# Patient Record
Sex: MALE | Race: WHITE | NOT HISPANIC OR LATINO | Employment: FULL TIME | URBAN - NONMETROPOLITAN AREA
[De-identification: names, ages, dates, MRNs, and addresses within clinical notes are randomized per-mention and may not be internally consistent; named-entity substitution may affect disease eponyms.]

---

## 2023-09-18 ENCOUNTER — APPOINTMENT (EMERGENCY)
Dept: CT IMAGING | Facility: HOSPITAL | Age: 23
DRG: 053 | End: 2023-09-18
Payer: MEDICARE

## 2023-09-18 ENCOUNTER — HOSPITAL ENCOUNTER (INPATIENT)
Facility: HOSPITAL | Age: 23
LOS: 1 days | Discharge: HOME | DRG: 053 | End: 2023-09-19
Attending: EMERGENCY MEDICINE | Admitting: HOSPITALIST
Payer: MEDICARE

## 2023-09-18 DIAGNOSIS — R56.9 SEIZURES (HCC): Primary | ICD-10-CM

## 2023-09-18 PROBLEM — E87.20 LACTIC ACIDOSIS: Status: ACTIVE | Noted: 2023-09-18

## 2023-09-18 PROBLEM — F19.90 DRUG USE: Status: ACTIVE | Noted: 2023-09-18

## 2023-09-18 PROBLEM — D72.829 LEUKOCYTOSIS: Status: ACTIVE | Noted: 2023-09-18

## 2023-09-18 PROBLEM — G40.309: Status: ACTIVE | Noted: 2023-09-18

## 2023-09-18 LAB
ALBUMIN SERPL BCP-MCNC: 5 G/DL (ref 3.5–5)
ALP SERPL-CCNC: 69 U/L (ref 34–104)
ALT SERPL W P-5'-P-CCNC: 14 U/L (ref 7–52)
ANION GAP SERPL CALCULATED.3IONS-SCNC: 26 MMOL/L
AST SERPL W P-5'-P-CCNC: 18 U/L (ref 13–39)
ATRIAL RATE: 99 BPM
BASOPHILS # BLD AUTO: 0.1 THOUSANDS/ÂΜL (ref 0–0.1)
BASOPHILS NFR BLD AUTO: 1 % (ref 0–1)
BILIRUB SERPL-MCNC: 0.41 MG/DL (ref 0.2–1)
BUN SERPL-MCNC: 12 MG/DL (ref 5–25)
CALCIUM SERPL-MCNC: 9.4 MG/DL (ref 8.4–10.2)
CHLORIDE SERPL-SCNC: 103 MMOL/L (ref 96–108)
CK SERPL-CCNC: 186 U/L (ref 39–308)
CO2 SERPL-SCNC: 12 MMOL/L (ref 21–32)
CREAT SERPL-MCNC: 0.93 MG/DL (ref 0.6–1.3)
EOSINOPHIL # BLD AUTO: 0.16 THOUSAND/ÂΜL (ref 0–0.61)
EOSINOPHIL NFR BLD AUTO: 1 % (ref 0–6)
ERYTHROCYTE [DISTWIDTH] IN BLOOD BY AUTOMATED COUNT: 12.9 % (ref 11.6–15.1)
ETHANOL SERPL-MCNC: <10 MG/DL
GFR SERPL CREATININE-BSD FRML MDRD: 116 ML/MIN/1.73SQ M
GLUCOSE SERPL-MCNC: 114 MG/DL (ref 65–140)
GLUCOSE SERPL-MCNC: 117 MG/DL (ref 65–140)
GLUCOSE SERPL-MCNC: 118 MG/DL (ref 65–140)
HCT VFR BLD AUTO: 51.8 % (ref 36.5–49.3)
HGB BLD-MCNC: 15.7 G/DL (ref 12–17)
IMM GRANULOCYTES # BLD AUTO: 0.17 THOUSAND/UL (ref 0–0.2)
IMM GRANULOCYTES NFR BLD AUTO: 1 % (ref 0–2)
LACTATE SERPL-SCNC: 20.9 MMOL/L (ref 0.5–2)
LACTATE SERPL-SCNC: 9.6 MMOL/L (ref 0.5–2)
LYMPHOCYTES # BLD AUTO: 3.85 THOUSANDS/ÂΜL (ref 0.6–4.47)
LYMPHOCYTES NFR BLD AUTO: 18 % (ref 14–44)
MCH RBC QN AUTO: 30.4 PG (ref 26.8–34.3)
MCHC RBC AUTO-ENTMCNC: 30.3 G/DL (ref 31.4–37.4)
MCV RBC AUTO: 100 FL (ref 82–98)
MONOCYTES # BLD AUTO: 2.87 THOUSAND/ÂΜL (ref 0.17–1.22)
MONOCYTES NFR BLD AUTO: 13 % (ref 4–12)
NEUTROPHILS # BLD AUTO: 14.29 THOUSANDS/ÂΜL (ref 1.85–7.62)
NEUTS SEG NFR BLD AUTO: 66 % (ref 43–75)
NRBC BLD AUTO-RTO: 0 /100 WBCS
P AXIS: 72 DEGREES
PLATELET # BLD AUTO: 294 THOUSANDS/UL (ref 149–390)
PMV BLD AUTO: 11 FL (ref 8.9–12.7)
POTASSIUM SERPL-SCNC: 3.4 MMOL/L (ref 3.5–5.3)
PR INTERVAL: 170 MS
PROT SERPL-MCNC: 7.4 G/DL (ref 6.4–8.4)
QRS AXIS: 82 DEGREES
QRSD INTERVAL: 92 MS
QT INTERVAL: 364 MS
QTC INTERVAL: 467 MS
RBC # BLD AUTO: 5.17 MILLION/UL (ref 3.88–5.62)
SODIUM SERPL-SCNC: 141 MMOL/L (ref 135–147)
T WAVE AXIS: 72 DEGREES
VENTRICULAR RATE: 99 BPM
WBC # BLD AUTO: 21.44 THOUSAND/UL (ref 4.31–10.16)

## 2023-09-18 PROCEDURE — 80053 COMPREHEN METABOLIC PANEL: CPT | Performed by: EMERGENCY MEDICINE

## 2023-09-18 PROCEDURE — 99284 EMERGENCY DEPT VISIT MOD MDM: CPT

## 2023-09-18 PROCEDURE — 99223 1ST HOSP IP/OBS HIGH 75: CPT | Performed by: HOSPITALIST

## 2023-09-18 PROCEDURE — 96374 THER/PROPH/DIAG INJ IV PUSH: CPT

## 2023-09-18 PROCEDURE — NC001 PR NO CHARGE: Performed by: NURSE PRACTITIONER

## 2023-09-18 PROCEDURE — 83605 ASSAY OF LACTIC ACID: CPT | Performed by: EMERGENCY MEDICINE

## 2023-09-18 PROCEDURE — 96361 HYDRATE IV INFUSION ADD-ON: CPT

## 2023-09-18 PROCEDURE — 93005 ELECTROCARDIOGRAM TRACING: CPT

## 2023-09-18 PROCEDURE — 82948 REAGENT STRIP/BLOOD GLUCOSE: CPT

## 2023-09-18 PROCEDURE — 70450 CT HEAD/BRAIN W/O DYE: CPT

## 2023-09-18 PROCEDURE — 36415 COLL VENOUS BLD VENIPUNCTURE: CPT | Performed by: EMERGENCY MEDICINE

## 2023-09-18 PROCEDURE — 82077 ASSAY SPEC XCP UR&BREATH IA: CPT | Performed by: EMERGENCY MEDICINE

## 2023-09-18 PROCEDURE — 85025 COMPLETE CBC W/AUTO DIFF WBC: CPT | Performed by: EMERGENCY MEDICINE

## 2023-09-18 PROCEDURE — 80177 DRUG SCRN QUAN LEVETIRACETAM: CPT | Performed by: HOSPITALIST

## 2023-09-18 PROCEDURE — 99291 CRITICAL CARE FIRST HOUR: CPT | Performed by: EMERGENCY MEDICINE

## 2023-09-18 PROCEDURE — G1004 CDSM NDSC: HCPCS

## 2023-09-18 PROCEDURE — 82550 ASSAY OF CK (CPK): CPT | Performed by: EMERGENCY MEDICINE

## 2023-09-18 RX ORDER — SODIUM CHLORIDE 9 MG/ML
75 INJECTION, SOLUTION INTRAVENOUS CONTINUOUS
Status: DISCONTINUED | OUTPATIENT
Start: 2023-09-18 | End: 2023-09-18

## 2023-09-18 RX ORDER — LORAZEPAM 2 MG/ML
1 INJECTION INTRAMUSCULAR ONCE
Status: COMPLETED | OUTPATIENT
Start: 2023-09-18 | End: 2023-09-18

## 2023-09-18 RX ORDER — ZONISAMIDE 100 MG/1
100 CAPSULE ORAL DAILY
Status: DISCONTINUED | OUTPATIENT
Start: 2023-09-18 | End: 2023-09-19

## 2023-09-18 RX ORDER — POTASSIUM CHLORIDE 14.9 MG/ML
20 INJECTION INTRAVENOUS ONCE
Status: COMPLETED | OUTPATIENT
Start: 2023-09-18 | End: 2023-09-18

## 2023-09-18 RX ORDER — SODIUM CHLORIDE, SODIUM GLUCONATE, SODIUM ACETATE, POTASSIUM CHLORIDE, MAGNESIUM CHLORIDE, SODIUM PHOSPHATE, DIBASIC, AND POTASSIUM PHOSPHATE .53; .5; .37; .037; .03; .012; .00082 G/100ML; G/100ML; G/100ML; G/100ML; G/100ML; G/100ML; G/100ML
1000 INJECTION, SOLUTION INTRAVENOUS ONCE
Status: COMPLETED | OUTPATIENT
Start: 2023-09-18 | End: 2023-09-18

## 2023-09-18 RX ORDER — NICOTINE 21 MG/24HR
1 PATCH, TRANSDERMAL 24 HOURS TRANSDERMAL DAILY
Status: DISCONTINUED | OUTPATIENT
Start: 2023-09-19 | End: 2023-09-19 | Stop reason: HOSPADM

## 2023-09-18 RX ORDER — ZONISAMIDE 100 MG/1
100 CAPSULE ORAL DAILY
Status: DISCONTINUED | OUTPATIENT
Start: 2023-09-19 | End: 2023-09-18

## 2023-09-18 RX ORDER — ZONISAMIDE 100 MG/1
100 CAPSULE ORAL DAILY
COMMUNITY
End: 2023-09-19

## 2023-09-18 RX ORDER — SODIUM CHLORIDE, SODIUM GLUCONATE, SODIUM ACETATE, POTASSIUM CHLORIDE, MAGNESIUM CHLORIDE, SODIUM PHOSPHATE, DIBASIC, AND POTASSIUM PHOSPHATE .53; .5; .37; .037; .03; .012; .00082 G/100ML; G/100ML; G/100ML; G/100ML; G/100ML; G/100ML; G/100ML
100 INJECTION, SOLUTION INTRAVENOUS CONTINUOUS
Status: DISCONTINUED | OUTPATIENT
Start: 2023-09-18 | End: 2023-09-19

## 2023-09-18 RX ORDER — ACETAMINOPHEN 325 MG/1
975 TABLET ORAL ONCE
Status: COMPLETED | OUTPATIENT
Start: 2023-09-18 | End: 2023-09-18

## 2023-09-18 RX ORDER — LEVETIRACETAM 500 MG/1
500 TABLET ORAL 2 TIMES DAILY
COMMUNITY
Start: 2022-09-23 | End: 2023-09-19

## 2023-09-18 RX ORDER — ACETAMINOPHEN 325 MG/1
650 TABLET ORAL EVERY 4 HOURS PRN
Status: DISCONTINUED | OUTPATIENT
Start: 2023-09-18 | End: 2023-09-19 | Stop reason: HOSPADM

## 2023-09-18 RX ORDER — LORAZEPAM 2 MG/ML
2 INJECTION INTRAMUSCULAR EVERY 6 HOURS PRN
Status: DISCONTINUED | OUTPATIENT
Start: 2023-09-18 | End: 2023-09-19 | Stop reason: HOSPADM

## 2023-09-18 RX ORDER — LORAZEPAM 2 MG/ML
INJECTION INTRAMUSCULAR
Status: COMPLETED
Start: 2023-09-18 | End: 2023-09-18

## 2023-09-18 RX ORDER — LORAZEPAM 2 MG/ML
2 INJECTION INTRAMUSCULAR ONCE
Status: DISCONTINUED | OUTPATIENT
Start: 2023-09-18 | End: 2023-09-18

## 2023-09-18 RX ADMIN — SODIUM CHLORIDE, SODIUM GLUCONATE, SODIUM ACETATE, POTASSIUM CHLORIDE AND MAGNESIUM CHLORIDE 100 ML/HR: 526; 502; 368; 37; 30 INJECTION, SOLUTION INTRAVENOUS at 18:18

## 2023-09-18 RX ADMIN — LORAZEPAM 1 MG: 2 INJECTION INTRAMUSCULAR at 12:37

## 2023-09-18 RX ADMIN — LEVETIRACETAM 1000 MG: 100 INJECTION, SOLUTION INTRAVENOUS at 22:21

## 2023-09-18 RX ADMIN — SODIUM CHLORIDE 1000 ML: 0.9 INJECTION, SOLUTION INTRAVENOUS at 14:53

## 2023-09-18 RX ADMIN — LORAZEPAM 1 MG: 2 INJECTION INTRAMUSCULAR; INTRAVENOUS at 12:37

## 2023-09-18 RX ADMIN — ZONISAMIDE 100 MG: 100 CAPSULE ORAL at 19:04

## 2023-09-18 RX ADMIN — SODIUM CHLORIDE, SODIUM GLUCONATE, SODIUM ACETATE, POTASSIUM CHLORIDE AND MAGNESIUM CHLORIDE 1000 ML: 526; 502; 368; 37; 30 INJECTION, SOLUTION INTRAVENOUS at 18:10

## 2023-09-18 RX ADMIN — POTASSIUM CHLORIDE 20 MEQ: 14.9 INJECTION, SOLUTION INTRAVENOUS at 18:09

## 2023-09-18 RX ADMIN — SODIUM CHLORIDE 1000 ML: 0.9 INJECTION, SOLUTION INTRAVENOUS at 12:55

## 2023-09-18 RX ADMIN — ACETAMINOPHEN 975 MG: 325 TABLET, FILM COATED ORAL at 14:49

## 2023-09-18 RX ADMIN — SODIUM CHLORIDE 75 ML/HR: 0.9 INJECTION, SOLUTION INTRAVENOUS at 16:57

## 2023-09-18 NOTE — CODE DOCUMENTATION
Patient brought to room from ED, patient having seizure like activity, rapid response called, suctioned, vitals, was placed on non-rebreather, ativan was given. Patient transferred to ICU.

## 2023-09-18 NOTE — ASSESSMENT & PLAN NOTE
Mother reports he has used meth in the past. Multiple urine drug screens positive for cannabis and amphetamines at [facility]. Check urine drug screen.  Mother reports he is on parol so she believes he has not used drugs recently  - check UDS

## 2023-09-18 NOTE — PROGRESS NOTES
Pastoral Care Progress Note    2023  Patient: Sarika Alexandra. : 2000  Admission Date & Time: 2023 1236  MRN: [id number] [facility]: [id number]      Ch responded at 1520 to . Pt occupied with providers. Mother, Courtney Carvalho, bedside. [address] [place] [address] provided support to Niyah in the family waiting room outside of ICU. Niyah shared regarding pt's emotional/social history:    Pt's younger sister killed herself a year ago, he has 2 other sisters. Pt has been living in [facility], and was recently incarcerated. Mother encouraged pt to come to live with her in the area so that he might have a fresh start. Mother of pt attests that pt is non compliant with his medication. Mother of pt characterized her household as low income. Niyah shared that Leesa Cox recently started to work where she works, this is his first full time job. Niyah shared her hope that pt might be able to acclimate to working, enjoy his job, but has concerns that his seizures may obstruct his ability to work. Niyah characterizes the pt as "loving but a teaser" to his sisters. She shared that the pt and spent some time apart in more recent years because they "butted heads". Niyah did not speak about additional support available to the pt, although she mentioned that the pt's biological father is part of his life. Niyah is new to the area and has a daughter that recently moved here. Niyah shared that her currently anxious and sad because of today's medical event, she is hoping it doesn't "set Leesa Cox back" as far as gainful employment and building a life. CH will follow. Chaplaincy Interventions Utilized:   Empowerment: Encouraged self-care and Provided grief counseling    Exploration: Explored relational needs & resources    Relationship Building: Listened empathically      Chaplaincy Outcomes Achieved:   Identified priorities      Spiritual Coping Strategies Utilized:   Connectedness     23 1600   Clinical Encounter Type   Visited With Family  (mother)   Crisis Visit Code  (rrt)

## 2023-09-18 NOTE — RAPID RESPONSE
Rapid Response Note  Nickie Argueta. 26 y.o. male MRN: [id number]  Unit/Bed#: -01 Encounter: [id number]    Rapid Response Notification(s):   Response called date/time:  9/18/2023 3:20 PM  Response team arrival date/time:  9/18/2023 3:21 PM  Response end date/time:  9/18/2023 3:30 PM  Level of care:  Medsur  [facility]  Primary reason for rapid response call:  New onset of seizures    Rapid Response Intervention(s):   Airway:  Suction  Breathing:  Oxygen  Fluids administered:  None  Medications administered:  None       Assessment:   · Seizure with Hx of such and hasn't been on a home medication for 2 weeks    Plan:   · Seizure broke without medications during rapid. Hold on additional ativan at this time  · Per mom: pt on Keppra 500 BID and zonegran at home, he hasn't had his zonegran in 2 weeks due to pharmacy doesn't have it. · Pt now appears post ictal  · Will given Keppra 1 gram now  · Up grade to SD2  · Continue seizure precautions and routine neuro checks  · Consider neurology consult  · Resume home medications when able to take PO     Rapid Response Outcome:   Transfer:  Transfer to stepdown 2  Primary service notified of transfer: Yes    Code Status: Level 1 (Full Code)      Family notified of transfer: yes  Family member contacted: mother. At bedside     Background/Situation:   Nickie Angeles is a 26 y.o. male who was a rapid response when arriving to [address] [facility] from ED due to active seizure in progress. Pt with Hx of Seizures and hasn't been on one of his home medications. Just admitted in the ED for seizures.      Review of Systems   Unable to perform ROS: Mental status change       Objective:   Vitals:    09/18/23 1238 09/18/23 1245 09/18/23 1450 09/18/23 1500   BP: 160/86 142/65 123/74 112/67   BP Location: Right arm Right arm Right arm Right arm   Pulse: 103 92 84 73   Resp: 22 22 16 18   Temp: 98.4 °F (36.9 °C)      TempSrc: Temporal      SpO2: 98% 97% 98% 96% Weight: 91.8 kg (202 lb 6.1 oz)        Physical Exam  Vitals and nursing note reviewed. Constitutional:       Appearance: He is ill-appearing. HENT:      Head: Normocephalic and atraumatic. Mouth/Throat:      Mouth: Mucous membranes are moist.   Eyes:      Conjunctiva/sclera: Conjunctivae normal.   Cardiovascular:      Rate and Rhythm: Normal rate and regular rhythm. Heart sounds: Normal heart sounds. Pulmonary:      Effort: Pulmonary effort is normal.   Abdominal:      General: Bowel sounds are normal.      Palpations: Abdomen is soft. Musculoskeletal:         General: Normal range of motion. Cervical back: Neck supple. Skin:     General: Skin is warm and dry. Neurological:      Comments: Initially with tonic clonic seizure. Pt broke without medication. Post seizure: withdrawing in all extremities to physical stimuli, not responding to simple questions or following commands. Groaning. Portions of the record may have been created with voice recognition software. Occasional wrong word or "sound a like" substitutions may have occurred due to the inherent limitations of voice recognition software. Read the chart carefully and recognize, using context, where substitutions have occurred.     SOLITARIO Toth

## 2023-09-18 NOTE — CODE DOCUMENTATION
Rapid response  asked for Ativan but then patient became post-ictal and request was cancelled. Patient transferred to ICU.

## 2023-09-18 NOTE — ED PROVIDER NOTES
History  Chief Complaint   Patient presents with   • Seizure - Prior Hx Of     3 seizures today. Seizing on arrival        29-year-old male accompanied by mother describes 2 seizures prior to arrival and again in emergency department. Mother notes seizures last couple minutes and then postictal approximately 15-20 minutes. Recently unable to fill prescription for his Zonegran. Mother notes recently relocated to [facility], was released from long-term. No history of injury or viral prodrome. Last prior seizure may have been few weeks ago. Mother provides partially filled bottle of keppra 500 mg tabs, notes he is compliant      History provided by:  Parent  History limited by:  Acuity of condition  Seizure - Prior Hx Of  Seizure activity on arrival: yes    Seizure type:  Grand mal  Episode characteristics: abnormal movements, combativeness, generalized shaking and stiffening    Postictal symptoms: confusion and somnolence    Return to baseline: yes    Timing:  Clustered  Progression:  Worsening  Context: medical non-compliance and stress    Context: not previous head injury    PTA treatment:  None  History of seizures: yes        Prior to Admission Medications   Prescriptions Last Dose Informant Patient Reported? Taking?   levETIRAcetam (KEPPRA) 500 mg tablet 9/18/2023  Yes Yes   Sig: Take 500 mg by mouth 2 (two) times a day   zonisamide (ZONEGRAN) 100 mg capsule Unknown Mother Yes No   Sig: Take 100 mg by mouth daily Per family pt hasnt had since [facility] of [facility] approx 2-3 weeks      Facility-Administered Medications: None       Past Medical History:   Diagnosis Date   • Seizure ([address])        History reviewed. No pertinent surgical history. History reviewed. No pertinent family history. I have reviewed and agree with the history as documented.     E-Cigarette/Vaping     E-Cigarette/Vaping Substances     Social History     Tobacco Use   • Smoking status: Every Day     Packs/day: 1.00     Years: 5.00     Total pack years: 5.00     Types: Cigarettes   • Smokeless tobacco: Never   Substance Use Topics   • Alcohol use: Yes     Comment: sociall   • Drug use: Not Currently     Comment: per mother pt has history of meth amphetamine abuse       Review of Systems   All other systems reviewed and are negative. Physical Exam  Physical Exam  Vitals and nursing note reviewed. Constitutional:       General: He is in acute distress. Comments: Appears postictal, combative, agitated, requiring manual restraint   HENT:      Head: Normocephalic and atraumatic. Mouth/Throat:      Mouth: Mucous membranes are moist.      Pharynx: Oropharynx is clear. Comments: Drooling  Eyes:      Conjunctiva/sclera: Conjunctivae normal.      Pupils: Pupils are equal, round, and reactive to light. Cardiovascular:      Rate and Rhythm: Normal rate and regular rhythm. Pulses: Normal pulses. Heart sounds: Normal heart sounds. Pulmonary:      Effort: Pulmonary effort is normal.      Breath sounds: Normal breath sounds. Abdominal:      General: Bowel sounds are normal. There is no distension. Palpations: Abdomen is soft. Tenderness: There is no abdominal tenderness. Musculoskeletal:         General: No deformity. Cervical back: Neck supple. No rigidity. Skin:     General: Skin is warm and dry. Neurological:      General: No focal deficit present. Mental Status: He is alert. Sensory: No sensory deficit. Motor: No weakness. Comments: Moving all extremities, mumbled speech, agitated, calms without noxious stimulation, sonorous breathing   Psychiatric:         Behavior: Behavior normal.         Thought Content:  Thought content normal.         Judgment: Judgment normal.         Vital Signs  ED Triage Vitals   Temperature Pulse Respirations Blood Pressure SpO2   09/18/23 1238 09/18/23 1238 09/18/23 1238 09/18/23 1238 09/18/23 1238   98.4 °F (36.9 °C) 103 22 160/86 98 %      Temp Source Heart Rate Source Patient Position - Orthostatic VS BP Location FiO2 (%)   09/18/23 1238 09/18/23 1238 09/18/23 1245 09/18/23 1238 --   Temporal Monitor Lying Right arm       Pain Score       09/18/23 1449       4           Vitals:    09/19/23 0230 09/19/23 0400 09/19/23 0729 09/19/23 1220   BP: 120/62  116/65 122/73   Pulse: 73 70 68 (!) 53   Patient Position - Orthostatic VS: Lying  Lying Lying         Visual Acuity  Visual Acuity    Flowsheet Row Most Recent Value   L Pupil Size (mm) 3   R Pupil Size (mm) 3   L Pupil Shape Round   R Pupil Shape Round          ED Medications  Medications   nicotine (NICODERM CQ) 14 mg/24hr TD 24 hr patch 1 patch (1 patch Transdermal Not Given 9/19/23 0843)   LORazepam (ATIVAN) injection 2 mg (has no administration in time range)   acetaminophen (TYLENOL) tablet 650 mg (has no administration in time range)   nicotine polacrilex (NICORETTE) gum 2 mg (2 mg Oral Given 9/19/23 0843)   levETIRAcetam (KEPPRA) tablet 500 mg (has no administration in time range)   zonisamide (ZONEGRAN) capsule 300 mg (has no administration in time range)   sodium chloride 0.9 % bolus 1,000 mL (0 mL Intravenous Stopped 9/18/23 1447)   LORazepam (ATIVAN) injection 1 mg (1 mg Intravenous Given 9/18/23 1237)   sodium chloride 0.9 % bolus 1,000 mL (0 mL Intravenous Stopped 9/18/23 1553)   acetaminophen (TYLENOL) tablet 975 mg (975 mg Oral Given 9/18/23 1449)   multi-electrolyte (ISOLYTE-S PH 7.4) bolus 1,000 mL (0 mL Intravenous Stopped 9/18/23 1910)   potassium chloride 20 mEq IVPB (premix) (0 mEq Intravenous Stopped 9/18/23 2009)   zonisamide (ZONEGRAN) capsule 200 mg (200 mg Oral Given 9/19/23 1217)       Diagnostic Studies  Results Reviewed     Procedure Component Value Units Date/Time    Rapid drug screen, urine [[id number]]  (Abnormal) Collected: 09/19/23 0513    Lab Status: Final result Specimen: Urine, Clean Catch Updated: 09/19/23 0552     Amph/Meth UR Negative     Barbiturate Ur Negative     Benzodiazepine Urine Negative     Cocaine Urine Negative     Methadone Urine Negative     Opiate Urine Negative     PCP Ur Negative     THC Urine Positive     Oxycodone Urine Negative    Narrative:      Presumptive report. If requested, specimen will be sent to reference lab for confirmation. FOR MEDICAL PURPOSES ONLY. IF CONFIRMATION NEEDED PLEASE CONTACT THE LAB WITHIN 5 DAYS. Drug Screen Cutoff Levels:  AMPHETAMINE/METHAMPHETAMINES  1000 ng/mL  BARBITURATES     200 ng/mL  BENZODIAZEPINES     200 ng/mL  COCAINE      300 ng/mL  METHADONE      300 ng/mL  OPIATES      300 ng/mL  PHENCYCLIDINE     25 ng/mL  THC       50 ng/mL  OXYCODONE      100 ng/mL    UA (URINE) with reflex to Scope [[id number]] Collected: 09/19/23 0513    Lab Status: Final result Specimen: Urine, Clean Catch Updated: 09/19/23 0530     Color, UA Yellow     Clarity, UA Clear     Specific Gravity, UA 1.010     pH, UA 7.0     Leukocytes, UA Negative     Nitrite, UA Negative     Protein, UA Negative mg/dl      Glucose, UA Negative mg/dl      Ketones, UA Negative mg/dl      Urobilinogen, UA 0.2 E.U./dl      Bilirubin, UA Negative     Occult Blood, UA Negative    Lactic acid 2 Hours [[id number]]  (Abnormal) Collected: 09/18/23 1552    Lab Status: Final result Specimen: Blood from Arm, Right Updated: 09/18/23 1630     LACTIC ACID 9.6 mmol/L     Narrative:      Result may be elevated if tourniquet was used during collection. Lactic acid, plasma (w/reflex if result > 2.0) [[id number]]  (Abnormal) Collected: 09/18/23 1254    Lab Status: Final result Specimen: Blood from Arm, Left Updated: 09/18/23 1343     LACTIC ACID 20.9 mmol/L     Narrative:      Result may be elevated if tourniquet was used during collection.     Comprehensive metabolic panel [[id number]]  (Abnormal) Collected: 09/18/23 1254    Lab Status: Final result Specimen: Blood from Arm, Left Updated: 09/18/23 1323     Sodium 141 mmol/L      Potassium 3.4 mmol/L      Chloride 103 mmol/L      CO2 12 mmol/L ANION GAP 26 mmol/L      BUN 12 mg/dL      Creatinine 0.93 mg/dL      Glucose 118 mg/dL      Calcium 9.4 mg/dL      AST 18 U/L      ALT 14 U/L      Alkaline Phosphatase 69 U/L      Total Protein 7.4 g/dL      Albumin 5.0 g/dL      Total Bilirubin 0.41 mg/dL      eGFR 116 ml/min/1.73sq m     Narrative:      [facility] guidelines for Chronic Kidney Disease (CKD):   •  Stage 1 with normal or high GFR (GFR > 90 mL/min/1.73 square meters)  •  Stage 2 Mild CKD (GFR = 60-89 mL/min/1.73 square meters)  •  Stage 3A Moderate CKD (GFR = 45-59 mL/min/1.73 square meters)  •  Stage 3B Moderate CKD (GFR = 30-44 mL/min/1.73 square meters)  •  Stage 4 Severe CKD (GFR = 15-29 mL/min/1.73 square meters)  •  Stage 5 End Stage CKD (GFR <15 mL/min/1.73 square meters)  Note: GFR calculation is accurate only with a steady state creatinine    CK [[id number]]  (Normal) Collected: 09/18/23 1254    Lab Status: Final result Specimen: Blood from Arm, Left Updated: 09/18/23 1323     Total  U/L     Ethanol [[id number]]  (Normal) Collected: 09/18/23 1254    Lab Status: Final result Specimen: Blood from Arm, Left Updated: 09/18/23 1323     Ethanol Lvl <10 mg/dL     CBC and differential [[id number]]  (Abnormal) Collected: 09/18/23 1254    Lab Status: Final result Specimen: Blood from Arm, Left Updated: 09/18/23 1309     WBC 21.44 Thousand/uL      RBC 5.17 Million/uL      Hemoglobin 15.7 g/dL      Hematocrit 51.8 %       fL      MCH 30.4 pg      MCHC 30.3 g/dL      RDW 12.9 %      MPV 11.0 fL      Platelets 908 Thousands/uL      nRBC 0 /100 WBCs      Neutrophils Relative 66 %      Immat GRANS % 1 %      Lymphocytes Relative 18 %      Monocytes Relative 13 %      Eosinophils Relative 1 %      Basophils Relative 1 %      Neutrophils Absolute 14.29 Thousands/µL      Immature Grans Absolute 0.17 Thousand/uL      Lymphocytes Absolute 3.85 Thousands/µL      Monocytes Absolute 2.87 Thousand/µL      Eosinophils Absolute 0.16 Thousand/µL      Basophils Absolute 0.10 Thousands/µL     Fingerstick Glucose (POCT) [[id number]]  (Normal) Collected: 09/18/23 1238    Lab Status: Final result Updated: 09/18/23 1244     POC Glucose 117 mg/dl                  CT head without contrast    (Results Pending)              Procedures  CriticalCare Time    Date/Time: 9/19/2023 1:58 PM    Performed by: Julio Pablo DO  Authorized by: Julio Pablo DO    Critical care provider statement:     Critical care time (minutes):  30    Critical care was necessary to treat or prevent imminent or life-threatening deterioration of the following conditions:  CNS failure or compromise    Critical care was time spent personally by me on the following activities:  Blood draw for specimens, obtaining history from patient or surrogate, development of treatment plan with patient or surrogate, discussions with consultants, evaluation of patient's response to treatment, examination of patient, ordering and performing treatments and interventions, ordering and review of laboratory studies, ordering and review of radiographic studies and re-evaluation of patient's condition             ED Course  ED Course as of 09/19/23 1359   Mon Sep 18, 2023   1321 EKG 12:39 PM normal sinus rhythm rate 99 normal axis normal intervals T wave inversion in aVL no ST elevation or depression interpreted by me   1345 MEDICAL ALCOHOL: <10   1346 WBC(!): 21.44   1346 LACTIC ACID(!!): 20.9   1346 Potassium(!): 3.4   1346 Notes moderate general headache. Lucid and appropriate. Not compliant with Keppra 500 mg twice daily, but not taking Zonegran 100 mg 3 times daily for the past week and a half   1357 Neurology Elan TT   1404 Neuro Elan agreeable with GSL observation   1409 CT head without contrast  Reviewed                                             Medical Decision Making  Seizures [facility]): acute illness or injury  Amount and/or Complexity of Data Reviewed  Labs: ordered. Decision-making details documented in ED Course. Radiology: ordered. Decision-making details documented in ED Course. ECG/medicine tests: ordered and independent interpretation performed. Decision-making details documented in ED Course. Risk  OTC drugs. Prescription drug management. Decision regarding hospitalization. Disposition  Final diagnoses:   Seizures ([address])     Time reflects when diagnosis was documented in both MDM as applicable and the Disposition within this note     Time User Action Codes Description Comment    9/18/2023  2:06 PM Rosemary Flores Add [R56.9] Seizures [facility])       ED Disposition     ED Disposition   Admit    Condition   Stable    Date/Time   Mon Sep 18, 2023  2:45 PM    Comment   Case was discussed with Dr. Kay and the patient's admission status was agreed to be Admission Status: inpatient status to the service of Dr. Kay           Follow-up Information    None         Current Discharge Medication List      CONTINUE these medications which have NOT CHANGED    Details   levETIRAcetam (KEPPRA) 500 mg tablet Take 500 mg by mouth 2 (two) times a day      zonisamide (ZONEGRAN) 100 mg capsule Take 100 mg by mouth daily Per family pt hasnt had since out of long term approx 2-3 weeks             No discharge procedures on file.     PDMP Review     None          ED Provider  Electronically Signed by           Rosemary Flores DO  09/19/23 9447

## 2023-09-18 NOTE — H&P
[address] [facility]  H&P  Name: Adair Lima. 26 y.o. male I MRN: [id number]  Unit/Bed#: -01 I Date of Admission: 9/18/2023   Date of Service: 9/18/2023 I Hospital Day: 0      Assessment/Plan   Epileptic seizure, generalized [facility])  Assessment & Plan  Patient has history epileptic seizures, prior reports non-compliance with medications. He has been unable to obtain his zonegran due to not being available at his new pharmacy past 2 weeks per his mother. Review of prior labs at [facility], [place] show multiple keppra levels <2.0 and one zonegran level below normal limits supporting non-compliance. He presents with 2x seizures prior to ED, 1x in ED, and another on arrival to the floor. - CT Head pending resuls  - Keppra 1,000 mg IV BID  - restart Zonegran 100mg daily in morning - unclear what his dose was but was initially started with a 100mg dose to be titrated up to 300mg daily  - suspect non-compliance versus drug use as etiology of seizures  - see below for drug use  - check Keppra level  - Neuro consult  - seizure precautions  - NPO as currently post-ictal  - Ativan 2mg IV PRN q6h seizure    Drug use  Assessment & Plan  Mother reports he has used meth in the past. Multiple urine drug screens positive for cannabis and amphetamines at [facility], [place]. Check urine drug screen. Mother reports he is on parol so she believes he has not used drugs recently  - check UDS    Leukocytosis  Assessment & Plan  Suspect due to seizure  - CBC in morning  - UA pending    Lactic acidosis  Assessment & Plan  Likely due to seizure  - trend in morning  - IVF           VTE Pharmacologic Prophylaxis: VTE Score: 1 Low Risk (Score 0-2) - Encourage Ambulation. Code Status: Level 1 - Full Code   Discussion with family: Updated  (mother) at bedside.     Anticipated Length of Stay: Patient will be admitted on an inpatient basis with an anticipated length of stay of greater than 2 midnights secondary to seizures. Total Time Spent on Date of Encounter in care of patient: 52 mins. This time was spent on one or more of the following: performing physical exam; counseling and coordination of care; obtaining or reviewing history; documenting in the medical record; reviewing/ordering tests, medications or procedures; communicating with other healthcare professionals and discussing with patient's family/caregivers. Chief Complaint: seizures    History of Present Illness:  James Langford is a 26 y.o. male with a PMH of epileptic seizures, noncompliance, cannabis and amphetamine use who presents with seizures. Patient is brought to the hospital after having 2 seizures at home, he had a seizure witnessed in the emergency department which resolved with 1 mg Ativan. He was found to have significantly elevated lactic acidosis as well as leukocytosis. Recommended for admission for further monitoring of seizures. He has a history of noncompliance and usually follows at [facility] where he has had low Keppra levels, and positive drug screens in the past.  Patient had an additional seizure upon being brought up to the floor, rapid response was called and patient had resolution of seizure without need for Ativan, placed in stepdown for closer monitoring. Patient at this time is postictal and cannot provide much information. His mother details that the patient just recently moved in with her, after getting out of [facility] a few weeks ago. They were unable to obtain his Zonegran as the pharmacy did not have it in stock and he has not taken it for the past 2 weeks. She otherwise reports that she believes he takes his Keppra as prescribed. To the best of her knowledge he has not recently used recreational drugs, but she knows the past he has used marijuana and methamphetamines.      Review of Systems:  Review of Systems   Unable to perform ROS: Mental status change   POST-ICTAL    Past Medical and Surgical History: Past Medical History:   Diagnosis Date   • Seizure [facility])        History reviewed. No pertinent surgical history. Meds/Allergies:  Prior to Admission medications    Medication Sig Start Date End Date Taking? Authorizing Provider   levETIRAcetam (KEPPRA) 500 mg tablet Take 500 mg by mouth 2 (two) times a day 9/23/22 9/23/23 Yes Historical Provider, MD GIPSON have reviewed home medications with patient family member. Allergies: No Known Allergies    Social History:  Marital Status: Single   Occupation: N/A  Patient Pre-hospital Living Situation: Home  Patient Pre-hospital Level of Mobility: walks  Patient Pre-hospital Diet Restrictions: none  Substance Use History:   Social History     Substance and Sexual Activity   Alcohol Use Yes    Comment: sociall     Social History     Tobacco Use   Smoking Status Every Day   • Types: Cigarettes   Smokeless Tobacco Never     Social History     Substance and Sexual Activity   Drug Use Not Currently       Family History:  History reviewed. No pertinent family history. Physical Exam:     Vitals:   Blood Pressure: 124/71 (09/18/23 1600)  Pulse: 82 (09/18/23 1600)  Temperature: 98 °F (36.7 °C) (09/18/23 1548)  Temp Source: Temporal (09/18/23 1548)  Respirations: 12 (09/18/23 1600)  Height: 6' (182.9 cm) (09/18/23 1600)  Weight - Scale: 88.3 kg (194 lb 10.7 oz) (09/18/23 1548)  SpO2: 96 % (09/18/23 1600)    Physical Exam  Vitals and nursing note reviewed. Constitutional:       Appearance: He is well-developed. He is toxic-appearing. HENT:      Head: Normocephalic and atraumatic. Eyes:      Conjunctiva/sclera: Conjunctivae normal.   Cardiovascular:      Rate and Rhythm: Normal rate and regular rhythm. Heart sounds: No murmur heard. Pulmonary:      Effort: Pulmonary effort is normal. No respiratory distress. Breath sounds: Normal breath sounds. Abdominal:      Palpations: Abdomen is soft. Tenderness: There is no abdominal tenderness.    Musculoskeletal: General: No swelling. Cervical back: Neck supple. Skin:     General: Skin is warm and dry. Capillary Refill: Capillary refill takes less than 2 seconds. Neurological:      Comments: Patient is postictal, he wakes to verbal cue but then goes back to sleep, he is moving all his extremities spontaneously at this time. Additional Data:     Lab Results:  Results from last 7 days   Lab Units 09/18/23  1254   WBC Thousand/uL 21.44*   HEMOGLOBIN g/dL 15.7   HEMATOCRIT % 51.8*   PLATELETS Thousands/uL 294   NEUTROS PCT % 66   LYMPHS PCT % 18   MONOS PCT % 13*   EOS PCT % 1     Results from last 7 days   Lab Units 09/18/23  1254   SODIUM mmol/L 141   POTASSIUM mmol/L 3.4*   CHLORIDE mmol/L 103   CO2 mmol/L 12*   BUN mg/dL 12   CREATININE mg/dL 0.93   ANION GAP mmol/L 26   CALCIUM mg/dL 9.4   ALBUMIN g/dL 5.0   TOTAL BILIRUBIN mg/dL 0.41   ALK PHOS U/L 69   ALT U/L 14   AST U/L 18   GLUCOSE RANDOM mg/dL 118         Results from last 7 days   Lab Units 09/18/23  1525 09/18/23  1238   POC GLUCOSE mg/dl 114 117         Results from last 7 days   Lab Units 09/18/23  1552 09/18/23  1254   LACTIC ACID mmol/L 9.6* 20.9*       Lines/Drains:  Invasive Devices     Peripheral Intravenous Line  Duration           Peripheral IV 09/18/23 Distal;Left;Upper;Ventral (anterior) Arm <1 day    Peripheral IV 09/18/23 Right;Ventral (anterior) Forearm <1 day                    Imaging: Personally reviewed the following imaging: CT head - no obvious or significant finding awaiting radiology read  CT head without contrast    (Results Pending)       ** Please Note: This note has been constructed using a voice recognition system.  **

## 2023-09-18 NOTE — ASSESSMENT & PLAN NOTE
Patient has history epileptic seizures, prior reports non-compliance with medications. He has been unable to obtain his zonegran due to not being available at his new pharmacy past 2 weeks per his mother. Review of prior labs at [facility] show multiple keppra levels <2.0 and one zonegran level below normal limits supporting non-compliance. He presents with 2x seizures prior to ED, 1x in ED, and another on arrival to the floor.   - CT Head pending resuls  - Keppra 1,000 mg IV BID  - restart Zonegran 100mg daily in morning - unclear what his dose was but was initially started with a 100mg dose to be titrated up to 300mg daily  - suspect non-compliance versus drug use as etiology of seizures  - see below for drug use  - check Keppra level  - Neuro consult  - seizure precautions  - NPO as currently post-ictal  - Ativan 2mg IV PRN q6h seizure

## 2023-09-18 NOTE — PLAN OF CARE
Problem: Potential for Falls  Goal: Patient will remain free of falls  Description: INTERVENTIONS:  - Educate patient/family on patient safety including physical limitations  - Instruct patient to call for assistance with activity   - Consult OT/PT to assist with strengthening/mobility   - Keep Call bell within reach  - Keep bed low and locked with side rails adjusted as appropriate  - Keep care items and personal belongings within reach  - Initiate and maintain comfort rounds  - Make Fall Risk Sign visible to staff  - Offer Toileting every 2 Hours, in advance of need  - Initiate/Maintain bed alarm  - Obtain necessary fall risk management equipment: non skid socks  - Apply yellow socks and bracelet for high fall risk patients  - Consider moving patient to room near nurses station  Outcome: Progressing     Problem: Prexisting or High Potential for Compromised Skin Integrity  Goal: Skin integrity is maintained or improved  Description: INTERVENTIONS:  - Identify patients at risk for skin breakdown  - Assess and monitor skin integrity  - Assess and monitor nutrition and hydration status  - Monitor labs   - Assess for incontinence   - Turn and reposition patient  - Assist with mobility/ambulation  - Relieve pressure over bony prominences  - Avoid friction and shearing  - Provide appropriate hygiene as needed including keeping skin clean and dry  - Evaluate need for skin moisturizer/barrier cream  - Collaborate with interdisciplinary team   - Patient/family teaching  - Consider wound care consult   Outcome: Progressing     Problem: MOBILITY - ADULT  Goal: Maintain or return to baseline ADL function  Description: INTERVENTIONS:  -  Assess patient's ability to carry out ADLs; assess patient's baseline for ADL function and identify physical deficits which impact ability to perform ADLs (bathing, care of mouth/teeth, toileting, grooming, dressing, etc.)  - Assess/evaluate cause of self-care deficits   - Assess range of motion  - Assess patient's mobility; develop plan if impaired  - Assess patient's need for assistive devices and provide as appropriate  - Encourage maximum independence but intervene and supervise when necessary  - Involve family in performance of ADLs  - Assess for home care needs following discharge   - Consider OT consult to assist with ADL evaluation and planning for discharge  - Provide patient education as appropriate  Outcome: Progressing  Goal: Maintains/Returns to pre admission functional level  Description: INTERVENTIONS:  - Perform BMAT or MOVE assessment daily.   - Set and communicate daily mobility goal to care team and patient/family/caregiver. - Collaborate with rehabilitation services on mobility goals if consulted  - Perform Range of Motion 2 times a day. - Reposition patient every 2 hours.   - Dangle patient 2 times a day  - Stand patient 2 times a day  - Ambulate patient 2 times a day  - Out of bed to chair 2 times a day   - Out of bed for meals 2 times a day  - Out of bed for toileting  - Record patient progress and toleration of activity level   Outcome: Progressing     Problem: PAIN - ADULT  Goal: Verbalizes/displays adequate comfort level or baseline comfort level  Description: Interventions:  - Encourage patient to monitor pain and request assistance  - Assess pain using appropriate pain scale  - Administer analgesics based on type and severity of pain and evaluate response  - Implement non-pharmacological measures as appropriate and evaluate response  - Consider cultural and social influences on pain and pain management  - Notify physician/advanced practitioner if interventions unsuccessful or patient reports new pain  Outcome: Progressing     Problem: INFECTION - ADULT  Goal: Absence or prevention of progression during hospitalization  Description: INTERVENTIONS:  - Assess and monitor for signs and symptoms of infection  - Monitor lab/diagnostic results  - Monitor all insertion sites, i.e. indwelling lines, tubes, and drains  - Monitor endotracheal if appropriate and nasal secretions for changes in amount and color  - [place] appropriate cooling/warming therapies per order  - Administer medications as ordered  - Instruct and encourage patient and family to use good hand hygiene technique  - Identify and instruct in appropriate isolation precautions for identified infection/condition  Outcome: Progressing  Goal: Absence of fever/infection during neutropenic period  Description: INTERVENTIONS:  - Monitor WBC    Outcome: Progressing     Problem: SAFETY ADULT  Goal: Patient will remain free of falls  Description: INTERVENTIONS:  - Educate patient/family on patient safety including physical limitations  - Instruct patient to call for assistance with activity   - Consult OT/PT to assist with strengthening/mobility   - Keep Call bell within reach  - Keep bed low and locked with side rails adjusted as appropriate  - Keep care items and personal belongings within reach  - Initiate and maintain comfort rounds  - Make Fall Risk Sign visible to staff  - Offer Toileting every 2 Hours, in advance of need  - Initiate/Maintain bed alarm  - Obtain necessary fall risk management equipment: non skid socks  - Apply yellow socks and bracelet for high fall risk patients  - Consider moving patient to room near nurses station  Outcome: Progressing  Goal: Maintain or return to baseline ADL function  Description: INTERVENTIONS:  -  Assess patient's ability to carry out ADLs; assess patient's baseline for ADL function and identify physical deficits which impact ability to perform ADLs (bathing, care of mouth/teeth, toileting, grooming, dressing, etc.)  - Assess/evaluate cause of self-care deficits   - Assess range of motion  - Assess patient's mobility; develop plan if impaired  - Assess patient's need for assistive devices and provide as appropriate  - Encourage maximum independence but intervene and supervise when necessary  - Involve family in performance of ADLs  - Assess for home care needs following discharge   - Consider OT consult to assist with ADL evaluation and planning for discharge  - Provide patient education as appropriate  Outcome: Progressing  Goal: Maintains/Returns to pre admission functional level  Description: INTERVENTIONS:  - Perform BMAT or MOVE assessment daily.   - Set and communicate daily mobility goal to care team and patient/family/caregiver. - Collaborate with rehabilitation services on mobility goals if consulted  - Perform Range of Motion 2 times a day. - Reposition patient every 2 hours. - Dangle patient 2 times a day  - Stand patient 2 times a day  - Ambulate patient 2 times a day  - Out of bed to chair 2 times a day   - Out of bed for meals 2 times a day  - Out of bed for toileting  - Record patient progress and toleration of activity level   Outcome: Progressing     Problem: DISCHARGE PLANNING  Goal: Discharge to home or other facility with appropriate resources  Description: INTERVENTIONS:  - Identify barriers to discharge w/patient and caregiver  - Arrange for needed discharge resources and transportation as appropriate  - Identify discharge learning needs (meds, wound care, etc.)  - Arrange for interpretive services to assist at discharge as needed  - Refer to Case Management Department for coordinating discharge planning if the patient needs post-hospital services based on physician/advanced practitioner order or complex needs related to functional status, cognitive ability, or social support system  Outcome: Progressing     Problem: Knowledge Deficit  Goal: Patient/family/caregiver demonstrates understanding of disease process, treatment plan, medications, and discharge instructions  Description: Complete learning assessment and assess knowledge base.   Interventions:  - Provide teaching at level of understanding  - Provide teaching via preferred learning methods  Outcome: Progressing     Problem: RESPIRATORY - ADULT  Goal: Achieves optimal ventilation and oxygenation  Description: INTERVENTIONS:  - Assess for changes in respiratory status  - Assess for changes in mentation and behavior  - Position to facilitate oxygenation and minimize respiratory effort  - Oxygen administered by appropriate delivery if ordered  - Initiate smoking cessation education as indicated  - Encourage broncho-pulmonary hygiene including cough, deep breathe, Incentive Spirometry  - Assess the need for suctioning and aspirate as needed  - Assess and instruct to report SOB or any respiratory difficulty  - Respiratory Therapy support as indicated  Outcome: Progressing     Problem: Knowledge Deficit  Goal: Patient/family/caregiver demonstrates understanding of disease process, treatment plan, medications, and discharge instructions  Description: Complete learning assessment and assess knowledge base.   Interventions:  - Provide teaching at level of understanding  - Provide teaching via preferred learning methods  Outcome: Progressing

## 2023-09-19 VITALS
HEIGHT: 72 IN | DIASTOLIC BLOOD PRESSURE: 64 MMHG | SYSTOLIC BLOOD PRESSURE: 127 MMHG | HEART RATE: 70 BPM | TEMPERATURE: 98.8 F | RESPIRATION RATE: 18 BRPM | WEIGHT: 194.67 LBS | BODY MASS INDEX: 26.37 KG/M2 | OXYGEN SATURATION: 98 %

## 2023-09-19 LAB
AMPHETAMINES SERPL QL SCN: NEGATIVE
ANION GAP SERPL CALCULATED.3IONS-SCNC: 5 MMOL/L
BARBITURATES UR QL: NEGATIVE
BENZODIAZ UR QL: NEGATIVE
BILIRUB UR QL STRIP: NEGATIVE
BUN SERPL-MCNC: 9 MG/DL (ref 5–25)
CALCIUM SERPL-MCNC: 8.6 MG/DL (ref 8.4–10.2)
CHLORIDE SERPL-SCNC: 111 MMOL/L (ref 96–108)
CLARITY UR: CLEAR
CO2 SERPL-SCNC: 23 MMOL/L (ref 21–32)
COCAINE UR QL: NEGATIVE
COLOR UR: YELLOW
CREAT SERPL-MCNC: 0.93 MG/DL (ref 0.6–1.3)
ERYTHROCYTE [DISTWIDTH] IN BLOOD BY AUTOMATED COUNT: 13.2 % (ref 11.6–15.1)
GFR SERPL CREATININE-BSD FRML MDRD: 116 ML/MIN/1.73SQ M
GLUCOSE SERPL-MCNC: 96 MG/DL (ref 65–140)
GLUCOSE UR STRIP-MCNC: NEGATIVE MG/DL
HCT VFR BLD AUTO: 42.2 % (ref 36.5–49.3)
HGB BLD-MCNC: 13.5 G/DL (ref 12–17)
HGB UR QL STRIP.AUTO: NEGATIVE
KETONES UR STRIP-MCNC: NEGATIVE MG/DL
LACTATE SERPL-SCNC: 0.7 MMOL/L (ref 0.5–2)
LEUKOCYTE ESTERASE UR QL STRIP: NEGATIVE
MAGNESIUM SERPL-MCNC: 2.3 MG/DL (ref 1.9–2.7)
MCH RBC QN AUTO: 29.8 PG (ref 26.8–34.3)
MCHC RBC AUTO-ENTMCNC: 32 G/DL (ref 31.4–37.4)
MCV RBC AUTO: 93 FL (ref 82–98)
METHADONE UR QL: NEGATIVE
NITRITE UR QL STRIP: NEGATIVE
OPIATES UR QL SCN: NEGATIVE
OXYCODONE+OXYMORPHONE UR QL SCN: NEGATIVE
PCP UR QL: NEGATIVE
PH UR STRIP.AUTO: 7 [PH]
PHOSPHATE SERPL-MCNC: 2.5 MG/DL (ref 2.7–4.5)
PLATELET # BLD AUTO: 220 THOUSANDS/UL (ref 149–390)
PMV BLD AUTO: 10.8 FL (ref 8.9–12.7)
POTASSIUM SERPL-SCNC: 3.8 MMOL/L (ref 3.5–5.3)
PROT UR STRIP-MCNC: NEGATIVE MG/DL
RBC # BLD AUTO: 4.53 MILLION/UL (ref 3.88–5.62)
SODIUM SERPL-SCNC: 139 MMOL/L (ref 135–147)
SP GR UR STRIP.AUTO: 1.01 (ref 1–1.03)
THC UR QL: POSITIVE
UROBILINOGEN UR QL STRIP.AUTO: 0.2 E.U./DL
WBC # BLD AUTO: 12.3 THOUSAND/UL (ref 4.31–10.16)

## 2023-09-19 PROCEDURE — 83735 ASSAY OF MAGNESIUM: CPT | Performed by: NURSE PRACTITIONER

## 2023-09-19 PROCEDURE — 80048 BASIC METABOLIC PNL TOTAL CA: CPT | Performed by: NURSE PRACTITIONER

## 2023-09-19 PROCEDURE — 81003 URINALYSIS AUTO W/O SCOPE: CPT | Performed by: HOSPITALIST

## 2023-09-19 PROCEDURE — NC001 PR NO CHARGE: Performed by: FAMILY MEDICINE

## 2023-09-19 PROCEDURE — 83605 ASSAY OF LACTIC ACID: CPT | Performed by: HOSPITALIST

## 2023-09-19 PROCEDURE — 84100 ASSAY OF PHOSPHORUS: CPT | Performed by: NURSE PRACTITIONER

## 2023-09-19 PROCEDURE — 85027 COMPLETE CBC AUTOMATED: CPT | Performed by: HOSPITALIST

## 2023-09-19 PROCEDURE — 80307 DRUG TEST PRSMV CHEM ANLYZR: CPT | Performed by: HOSPITALIST

## 2023-09-19 PROCEDURE — 99239 HOSP IP/OBS DSCHRG MGMT >30: CPT | Performed by: FAMILY MEDICINE

## 2023-09-19 RX ORDER — ZONISAMIDE 100 MG/1
200 CAPSULE ORAL ONCE
Status: COMPLETED | OUTPATIENT
Start: 2023-09-19 | End: 2023-09-19

## 2023-09-19 RX ORDER — ZONISAMIDE 100 MG/1
300 CAPSULE ORAL DAILY
Status: DISCONTINUED | OUTPATIENT
Start: 2023-09-20 | End: 2023-09-19 | Stop reason: HOSPADM

## 2023-09-19 RX ORDER — ZONISAMIDE 100 MG/1
300 CAPSULE ORAL DAILY
Qty: 90 CAPSULE | Refills: 0 | Status: SHIPPED | OUTPATIENT
Start: 2023-09-20 | End: 2023-10-20

## 2023-09-19 RX ORDER — LEVETIRACETAM 500 MG/1
500 TABLET ORAL EVERY 12 HOURS SCHEDULED
Qty: 60 TABLET | Refills: 0 | Status: SHIPPED | OUTPATIENT
Start: 2023-09-19

## 2023-09-19 RX ORDER — LEVETIRACETAM 500 MG/1
500 TABLET ORAL EVERY 12 HOURS SCHEDULED
Status: DISCONTINUED | OUTPATIENT
Start: 2023-09-19 | End: 2023-09-19 | Stop reason: HOSPADM

## 2023-09-19 RX ADMIN — ZONISAMIDE 100 MG: 100 CAPSULE ORAL at 08:42

## 2023-09-19 RX ADMIN — ZONISAMIDE 200 MG: 100 CAPSULE ORAL at 12:17

## 2023-09-19 RX ADMIN — LEVETIRACETAM 1000 MG: 100 INJECTION, SOLUTION INTRAVENOUS at 08:38

## 2023-09-19 RX ADMIN — NICOTINE POLACRILEX 2 MG: 2 GUM, CHEWING BUCCAL at 08:43

## 2023-09-19 NOTE — PROGRESS NOTES
[address] [facility]  Progress Note  Name: Adair London I  MRN: [id number]  Unit/Bed#: -01 I Date of Admission: 9/18/2023   Date of Service: 9/19/2023 I Hospital Day: 1    Assessment/Plan   * Epileptic seizure, generalized [facility])  Assessment & Plan  · Patient has history epileptic seizures, prior reports non-compliance with medications. He has been unable to obtain his zonegran due to not being available at his new pharmacy past 2 weeks per his mother. Review of prior labs at [facility] show multiple keppra levels <2.0 and one zonegran level below normal limits supporting non-compliance. He presents with 2x seizures prior to ED, 1x in ED, and another on arrival to the floor. · - CT Head pending resuls- reviewed not abnormal   · Home doses of Keppra 500 mg p.o. twice daily he states he has been compliant with previous levels were subtherapeutic. He is on Zonegran 300 mg in the morning but has not taking it 2 to 3 weeks because his pharmacy did not have it-   · Suspect breakthrough secondary to marijuana use which lowers seizure threshold and noncompliance with medication. Loaded with Keppra and started on Keppra 1 g IV twice daily discontinue place him back on Keppra 500 mg p.o. twice daily as his home dose and restart his home dose of Zonegran  · No evidence of infection  · Wbc reactive and now coming down   · Neuro consult has been ordered  · Downgrade to ms  · aax3 start diet and dc fluids       Drug use  Assessment & Plan  Mother reports he has used meth in the past. Multiple urine drug screens positive for cannabis and amphetamines at [facility]. Check urine drug screen.  Mother reports he is on parol so she believes he has not used drugs recently  - check UDS- positive for marijuana     Leukocytosis  Assessment & Plan  Suspect due to seizure  ua neg   Trended down   Reactive due to seizure    Lactic acidosis  Assessment & Plan  2/2 seizure  Resolved  Dc fluids  No evidence of infection               VTE Pharmacologic Prophylaxis: VTE Score: 1 Low Risk (Score 0-2) - Encourage Ambulation. Patient Centered Rounds: I performed bedside rounds with nursing staff today. Discussions with Specialists or Other Care Team Provider: will discuss with neuro     Education and Discussions with Family / Patient:pt will update mother   Total Time Spent on Date of Encounter in care of patient: >35 mins. This time was spent on one or more of the following: performing physical exam; counseling and coordination of care; obtaining or reviewing history; documenting in the medical record; reviewing/ordering tests, medications or procedures; communicating with other healthcare professionals and discussing with patient's family/caregivers. Current Length of Stay: 1 day(s)  Current Patient Status: Inpatient   Certification Statement: The patient will continue to require additional inpatient hospital stay due to observe if seizure recurrence  Discharge Plan: Anticipate discharge tomorrow to home. Code Status: Level 1 - Full Code    Subjective:   Seen and examined noc omplaints    Objective:     Vitals:   Temp (24hrs), Av.3 °F (36.8 °C), Min:98 °F (36.7 °C), Max:98.9 °F (37.2 °C)    Temp:  [98 °F (36.7 °C)-98.9 °F (37.2 °C)] 98.9 °F (37.2 °C)  HR:  [] 68  Resp:  [12-24] 14  BP: (112-160)/(62-86) 116/65  SpO2:  [96 %-100 %] 98 %  Body mass index is 26.4 kg/m². Input and Output Summary (last 24 hours): Intake/Output Summary (Last 24 hours) at 2023 1147  Last data filed at 2023 0501  Gross per 24 hour   Intake 3472.92 ml   Output 1800 ml   Net 1672.92 ml       Physical Exam:   Physical Exam  Vitals and nursing note reviewed. Constitutional:       General: He is not in acute distress. Appearance: He is well-developed. HENT:      Head: Normocephalic and atraumatic.    Eyes:      Conjunctiva/sclera: Conjunctivae normal.   Cardiovascular:      Rate and Rhythm: Normal rate and regular rhythm. Heart sounds: No murmur heard. Pulmonary:      Effort: Pulmonary effort is normal. No respiratory distress. Breath sounds: Normal breath sounds. No wheezing or rales. Abdominal:      General: Bowel sounds are normal. There is no distension. Palpations: Abdomen is soft. Tenderness: There is no abdominal tenderness. Musculoskeletal:         General: No swelling. Cervical back: Neck supple. Skin:     General: Skin is warm and dry. Capillary Refill: Capillary refill takes less than 2 seconds. Neurological:      Mental Status: He is alert and oriented to person, place, and time.    Psychiatric:         Mood and Affect: Mood normal.          Additional Data:     Labs:  Results from last 7 days   Lab Units 09/19/23  0513 09/18/23  1254   WBC Thousand/uL 12.30* 21.44*   HEMOGLOBIN g/dL 13.5 15.7   HEMATOCRIT % 42.2 51.8*   PLATELETS Thousands/uL 220 294   NEUTROS PCT %  --  66   LYMPHS PCT %  --  18   MONOS PCT %  --  13*   EOS PCT %  --  1     Results from last 7 days   Lab Units 09/19/23  0513 09/18/23  1254   SODIUM mmol/L 139 141   POTASSIUM mmol/L 3.8 3.4*   CHLORIDE mmol/L 111* 103   CO2 mmol/L 23 12*   BUN mg/dL 9 12   CREATININE mg/dL 0.93 0.93   ANION GAP mmol/L 5 26   CALCIUM mg/dL 8.6 9.4   ALBUMIN g/dL  --  5.0   TOTAL BILIRUBIN mg/dL  --  0.41   ALK PHOS U/L  --  69   ALT U/L  --  14   AST U/L  --  18   GLUCOSE RANDOM mg/dL 96 118         Results from last 7 days   Lab Units 09/18/23  1525 09/18/23  1238   POC GLUCOSE mg/dl 114 117         Results from last 7 days   Lab Units 09/19/23  0513 09/18/23  1552 09/18/23  1254   LACTIC ACID mmol/L 0.7 9.6* 20.9*       Lines/Drains:  Invasive Devices     Peripheral Intravenous Line  Duration           Peripheral IV 09/18/23 Distal;Left;Upper;Ventral (anterior) Arm <1 day                      Imaging: Reviewed radiology reports from this admission including: CT head    Recent Cultures (last 7 days): Last 24 Hours Medication List:   Current Facility-Administered Medications   Medication Dose Route Frequency Provider Last Rate   • acetaminophen  650 mg Oral Q4H PRN SOLITARIO Martínez     • levETIRAcetam  500 mg Oral Q12H [address] Kimberly Williamson MD     • LORazepam  2 mg Intravenous Q6H PRN Adam Connell Counts, DO     • nicotine  1 patch Transdermal Daily Adam Connell Counts, DO     • nicotine polacrilex  2 mg Oral Q2H PRN Mary Lou Dash PA-C     • zonisamide  200 mg Oral Once Kimberly Williamson MD     • [START ON 9/20/2023] zonisamide  300 mg Oral Daily Kimberly Williamson MD          Today, Patient Was Seen By: Kimberly Williamson MD    **Please Note: This note may have been constructed using a voice recognition system. **

## 2023-09-19 NOTE — ASSESSMENT & PLAN NOTE
Mother reports he has used meth in the past. Multiple urine drug screens positive for cannabis and amphetamines at [email or web address]. Check urine drug screen.  Mother reports he is on parol so she believes he has not used drugs recently  - check UDS- positive for marijuana

## 2023-09-19 NOTE — ASSESSMENT & PLAN NOTE
· Patient has history epileptic seizures, prior reports non-compliance with medications. He has been unable to obtain his zonegran due to not being available at his new pharmacy past 2 weeks per his mother. Review of prior labs at [facility] show multiple keppra levels <2.0 and one zonegran level below normal limits supporting non-compliance. He presents with 2x seizures prior to ED, 1x in ED, and another on arrival to the floor. · - CT Head pending resuls- reviewed not abnormal   · Home doses of Keppra 500 mg p.o. twice daily he states he has been compliant with previous levels were subtherapeutic. He is on Zonegran 300 mg in the morning but has not taking it 2 to 3 weeks because his pharmacy did not have it-   · Suspect breakthrough secondary to marijuana use which lowers seizure threshold and noncompliance with medication.   Loaded with Keppra and started on Keppra 1 g IV twice daily discontinue place him back on Keppra 500 mg p.o. twice daily as his home dose and restart his home dose of Zonegran  · No evidence of infection  · Wbc reactive and now coming down   · Neuro consult has been ordered  · Downgrade to ms  · aax3 start diet and dc fluids

## 2023-09-19 NOTE — ASSESSMENT & PLAN NOTE
Mother reports he has used meth in the past. Multiple urine drug screens positive for cannabis and amphetamines at [facility]. Check urine drug screen.  Mother reports he is on parol so she believes he has not used drugs recently  - check UDS- positive for marijuana

## 2023-09-19 NOTE — ASSESSMENT & PLAN NOTE
· Patient has history epileptic seizures, prior reports non-compliance with medications. He has been unable to obtain his zonegran due to not being available at his new pharmacy past 2 weeks per his mother. Review of prior labs at [facility] show multiple keppra levels <2.0 and one zonegran level below normal limits supporting non-compliance. He presents with 2x seizures prior to ED, 1x in ED, and another on arrival to the floor. · - CT Head pending resuls- reviewed not abnormal   · Home doses of Keppra 500 mg p.o. twice daily he states he has been compliant with previous levels were subtherapeutic. He is on Zonegran 300 mg in the morning but has not taking it 2 to 3 weeks because his pharmacy did not have it-   · Suspect breakthrough secondary to marijuana use which lowers seizure threshold and noncompliance with medication.   Loaded with Keppra and started on Keppra 1 g IV twice daily discontinue place him back on Keppra 500 mg p.o. twice daily as his home dose and restart his home dose of Zonegran  · No evidence of infection  · Wbc reactive and now coming down   · Neuro consult has been ordered they have not seen will place ambulatory referral for looks like John has tried to call him to set up a neuro appointment  · Downgrade to ms  · aax3 start diet and dc fluids   Has been doing well he is asking to go home I see no contraindication will discharge I called [facility] they have in stock both of his medications

## 2023-09-19 NOTE — DISCHARGE SUMMARY
[address] [facility]  Discharge- Jones Ros. 2000, 26 y.o. male MRN: [id number]  Unit/Bed#: -01 Encounter: [id number]  Primary Care Provider: No primary care provider on file. Date and time admitted to hospital: 9/18/2023 12:36 PM    * Epileptic seizure, generalized [facility])  Assessment & Plan  · Patient has history epileptic seizures, prior reports non-compliance with medications. He has been unable to obtain his zonegran due to not being available at his new pharmacy past 2 weeks per his mother. Review of prior labs at [facility] show multiple keppra levels <2.0 and one zonegran level below normal limits supporting non-compliance. He presents with 2x seizures prior to ED, 1x in ED, and another on arrival to the floor. · - CT Head pending resuls- reviewed not abnormal   · Home doses of Keppra 500 mg p.o. twice daily he states he has been compliant with previous levels were subtherapeutic. He is on Zonegran 300 mg in the morning but has not taking it 2 to 3 weeks because his pharmacy did not have it-   · Suspect breakthrough secondary to marijuana use which lowers seizure threshold and noncompliance with medication. Loaded with Keppra and started on Keppra 1 g IV twice daily discontinue place him back on Keppra 500 mg p.o. twice daily as his home dose and restart his home dose of Zonegran  · No evidence of infection  · Wbc reactive and now coming down   · Neuro consult has been ordered they have not seen will place ambulatory referral for looks like John has tried to call him to set up a neuro appointment  · Downgrade to ms  · aax3 start diet and dc fluids   Has been doing well he is asking to go home I see no contraindication will discharge I called [facility] they have in stock both of his medications      Drug use  Assessment & Plan  Mother reports he has used meth in the past. Multiple urine drug screens positive for cannabis and amphetamines at [facility].  Check urine drug screen. Mother reports he is on parol so she believes he has not used drugs recently  - check UDS- positive for marijuana     Leukocytosis  Assessment & Plan  Suspect due to seizure  ua neg   Trended down   Reactive due to seizure    Lactic acidosis  Assessment & Plan  2/2 seizure  Resolved  Dc fluids  No evidence of infection        Medical Problems     Resolved Problems  Date Reviewed: 9/19/2023   None       Discharging Physician / Practitioner: Zoe Rodríguez MD  PCP: No primary care provider on file. Admission Date:   Admission Orders (From admission, onward)     Ordered        09/18/23 [address]  Once                      Discharge Date: 09/19/23    Consultations During Hospital Stay:  · none    Procedures Performed:   · none    Significant Findings / Test Results:   · CT brain reviewed by me no acute abnormality and final is pending    Incidental Findings:   · none    Test Results Pending at Discharge (will require follow up):   · Ct brain final read     Outpatient Tests Requested:  · none    Complications:  none    Reason for Admission: Breakthrough seizure    Hospital Course:   Liliam Desai is a 26 y.o. male patient who originally presented to the hospital on 9/18/2023 due to breakthrough seizures with history of noncompliance seems he has not been taking his Zonegran for 2 to 3 weeks as the pharmacy did not have them he is out of care home 2 to 3 weeks he states he has been taking his Keppra for previous hospitalization showed low Keppra level. He was loaded with Keppra I did switch him back to his doses of home dose of 300 of Zonegran and 500 twice daily of [facility] neurology was consulted but they never seen patient - ambulatory referral placed . He has been doing well without recurrence of seizure and will be discharged home on his home meds- verified with pharmacy they do have them in stock .   No evidence of infection         Please see above list of diagnoses and related plan for additional information. Condition at Discharge: stable    Discharge Day Visit / Exam:   * Please refer to separate progress note for these details *    Discussion with Family: Updated  (friend) via phone. Discharge instructions/Information to patient and family:   See after visit summary for information provided to patient and family. Provisions for Follow-Up Care:  See after visit summary for information related to follow-up care and any pertinent home health orders. Disposition:   Home    Planned Readmission: no     Discharge Statement:  I spent >35 minutes discharging the patient. This time was spent on the day of discharge. I had direct contact with the patient on the day of discharge. Greater than 50% of the total time was spent examining patient, answering all patient questions, arranging and discussing plan of care with patient as well as directly providing post-discharge instructions. Additional time then spent on discharge activities. Discharge Medications:  See after visit summary for reconciled discharge medications provided to patient and/or family.       **Please Note: This note may have been constructed using a voice recognition system**

## 2023-09-21 LAB — LEVETIRACETAM SERPL-MCNC: 16.4 UG/ML (ref 10–40)

## 2023-09-27 NOTE — UTILIZATION REVIEW
NOTIFICATION OF INPATIENT ADMISSION   AUTHORIZATION REQUEST   SERVICING FACILITY:   [address]  Tax ID: [id number]  NPI: [id number] ATTENDING PROVIDER:  Attending Name and NPI#: Juan Carlos Granados Md [[id number]]  Address: [address] [place] [address]  Phone: [phone]   ADMISSION INFORMATION:  Place of Service: Inpatient [address]  Place of Service Code: 21  Inpatient Admission Date/Time: 9/18/23  2:45 PM  Discharge Date/Time: 9/19/2023  5:11 PM  Admitting Diagnosis Code/Description:  Seizure ([address]) [R56.9]  Seizures ([address]) [R56.9]     UTILIZATION REVIEW CONTACT:  Patrice Boateng Utilization   Network Utilization Review Department  Phone: [phone]  Fax [phone]  Email: [email or web address]  Contact for approvals/pending authorizations, clinical reviews, and discharge. PHYSICIAN ADVISORY SERVICES:  Medical Necessity Denial & [facility]sg[facility] Review  Phone: [phone]  Fax: [phone]  Email: [email or web address]

## 2023-11-28 ENCOUNTER — HOSPITAL ENCOUNTER (EMERGENCY)
Facility: HOSPITAL | Age: 23
Discharge: HOME | End: 2023-11-28
Attending: EMERGENCY MEDICINE
Payer: MEDICARE

## 2023-11-28 VITALS
BODY MASS INDEX: 26.43 KG/M2 | OXYGEN SATURATION: 98 % | RESPIRATION RATE: 21 BRPM | DIASTOLIC BLOOD PRESSURE: 87 MMHG | SYSTOLIC BLOOD PRESSURE: 144 MMHG | HEART RATE: 71 BPM | WEIGHT: 194.89 LBS | TEMPERATURE: 97.5 F

## 2023-11-28 DIAGNOSIS — R56.9 SEIZURE (HCC): Primary | ICD-10-CM

## 2023-11-28 LAB
ALBUMIN SERPL BCP-MCNC: 4.3 G/DL (ref 3.5–5)
ALP SERPL-CCNC: 68 U/L (ref 34–104)
ALT SERPL W P-5'-P-CCNC: 13 U/L (ref 7–52)
ANION GAP SERPL CALCULATED.3IONS-SCNC: 10 MMOL/L
AST SERPL W P-5'-P-CCNC: 18 U/L (ref 13–39)
BASOPHILS # BLD AUTO: 0.04 THOUSANDS/ÂΜL (ref 0–0.1)
BASOPHILS NFR BLD AUTO: 0 % (ref 0–1)
BILIRUB SERPL-MCNC: 0.3 MG/DL (ref 0.2–1)
BUN SERPL-MCNC: 13 MG/DL (ref 5–25)
CALCIUM SERPL-MCNC: 8.9 MG/DL (ref 8.4–10.2)
CHLORIDE SERPL-SCNC: 109 MMOL/L (ref 96–108)
CO2 SERPL-SCNC: 20 MMOL/L (ref 21–32)
CREAT SERPL-MCNC: 0.83 MG/DL (ref 0.6–1.3)
EOSINOPHIL # BLD AUTO: 0.09 THOUSAND/ÂΜL (ref 0–0.61)
EOSINOPHIL NFR BLD AUTO: 1 % (ref 0–6)
ERYTHROCYTE [DISTWIDTH] IN BLOOD BY AUTOMATED COUNT: 11.9 % (ref 11.6–15.1)
GFR SERPL CREATININE-BSD FRML MDRD: 125 ML/MIN/1.73SQ M
GLUCOSE SERPL-MCNC: 113 MG/DL (ref 65–140)
HCT VFR BLD AUTO: 45.1 % (ref 36.5–49.3)
HGB BLD-MCNC: 14.7 G/DL (ref 12–17)
IMM GRANULOCYTES # BLD AUTO: 0.06 THOUSAND/UL (ref 0–0.2)
IMM GRANULOCYTES NFR BLD AUTO: 1 % (ref 0–2)
LYMPHOCYTES # BLD AUTO: 2.1 THOUSANDS/ÂΜL (ref 0.6–4.47)
LYMPHOCYTES NFR BLD AUTO: 18 % (ref 14–44)
MCH RBC QN AUTO: 30 PG (ref 26.8–34.3)
MCHC RBC AUTO-ENTMCNC: 32.6 G/DL (ref 31.4–37.4)
MCV RBC AUTO: 92 FL (ref 82–98)
MONOCYTES # BLD AUTO: 1.02 THOUSAND/ÂΜL (ref 0.17–1.22)
MONOCYTES NFR BLD AUTO: 9 % (ref 4–12)
NEUTROPHILS # BLD AUTO: 8.65 THOUSANDS/ÂΜL (ref 1.85–7.62)
NEUTS SEG NFR BLD AUTO: 71 % (ref 43–75)
NRBC BLD AUTO-RTO: 0 /100 WBCS
PLATELET # BLD AUTO: 265 THOUSANDS/UL (ref 149–390)
PMV BLD AUTO: 11.6 FL (ref 8.9–12.7)
POTASSIUM SERPL-SCNC: 4 MMOL/L (ref 3.5–5.3)
PROT SERPL-MCNC: 6.4 G/DL (ref 6.4–8.4)
RBC # BLD AUTO: 4.9 MILLION/UL (ref 3.88–5.62)
SODIUM SERPL-SCNC: 139 MMOL/L (ref 135–147)
WBC # BLD AUTO: 11.96 THOUSAND/UL (ref 4.31–10.16)

## 2023-11-28 PROCEDURE — 99285 EMERGENCY DEPT VISIT HI MDM: CPT | Performed by: EMERGENCY MEDICINE

## 2023-11-28 PROCEDURE — 96365 THER/PROPH/DIAG IV INF INIT: CPT

## 2023-11-28 PROCEDURE — 93005 ELECTROCARDIOGRAM TRACING: CPT

## 2023-11-28 PROCEDURE — 36415 COLL VENOUS BLD VENIPUNCTURE: CPT | Performed by: EMERGENCY MEDICINE

## 2023-11-28 PROCEDURE — 96361 HYDRATE IV INFUSION ADD-ON: CPT

## 2023-11-28 PROCEDURE — 99284 EMERGENCY DEPT VISIT MOD MDM: CPT

## 2023-11-28 PROCEDURE — 80053 COMPREHEN METABOLIC PANEL: CPT | Performed by: EMERGENCY MEDICINE

## 2023-11-28 PROCEDURE — 85025 COMPLETE CBC W/AUTO DIFF WBC: CPT | Performed by: EMERGENCY MEDICINE

## 2023-11-28 PROCEDURE — 96375 TX/PRO/DX INJ NEW DRUG ADDON: CPT

## 2023-11-28 RX ORDER — LORAZEPAM 2 MG/ML
1 INJECTION INTRAMUSCULAR ONCE
Status: COMPLETED | OUTPATIENT
Start: 2023-11-28 | End: 2023-11-28

## 2023-11-28 RX ORDER — ZONISAMIDE 100 MG/1
100 CAPSULE ORAL DAILY
Status: DISCONTINUED | OUTPATIENT
Start: 2023-11-28 | End: 2023-11-28 | Stop reason: HOSPADM

## 2023-11-28 RX ADMIN — LORAZEPAM 1 MG: 2 INJECTION INTRAMUSCULAR; INTRAVENOUS at 11:40

## 2023-11-28 RX ADMIN — ZONISAMIDE 100 MG: 100 CAPSULE ORAL at 11:40

## 2023-11-28 RX ADMIN — LEVETIRACETAM 1000 MG: 100 INJECTION, SOLUTION INTRAVENOUS at 11:46

## 2023-11-28 RX ADMIN — SODIUM CHLORIDE 1000 ML: 0.9 INJECTION, SOLUTION INTRAVENOUS at 11:39

## 2023-11-28 NOTE — ED NOTES
This RN being approached by patient's family in [facility] at this time, outside of patient's room, this RN asked if a note can be provided for court tomorrow as patient is scheduled to appear in court. This RN information family that patient has not been set to disposition yet & if patient is discharged from facility, he can appear in court scheduled for tomorrow.      Shazia Nelson [place]  11/28/23 [id number]

## 2023-11-28 NOTE — DISCHARGE INSTRUCTIONS
Please contact your neurologist as soon as possible to arrange follow-up within the next few days  Continue seizure medicines as prescribed  Return immediately if worse or any new symptoms

## 2023-11-29 LAB
ATRIAL RATE: 83 BPM
P AXIS: 71 DEGREES
PR INTERVAL: 158 MS
QRS AXIS: 73 DEGREES
QRSD INTERVAL: 84 MS
QT INTERVAL: 382 MS
QTC INTERVAL: 448 MS
T WAVE AXIS: 63 DEGREES
VENTRICULAR RATE: 83 BPM

## 2023-12-01 NOTE — ED PROVIDER NOTES
History  Chief Complaint   Patient presents with    Seizure - Prior Hx Of     C/o 2 seizures PTA; prior hx of same. Aox4 upon arrival; ambulated to room without assistance     77-year-old male via EMS from sisters were reported to have 2 brief seizure episodes, briefly postictal and currently fatigued. Notes generally achy, mild headache. States he is compliant with his neuroleptics. States he currently uses cannabis and denies recent use of methamphetamine. Notes he is under the care of a neurologist      History provided by:  Patient  Seizure - Prior Hx Of  Seizure activity on arrival: no    Seizure type:  Grand mal  Initial focality:  Unable to specify  Postictal symptoms: somnolence    Return to baseline: yes    Severity:  Unable to specify  Number of seizures this episode:  2  Progression:  Resolved  Context: drug use    Recent head injury:  No recent head injuries  PTA treatment:  None  History of seizures: yes        Prior to Admission Medications   Prescriptions Last Dose Informant Patient Reported? Taking?   levETIRAcetam (KEPPRA) 500 mg tablet 11/28/2023  No Yes   Sig: Take 1 tablet (500 mg total) by mouth every 12 (twelve) hours   zonisamide (ZONEGRAN) 100 mg capsule 11/28/2023  No Yes   Sig: Take 3 capsules (300 mg total) by mouth daily Do not start before September 20, 2023. Facility-Administered Medications: None       Past Medical History:   Diagnosis Date    Seizure [facility])        History reviewed. No pertinent surgical history. History reviewed. No pertinent family history. I have reviewed and agree with the history as documented.     E-Cigarette/Vaping     E-Cigarette/Vaping Substances     Social History     Tobacco Use    Smoking status: Every Day     Packs/day: 1.00     Years: 5.00     Total pack years: 5.00     Types: Cigarettes    Smokeless tobacco: Never   Substance Use Topics    Alcohol use: Yes     Comment: sociall    Drug use: Not Currently     Comment: per mother pt has history of meth amphetamine abuse       Review of Systems   All other systems reviewed and are negative. Physical Exam  Physical Exam  Vitals and nursing note reviewed. Constitutional:       Comments: Pleasant, comfortable-appearing   HENT:      Head: Normocephalic and atraumatic. Mouth/Throat:      Mouth: Mucous membranes are moist.      Pharynx: Oropharynx is clear. Eyes:      Conjunctiva/sclera: Conjunctivae normal.      Pupils: Pupils are equal, round, and reactive to light. Cardiovascular:      Rate and Rhythm: Normal rate and regular rhythm. Heart sounds: Normal heart sounds. Pulmonary:      Effort: Pulmonary effort is normal.      Breath sounds: Normal breath sounds. Abdominal:      General: Bowel sounds are normal. There is no distension. Palpations: Abdomen is soft. Tenderness: There is no abdominal tenderness. Musculoskeletal:         General: No deformity. Cervical back: Neck supple. Skin:     General: Skin is warm and dry. Neurological:      General: No focal deficit present. Mental Status: He is alert and oriented to person, place, and time. Cranial Nerves: No cranial nerve deficit. Sensory: No sensory deficit. Motor: No weakness. Coordination: Coordination normal.      Gait: Gait normal.   Psychiatric:         Behavior: Behavior normal.         Thought Content:  Thought content normal.         Judgment: Judgment normal.         Vital Signs  ED Triage Vitals [11/28/23 1116]   Temperature Pulse Respirations Blood Pressure SpO2   97.5 °F (36.4 °C) 87 18 136/93 98 %      Temp Source Heart Rate Source Patient Position - Orthostatic VS BP Location FiO2 (%)   Temporal Monitor Lying Right arm --      Pain Score       --           Vitals:    11/28/23 1116 11/28/23 1200 11/28/23 1215 11/28/23 1245   BP: 136/93 132/93 139/90 144/87   Pulse: 87 80 72 71   Patient Position - Orthostatic VS: Lying Lying Lying          Visual Acuity      ED Medications  Medications   levETIRAcetam (KEPPRA) 1,000 mg in sodium chloride 0.9 % 100 mL IVPB (0 mg Intravenous Stopped 11/28/23 1218)   LORazepam (ATIVAN) injection 1 mg (1 mg Intravenous Given 11/28/23 1140)   sodium chloride 0.9 % bolus 1,000 mL (0 mL Intravenous Stopped 11/28/23 1258)       Diagnostic Studies  Results Reviewed       Procedure Component Value Units Date/Time    Comprehensive metabolic panel [[id number]]  (Abnormal) Collected: 11/28/23 1145    Lab Status: Final result Specimen: Blood from Arm, Left Updated: 11/28/23 1213     Sodium 139 mmol/L      Potassium 4.0 mmol/L      Chloride 109 mmol/L      CO2 20 mmol/L      ANION GAP 10 mmol/L      BUN 13 mg/dL      Creatinine 0.83 mg/dL      Glucose 113 mg/dL      Calcium 8.9 mg/dL      AST 18 U/L      ALT 13 U/L      Alkaline Phosphatase 68 U/L      Total Protein 6.4 g/dL      Albumin 4.3 g/dL      Total Bilirubin 0.30 mg/dL      eGFR 125 ml/min/1.73sq m     Narrative:      [place] guidelines for Chronic Kidney Disease (CKD):     Stage 1 with normal or high GFR (GFR > 90 mL/min/1.73 square meters)    Stage 2 Mild CKD (GFR = 60-89 mL/min/1.73 square meters)    Stage 3A Moderate CKD (GFR = 45-59 mL/min/1.73 square meters)    Stage 3B Moderate CKD (GFR = 30-44 mL/min/1.73 square meters)    Stage 4 Severe CKD (GFR = 15-29 mL/min/1.73 square meters)    Stage 5 End Stage CKD (GFR <15 mL/min/1.73 square meters)  Note: GFR calculation is accurate only with a steady state creatinine    CBC and differential [[id number]]  (Abnormal) Collected: 11/28/23 1145    Lab Status: Final result Specimen: Blood from Arm, Left Updated: 11/28/23 1153     WBC 11.96 Thousand/uL      RBC 4.90 Million/uL      Hemoglobin 14.7 g/dL      Hematocrit 45.1 %      MCV 92 fL      MCH 30.0 pg      MCHC 32.6 g/dL      RDW 11.9 %      MPV 11.6 fL      Platelets 223 Thousands/uL      nRBC 0 /100 WBCs      Neutrophils Relative 71 %      Immat GRANS % 1 % Lymphocytes Relative 18 %      Monocytes Relative 9 %      Eosinophils Relative 1 %      Basophils Relative 0 %      Neutrophils Absolute 8.65 Thousands/µL      Immature Grans Absolute 0.06 Thousand/uL      Lymphocytes Absolute 2.10 Thousands/µL      Monocytes Absolute 1.02 Thousand/µL      Eosinophils Absolute 0.09 Thousand/µL      Basophils Absolute 0.04 Thousands/µL                    No orders to display              Procedures  Procedures         ED Course  ED Course as of 12/01/23 1202   Tue Nov 28, 2023   1128 EKG 11:13 AM normal sinus rhythm rate 83 normal axis normal intervals no ST elevation or depression interpreted by me   1226 WBC(!): 11.96   1226 CO2(!): 20   1252 Sleeping, wakes easily remained stable for close outpatient follow-up, father present and supportive                               SBIRT 22yo+      Flowsheet Row Most Recent Value   Initial Alcohol Screen: US AUDIT-C     1. How often do you have a drink containing alcohol? 1 Filed at: 11/28/2023 1115   2. How many drinks containing alcohol do you have on a typical day you are drinking? 0 Filed at: 11/28/2023 1115   3a. Male UNDER 65: How often do you have five or more drinks on one occasion? 0 Filed at: 11/28/2023 1115   Audit-C Score 1 Filed at: 11/28/2023 1115   DANIELE: How many times in the past year have you. .. Used an illegal drug or used a prescription medication for non-medical reasons? Never Filed at: 11/28/2023 1115                      Medical Decision Making  Amount and/or Complexity of Data Reviewed  Labs: ordered. Decision-making details documented in ED Course. ECG/medicine tests: ordered and independent interpretation performed. Decision-making details documented in ED Course. Risk  Prescription drug management.              Disposition  Final diagnoses:   Seizure [facility])     Time reflects when diagnosis was documented in both MDM as applicable and the Disposition within this note       Time User Action Codes Description Comment    11/28/2023 12:51 PM Stephenie Santa Add [R56.9] Seizure [facility])           ED Disposition       ED Disposition   Discharge    Condition   Stable    Date/Time   Tue Nov 28, 2023 1240 [facility]. discharge to home/self care. Follow-up Information    None         Discharge Medication List as of 11/28/2023 12:52 PM        CONTINUE these medications which have NOT CHANGED    Details   levETIRAcetam (KEPPRA) 500 mg tablet Take 1 tablet (500 mg total) by mouth every 12 (twelve) hours, Starting Tue 9/19/2023, Normal      zonisamide (ZONEGRAN) 100 mg capsule Take 3 capsules (300 mg total) by mouth daily Do not start before September 20, 2023., Starting Wed 9/20/2023, Until Tue 11/28/2023, Normal             No discharge procedures on file.     PDMP Review       None            ED Provider  Electronically Signed by             Stephenie Santa DO  12/01/23 1224

## 2023-12-20 ENCOUNTER — HOSPITAL ENCOUNTER (EMERGENCY)
Facility: HOSPITAL | Age: 23
Discharge: HOME | End: 2023-12-20
Attending: EMERGENCY MEDICINE
Payer: MEDICARE

## 2023-12-20 VITALS
SYSTOLIC BLOOD PRESSURE: 145 MMHG | RESPIRATION RATE: 18 BRPM | HEIGHT: 72 IN | WEIGHT: 195 LBS | OXYGEN SATURATION: 98 % | TEMPERATURE: 98.3 F | HEART RATE: 56 BPM | DIASTOLIC BLOOD PRESSURE: 81 MMHG | BODY MASS INDEX: 26.41 KG/M2

## 2023-12-20 DIAGNOSIS — G40.919 BREAKTHROUGH SEIZURE (HCC): Primary | ICD-10-CM

## 2023-12-20 DIAGNOSIS — R56.9 SEIZURES (HCC): ICD-10-CM

## 2023-12-20 LAB
AMPHETAMINES SERPL QL SCN: POSITIVE
ANION GAP SERPL CALCULATED.3IONS-SCNC: 6 MMOL/L
BACTERIA UR QL AUTO: NORMAL /HPF
BARBITURATES UR QL: NEGATIVE
BASOPHILS # BLD MANUAL: 0 THOUSAND/UL (ref 0–0.1)
BASOPHILS NFR MAR MANUAL: 0 % (ref 0–1)
BENZODIAZ UR QL: NEGATIVE
BILIRUB UR QL STRIP: NEGATIVE
BUN SERPL-MCNC: 15 MG/DL (ref 5–25)
CALCIUM SERPL-MCNC: 9.4 MG/DL (ref 8.4–10.2)
CHLORIDE SERPL-SCNC: 107 MMOL/L (ref 96–108)
CLARITY UR: CLEAR
CO2 SERPL-SCNC: 24 MMOL/L (ref 21–32)
COCAINE UR QL: NEGATIVE
COLOR UR: YELLOW
CREAT SERPL-MCNC: 0.79 MG/DL (ref 0.6–1.3)
EOSINOPHIL # BLD MANUAL: 0 THOUSAND/UL (ref 0–0.4)
EOSINOPHIL NFR BLD MANUAL: 0 % (ref 0–6)
ERYTHROCYTE [DISTWIDTH] IN BLOOD BY AUTOMATED COUNT: 11.9 % (ref 11.6–15.1)
GFR SERPL CREATININE-BSD FRML MDRD: 128 ML/MIN/1.73SQ M
GLUCOSE SERPL-MCNC: 104 MG/DL (ref 65–140)
GLUCOSE UR STRIP-MCNC: NEGATIVE MG/DL
HCT VFR BLD AUTO: 44.1 % (ref 36.5–49.3)
HGB BLD-MCNC: 14.7 G/DL (ref 12–17)
HGB UR QL STRIP.AUTO: ABNORMAL
KETONES UR STRIP-MCNC: ABNORMAL MG/DL
LEUKOCYTE ESTERASE UR QL STRIP: NEGATIVE
LG PLATELETS BLD QL SMEAR: PRESENT
LYMPHOCYTES # BLD AUTO: 1.8 THOUSAND/UL (ref 0.6–4.47)
LYMPHOCYTES # BLD AUTO: 8 % (ref 14–44)
MCH RBC QN AUTO: 30.3 PG (ref 26.8–34.3)
MCHC RBC AUTO-ENTMCNC: 33.3 G/DL (ref 31.4–37.4)
MCV RBC AUTO: 91 FL (ref 82–98)
METHADONE UR QL: NEGATIVE
MONOCYTES # BLD AUTO: 1.47 THOUSAND/UL (ref 0–1.22)
MONOCYTES NFR BLD: 9 % (ref 4–12)
NEUTROPHILS # BLD MANUAL: 13.07 THOUSAND/UL (ref 1.85–7.62)
NEUTS SEG NFR BLD AUTO: 80 % (ref 43–75)
NITRITE UR QL STRIP: NEGATIVE
NON-SQ EPI CELLS URNS QL MICRO: NORMAL /HPF
OPIATES UR QL SCN: NEGATIVE
OXYCODONE+OXYMORPHONE UR QL SCN: NEGATIVE
PCP UR QL: NEGATIVE
PH UR STRIP.AUTO: 6.5 [PH]
PLATELET # BLD AUTO: 216 THOUSANDS/UL (ref 149–390)
PLATELET BLD QL SMEAR: ADEQUATE
PMV BLD AUTO: 10.7 FL (ref 8.9–12.7)
POTASSIUM SERPL-SCNC: 3.8 MMOL/L (ref 3.5–5.3)
PROT UR STRIP-MCNC: NEGATIVE MG/DL
RBC # BLD AUTO: 4.85 MILLION/UL (ref 3.88–5.62)
RBC #/AREA URNS AUTO: NORMAL /HPF
SODIUM SERPL-SCNC: 137 MMOL/L (ref 135–147)
SP GR UR STRIP.AUTO: >=1.03 (ref 1–1.03)
THC UR QL: POSITIVE
UROBILINOGEN UR QL STRIP.AUTO: 0.2 E.U./DL
VARIANT LYMPHS # BLD AUTO: 3 %
WBC # BLD AUTO: 16.34 THOUSAND/UL (ref 4.31–10.16)
WBC #/AREA URNS AUTO: NORMAL /HPF
WBC TOXIC VACUOLES BLD QL SMEAR: PRESENT

## 2023-12-20 PROCEDURE — 80203 DRUG SCREEN QUANT ZONISAMIDE: CPT | Performed by: EMERGENCY MEDICINE

## 2023-12-20 PROCEDURE — 80177 DRUG SCRN QUAN LEVETIRACETAM: CPT | Performed by: EMERGENCY MEDICINE

## 2023-12-20 PROCEDURE — 85007 BL SMEAR W/DIFF WBC COUNT: CPT | Performed by: EMERGENCY MEDICINE

## 2023-12-20 PROCEDURE — 85027 COMPLETE CBC AUTOMATED: CPT | Performed by: EMERGENCY MEDICINE

## 2023-12-20 PROCEDURE — 80307 DRUG TEST PRSMV CHEM ANLYZR: CPT | Performed by: EMERGENCY MEDICINE

## 2023-12-20 PROCEDURE — 80048 BASIC METABOLIC PNL TOTAL CA: CPT | Performed by: EMERGENCY MEDICINE

## 2023-12-20 PROCEDURE — 99284 EMERGENCY DEPT VISIT MOD MDM: CPT

## 2023-12-20 PROCEDURE — 99285 EMERGENCY DEPT VISIT HI MDM: CPT | Performed by: EMERGENCY MEDICINE

## 2023-12-20 PROCEDURE — 81001 URINALYSIS AUTO W/SCOPE: CPT | Performed by: EMERGENCY MEDICINE

## 2023-12-20 PROCEDURE — 36415 COLL VENOUS BLD VENIPUNCTURE: CPT | Performed by: EMERGENCY MEDICINE

## 2023-12-20 PROCEDURE — 96365 THER/PROPH/DIAG IV INF INIT: CPT

## 2023-12-20 RX ORDER — ZONISAMIDE 100 MG/1
300 CAPSULE ORAL DAILY
Qty: 90 CAPSULE | Refills: 0 | Status: SHIPPED | OUTPATIENT
Start: 2023-12-20 | End: 2024-01-19

## 2023-12-20 RX ORDER — LEVETIRACETAM 500 MG/1
500 TABLET ORAL EVERY 12 HOURS SCHEDULED
Qty: 60 TABLET | Refills: 0 | Status: SHIPPED | OUTPATIENT
Start: 2023-12-20

## 2023-12-20 RX ORDER — ZONISAMIDE 100 MG/1
300 CAPSULE ORAL DAILY
Qty: 90 CAPSULE | Refills: 0 | Status: SHIPPED | OUTPATIENT
Start: 2023-12-20 | End: 2023-12-20

## 2023-12-20 RX ORDER — ONDANSETRON 2 MG/ML
1 INJECTION INTRAMUSCULAR; INTRAVENOUS ONCE
Status: COMPLETED | OUTPATIENT
Start: 2023-12-20 | End: 2023-12-20

## 2023-12-20 RX ADMIN — LEVETIRACETAM 1000 MG: 100 INJECTION, SOLUTION INTRAVENOUS at 13:54

## 2023-12-20 NOTE — ED PROVIDER NOTES
History  Chief Complaint   Patient presents with    Seizure - Prior Hx Of     Pt reports hx of seizure; 2 today; last one on 12/15 - reports he's taking his medication as prescribed     Patient had 2 seizures today.  Witnessed by father.  Patient has had seizures since he was 18 years of age.  There is been a history of noncompliance.  There is questionably drug use.  However, this was from previous records and patient is currently denying any drug use.    He reports he has been taking his medications as instructed.  However his parents are questioning whether he has possibly been missing doses because he is running out of his medications.  He has no follow-up with neurology until January.  Currently the patient feels back to baseline.    He reports that he fell and struck his head when he had his most recent seizure today but he has no headache.  He has no change in vision.  He has had no nausea vomiting      History provided by:  Patient and parent  Seizure - Prior Hx Of  Seizure activity on arrival: no    Seizure type:  Grand mal  Episode characteristics: disorientation and generalized shaking    Episode characteristics: no confusion    Postictal symptoms: confusion    Return to baseline: yes    Severity:  Severe  Timing:  Intermittent  Progression:  Resolved      Prior to Admission Medications   Prescriptions Last Dose Informant Patient Reported? Taking?   levETIRAcetam (KEPPRA) 500 mg tablet   No No   Sig: Take 1 tablet (500 mg total) by mouth every 12 (twelve) hours   levETIRAcetam (KEPPRA) 500 mg tablet   No Yes   Sig: Take 1 tablet (500 mg total) by mouth every 12 (twelve) hours   zonisamide (ZONEGRAN) 100 mg capsule   No No   Sig: Take 3 capsules (300 mg total) by mouth daily Do not start before September 20, 2023.   zonisamide (ZONEGRAN) 100 mg capsule   No Yes   Sig: Take 3 capsules (300 mg total) by mouth daily   zonisamide (ZONEGRAN) 100 mg capsule   No Yes   Sig: Take 3 capsules (300 mg total) by mouth  daily      Facility-Administered Medications: None       Past Medical History:   Diagnosis Date    Seizure (HCC)        History reviewed. No pertinent surgical history.    History reviewed. No pertinent family history.  I have reviewed and agree with the history as documented.    E-Cigarette/Vaping     E-Cigarette/Vaping Substances     Social History     Tobacco Use    Smoking status: Every Day     Current packs/day: 1.00     Average packs/day: 1 pack/day for 5.0 years (5.0 ttl pk-yrs)     Types: Cigarettes    Smokeless tobacco: Never   Substance Use Topics    Alcohol use: Yes     Comment: sociall    Drug use: Not Currently     Comment: per mother pt has history of meth amphetamine abuse       Review of Systems   Eyes:  Negative for photophobia, pain, redness and visual disturbance.   Cardiovascular:  Negative for chest pain and palpitations.   Gastrointestinal:  Negative for diarrhea, nausea and vomiting.   Neurological:  Positive for seizures and syncope. Negative for dizziness, speech difficulty, light-headedness and headaches.   All other systems reviewed and are negative.      Physical Exam  Physical Exam  Vitals and nursing note reviewed.   Constitutional:       Appearance: Normal appearance. He is well-developed. He is not ill-appearing or toxic-appearing.   HENT:      Head: Normocephalic and atraumatic. Hair is normal.      Jaw: No pain on movement.      Right Ear: External ear normal.      Left Ear: External ear normal.      Nose: Nose normal. No congestion.      Mouth/Throat:      Mouth: Mucous membranes are moist.   Eyes:      General: Lids are normal. No scleral icterus.     Extraocular Movements: Extraocular movements intact.      Conjunctiva/sclera: Conjunctivae normal.      Pupils: Pupils are equal, round, and reactive to light.   Cardiovascular:      Rate and Rhythm: Normal rate and regular rhythm.      Heart sounds: Normal heart sounds. No murmur heard.  Pulmonary:      Effort: Pulmonary effort is  normal. No respiratory distress.      Breath sounds: Normal breath sounds. No decreased breath sounds, wheezing, rhonchi or rales.   Abdominal:      General: Abdomen is flat. There is no distension.      Palpations: Abdomen is soft. Abdomen is not rigid.      Tenderness: There is no abdominal tenderness. There is no guarding or rebound.   Musculoskeletal:         General: No swelling, tenderness, deformity or signs of injury. Normal range of motion.      Cervical back: Normal range of motion and neck supple.   Skin:     General: Skin is warm and dry.      Coloration: Skin is not pale.      Findings: No rash.   Neurological:      General: No focal deficit present.      Mental Status: He is alert and oriented to person, place, and time. Mental status is at baseline.      Coordination: Coordination normal.      Gait: Gait (Ambulates well.) normal.   Psychiatric:         Attention and Perception: Attention normal.         Mood and Affect: Mood normal.         Speech: Speech normal.         Behavior: Behavior normal.         Thought Content: Thought content normal.         Vital Signs  ED Triage Vitals [12/20/23 1307]   Temperature Pulse Respirations Blood Pressure SpO2   98.3 °F (36.8 °C) 56 18 145/81 98 %      Temp Source Heart Rate Source Patient Position - Orthostatic VS BP Location FiO2 (%)   Tympanic Monitor Lying Right arm --      Pain Score       No Pain           Vitals:    12/20/23 1307   BP: 145/81   Pulse: 56   Patient Position - Orthostatic VS: Lying         Visual Acuity  Visual Acuity      Flowsheet Row Most Recent Value   L Pupil Size (mm) 3   R Pupil Size (mm) 3            ED Medications  Medications   ondansetron (FOR EMS ONLY) (ZOFRAN) 4 mg/2 mL injection 4 mg (0 mg Does not apply Given to EMS 12/20/23 1316)   levETIRAcetam (KEPPRA) 1,000 mg in sodium chloride 0.9 % 100 mL IVPB (0 mg Intravenous Stopped 12/20/23 1410)       Diagnostic Studies  Results Reviewed       Procedure Component Value Units  Date/Time    Rapid drug screen, urine [[id number]]  (Abnormal) Collected: 12/20/23 1450    Lab Status: Final result Specimen: Urine, Clean Catch Updated: 12/20/23 1518     Amph/Meth UR Positive     Barbiturate Ur Negative     Benzodiazepine Urine Negative     Cocaine Urine Negative     Methadone Urine Negative     Opiate Urine Negative     PCP Ur Negative     THC Urine Positive     Oxycodone Urine Negative    Narrative:      Presumptive report. If requested, specimen will be sent to reference lab for confirmation.  FOR MEDICAL PURPOSES ONLY.   IF CONFIRMATION NEEDED PLEASE CONTACT THE LAB WITHIN 5 DAYS.    Drug Screen Cutoff Levels:  AMPHETAMINE/METHAMPHETAMINES  1000 ng/mL  BARBITURATES     200 ng/mL  BENZODIAZEPINES     200 ng/mL  COCAINE      300 ng/mL  METHADONE      300 ng/mL  OPIATES      300 ng/mL  PHENCYCLIDINE     25 ng/mL  THC       50 ng/mL  OXYCODONE      100 ng/mL    Urine Microscopic [[id number]]  (Normal) Collected: 12/20/23 1450    Lab Status: Final result Specimen: Urine, Clean Catch Updated: 12/20/23 1512     RBC, UA 0-1 /hpf      WBC, UA None Seen /hpf      Epithelial Cells Occasional /hpf      Bacteria, UA None Seen /hpf     UA (URINE) with reflex to Scope [[id number]]  (Abnormal) Collected: 12/20/23 1450    Lab Status: Final result Specimen: Urine, Clean Catch Updated: 12/20/23 1505     Color, UA Yellow     Clarity, UA Clear     Specific Gravity, UA >=1.030     pH, UA 6.5     Leukocytes, UA Negative     Nitrite, UA Negative     Protein, UA Negative mg/dl      Glucose, UA Negative mg/dl      Ketones, UA 15 (1+) mg/dl      Urobilinogen, UA 0.2 E.U./dl      Bilirubin, UA Negative     Occult Blood, UA Trace-Intact    Manual Differential(PHLEBS Do Not Order) [[id number]]  (Abnormal) Collected: 12/20/23 1354    Lab Status: Final result Specimen: Blood from Arm, Left Updated: 12/20/23 1439     Segmented % 80 %      Lymphocytes % 8 %      Monocytes % 9 %      Eosinophils, % 0 %      Basophils % 0 %       Atypical Lymphocytes % 3 %      Absolute Neutrophils 13.07 Thousand/uL      Lymphocytes Absolute 1.80 Thousand/uL      Monocytes Absolute 1.47 Thousand/uL      Eosinophils Absolute 0.00 Thousand/uL      Basophils Absolute 0.00 Thousand/uL      Total Counted --     Toxic Vacuolated Neutrophils Present     Platelet Estimate Adequate     Large Platelet Present    Basic metabolic panel [[id number]] Collected: 12/20/23 1354    Lab Status: Final result Specimen: Blood from Arm, Left Updated: 12/20/23 1417     Sodium 137 mmol/L      Potassium 3.8 mmol/L      Chloride 107 mmol/L      CO2 24 mmol/L      ANION GAP 6 mmol/L      BUN 15 mg/dL      Creatinine 0.79 mg/dL      Glucose 104 mg/dL      Calcium 9.4 mg/dL      eGFR 128 ml/min/1.73sq m     Narrative:      National Kidney Disease Foundation guidelines for Chronic Kidney Disease (CKD):     Stage 1 with normal or high GFR (GFR > 90 mL/min/1.73 square meters)    Stage 2 Mild CKD (GFR = 60-89 mL/min/1.73 square meters)    Stage 3A Moderate CKD (GFR = 45-59 mL/min/1.73 square meters)    Stage 3B Moderate CKD (GFR = 30-44 mL/min/1.73 square meters)    Stage 4 Severe CKD (GFR = 15-29 mL/min/1.73 square meters)    Stage 5 End Stage CKD (GFR <15 mL/min/1.73 square meters)  Note: GFR calculation is accurate only with a steady state creatinine    CBC and differential [[id number]]  (Abnormal) Collected: 12/20/23 1354    Lab Status: Final result Specimen: Blood from Arm, Left Updated: 12/20/23 1403     WBC 16.34 Thousand/uL      RBC 4.85 Million/uL      Hemoglobin 14.7 g/dL      Hematocrit 44.1 %      MCV 91 fL      MCH 30.3 pg      MCHC 33.3 g/dL      RDW 11.9 %      MPV 10.7 fL      Platelets 216 Thousands/uL     Zonisamide level [[id number]] Collected: 12/20/23 1354    Lab Status: In process Specimen: Blood from Arm, Left Updated: 12/20/23 1357    Levetiracetam level [[id number]] Collected: 12/20/23 1354    Lab Status: In process Specimen: Blood from Arm, Left Updated: 12/20/23 5397                    No orders to display              Procedures  Procedures         ED Course  ED Course as of 12/20/23 1534   Wed Dec 20, 2023   1415 WBC(!): 16.34  Suspect acute phase reactant in a patient with 2 seizures today.   1436 I personally made the patient an appointment for 8 AM tomorrow morning with neurology at [facility].                               SBIRT 22yo+      Flowsheet Row Most Recent Value   Initial Alcohol Screen: US AUDIT-C     1. How often do you have a drink containing alcohol? 0 Filed at: 12/20/2023 1307   2. How many drinks containing alcohol do you have on a typical day you are drinking?  0 Filed at: 12/20/2023 1307   3a. Male UNDER 65: How often do you have five or more drinks on one occasion? 0 Filed at: 12/20/2023 1307   3b. FEMALE Any Age, or MALE 65+: How often do you have 4 or more drinks on one occassion? 0 Filed at: 12/20/2023 1307   Audit-C Score 0 Filed at: 12/20/2023 1307   DANIELE: How many times in the past year have you...    Used an illegal drug or used a prescription medication for non-medical reasons? Never Filed at: 12/20/2023 1307                      Medical Decision Making  Patient presented to the emergency department and a MSE was performed. The patient was evaluated for complaint related to acute seizure.  Patient is potentially at risk for, but not limited to, idiopathic cause, vasovagal, carotid sinus syndrome, volume depletion, diabetes, dysrhythmia, breakthrough seizure, medical noncompliance, drug use. Several of these diagnoses have been evaluated and ruled out by history and physical.  As needed, patient will be further evaluated with laboratory and imaging studies.  Higher level diagnostics, such as CT imaging or ultrasound, may also be required.  Please see work-up portion of the note for further evaluation of patient's risk.  Socioeconomic factors were also considered as part of the decision-making process.  Unless otherwise stated in the chart or  patient is admitted as elsewhere documented, any previously prescribed medications will be maintained.      Problems Addressed:  Breakthrough seizure (HCC): acute illness or injury with systemic symptoms that poses a threat to life or bodily functions    Amount and/or Complexity of Data Reviewed  Labs: ordered. Decision-making details documented in ED Course.    Risk  Prescription drug management.             Disposition  Final diagnoses:   Breakthrough seizure (HCC)     Time reflects when diagnosis was documented in both MDM as applicable and the Disposition within this note       Time User Action Codes Description Comment    12/20/2023  2:23 PM BonsteveteGily Add [R56.9] Seizure (HCC)     12/20/2023  2:23 PM Bonfante Fadi Remove [R56.9] Seizure (HCC)     12/20/2023  2:23 PM BonsteveteFadi Add [G40.919] Breakthrough seizure (HCC)     12/20/2023  2:25 PM Bonfante, Fadi Add [R56.9] Seizures (HCC)           ED Disposition       ED Disposition   Discharge    Condition   Stable    Date/Time   Wed Dec 20, 2023 1441    Comment   Tim Alanis Jr. discharge to home/self care.                   Follow-up Information       Follow up With Specialties Details Why Contact Info Additional Information    [facility]  Appointment is for tomorrow morning at 8 AM.  Please plan to arrive at least 15 minutes prior [address] [facility] [address]    [place] entrance, go towards D elevators.  Neurology is on the first floor near those elevators.    [phone]     [facility]   [address]  [address] [facility] [address] [phone] [facility], [address] [place], [place] [address], 2nd Floor, [place], [address] [phone]            Discharge Medication List as of 12/20/2023  2:41 PM        CONTINUE these medications which have CHANGED    Details   levETIRAcetam (KEPPRA) 500 mg tablet  Take 1 tablet (500 mg total) by mouth every 12 (twelve) hours, Starting Wed 12/20/2023, Normal      zonisamide (ZONEGRAN) 100 mg capsule Take 3 capsules (300 mg total) by mouth daily, Starting Wed 12/20/2023, Until Fri 1/19/2024, Normal                 PDMP Review       None            ED Provider  Electronically Signed by             Fadi Chandler, DO  12/20/23 6840

## 2023-12-20 NOTE — DISCHARGE INSTRUCTIONS
We have obtained you an appointment for 8 AM tomorrow morning with neurology.  You will need to arrive at the [place] office about 15 minutes prior to that appointment.

## 2023-12-23 LAB — LEVETIRACETAM SERPL-MCNC: <2 UG/ML (ref 10–40)

## 2023-12-27 LAB — ZONISAMIDE SERPL-MCNC: <2 UG/ML (ref 10–40)

## 2024-02-13 ENCOUNTER — HOSPITAL ENCOUNTER (OUTPATIENT)
Facility: HOSPITAL | Age: 24
Setting detail: OBSERVATION
Discharge: HOME | End: 2024-02-14
Attending: EMERGENCY MEDICINE | Admitting: INTERNAL MEDICINE
Payer: COMMERCIAL

## 2024-02-13 ENCOUNTER — APPOINTMENT (EMERGENCY)
Dept: CT IMAGING | Facility: HOSPITAL | Age: 24
End: 2024-02-13
Payer: COMMERCIAL

## 2024-02-13 DIAGNOSIS — R56.9 SEIZURES (HCC): ICD-10-CM

## 2024-02-13 DIAGNOSIS — G40.919 BREAKTHROUGH SEIZURE (HCC): Primary | ICD-10-CM

## 2024-02-13 LAB
ALBUMIN SERPL BCP-MCNC: 5 G/DL (ref 3.5–5)
ALP SERPL-CCNC: 72 U/L (ref 34–104)
ALT SERPL W P-5'-P-CCNC: 14 U/L (ref 7–52)
ANION GAP SERPL CALCULATED.3IONS-SCNC: 7 MMOL/L
AST SERPL W P-5'-P-CCNC: 21 U/L (ref 13–39)
ATRIAL RATE: 60 BPM
BASOPHILS # BLD AUTO: 0.06 THOUSANDS/ÂΜL (ref 0–0.1)
BASOPHILS NFR BLD AUTO: 0 % (ref 0–1)
BILIRUB SERPL-MCNC: 0.39 MG/DL (ref 0.2–1)
BUN SERPL-MCNC: 14 MG/DL (ref 5–25)
CALCIUM SERPL-MCNC: 9.1 MG/DL (ref 8.4–10.2)
CALCIUM SERPL-MCNC: 9.5 MG/DL (ref 8.4–10.2)
CHLORIDE SERPL-SCNC: 108 MMOL/L (ref 96–108)
CK SERPL-CCNC: 201 U/L (ref 39–308)
CO2 SERPL-SCNC: 22 MMOL/L (ref 21–32)
CREAT SERPL-MCNC: 0.86 MG/DL (ref 0.6–1.3)
EOSINOPHIL # BLD AUTO: 0.01 THOUSAND/ÂΜL (ref 0–0.61)
EOSINOPHIL NFR BLD AUTO: 0 % (ref 0–6)
ERYTHROCYTE [DISTWIDTH] IN BLOOD BY AUTOMATED COUNT: 12.2 % (ref 11.6–15.1)
FLUAV RNA RESP QL NAA+PROBE: NEGATIVE
FLUBV RNA RESP QL NAA+PROBE: NEGATIVE
GFR SERPL CREATININE-BSD FRML MDRD: 123 ML/MIN/1.73SQ M
GLUCOSE SERPL-MCNC: 123 MG/DL (ref 65–140)
GLUCOSE SERPL-MCNC: 96 MG/DL (ref 65–140)
HCT VFR BLD AUTO: 46.7 % (ref 36.5–49.3)
HGB BLD-MCNC: 16 G/DL (ref 12–17)
IMM GRANULOCYTES # BLD AUTO: 0.17 THOUSAND/UL (ref 0–0.2)
IMM GRANULOCYTES NFR BLD AUTO: 1 % (ref 0–2)
LYMPHOCYTES # BLD AUTO: 1.11 THOUSANDS/ÂΜL (ref 0.6–4.47)
LYMPHOCYTES NFR BLD AUTO: 4 % (ref 14–44)
MCH RBC QN AUTO: 30.6 PG (ref 26.8–34.3)
MCHC RBC AUTO-ENTMCNC: 34.3 G/DL (ref 31.4–37.4)
MCV RBC AUTO: 89 FL (ref 82–98)
MONOCYTES # BLD AUTO: 1.99 THOUSAND/ÂΜL (ref 0.17–1.22)
MONOCYTES NFR BLD AUTO: 8 % (ref 4–12)
NEUTROPHILS # BLD AUTO: 22.26 THOUSANDS/ÂΜL (ref 1.85–7.62)
NEUTS SEG NFR BLD AUTO: 87 % (ref 43–75)
NRBC BLD AUTO-RTO: 0 /100 WBCS
P AXIS: 42 DEGREES
PLATELET # BLD AUTO: 262 THOUSANDS/UL (ref 149–390)
PMV BLD AUTO: 10.9 FL (ref 8.9–12.7)
POTASSIUM SERPL-SCNC: 4.5 MMOL/L (ref 3.5–5.3)
PR INTERVAL: 166 MS
PROT SERPL-MCNC: 7.4 G/DL (ref 6.4–8.4)
QRS AXIS: 84 DEGREES
QRSD INTERVAL: 88 MS
QT INTERVAL: 414 MS
QTC INTERVAL: 414 MS
RBC # BLD AUTO: 5.23 MILLION/UL (ref 3.88–5.62)
RSV RNA RESP QL NAA+PROBE: NEGATIVE
SARS-COV-2 RNA RESP QL NAA+PROBE: NEGATIVE
SODIUM SERPL-SCNC: 137 MMOL/L (ref 135–147)
T WAVE AXIS: 54 DEGREES
VENTRICULAR RATE: 60 BPM
WBC # BLD AUTO: 25.6 THOUSAND/UL (ref 4.31–10.16)

## 2024-02-13 PROCEDURE — G1004 CDSM NDSC: HCPCS

## 2024-02-13 PROCEDURE — 85025 COMPLETE CBC W/AUTO DIFF WBC: CPT | Performed by: EMERGENCY MEDICINE

## 2024-02-13 PROCEDURE — 36415 COLL VENOUS BLD VENIPUNCTURE: CPT | Performed by: EMERGENCY MEDICINE

## 2024-02-13 PROCEDURE — 82948 REAGENT STRIP/BLOOD GLUCOSE: CPT

## 2024-02-13 PROCEDURE — 82550 ASSAY OF CK (CPK): CPT | Performed by: EMERGENCY MEDICINE

## 2024-02-13 PROCEDURE — 99285 EMERGENCY DEPT VISIT HI MDM: CPT | Performed by: EMERGENCY MEDICINE

## 2024-02-13 PROCEDURE — 70450 CT HEAD/BRAIN W/O DYE: CPT

## 2024-02-13 PROCEDURE — 80053 COMPREHEN METABOLIC PANEL: CPT | Performed by: EMERGENCY MEDICINE

## 2024-02-13 PROCEDURE — 96361 HYDRATE IV INFUSION ADD-ON: CPT

## 2024-02-13 PROCEDURE — 93010 ELECTROCARDIOGRAM REPORT: CPT | Performed by: STUDENT IN AN ORGANIZED HEALTH CARE EDUCATION/TRAINING PROGRAM

## 2024-02-13 PROCEDURE — 93005 ELECTROCARDIOGRAM TRACING: CPT

## 2024-02-13 PROCEDURE — 0241U HB NFCT DS VIR RESP RNA 4 TRGT: CPT | Performed by: EMERGENCY MEDICINE

## 2024-02-13 PROCEDURE — 96374 THER/PROPH/DIAG INJ IV PUSH: CPT

## 2024-02-13 PROCEDURE — 87040 BLOOD CULTURE FOR BACTERIA: CPT | Performed by: EMERGENCY MEDICINE

## 2024-02-13 PROCEDURE — 99221 1ST HOSP IP/OBS SF/LOW 40: CPT | Performed by: INTERNAL MEDICINE

## 2024-02-13 PROCEDURE — 99285 EMERGENCY DEPT VISIT HI MDM: CPT

## 2024-02-13 RX ORDER — ONDANSETRON 2 MG/ML
1 INJECTION INTRAMUSCULAR; INTRAVENOUS ONCE
Status: COMPLETED | OUTPATIENT
Start: 2024-02-13 | End: 2024-02-13

## 2024-02-13 RX ORDER — LEVETIRACETAM 500 MG/1
500 TABLET ORAL EVERY 12 HOURS SCHEDULED
Status: DISCONTINUED | OUTPATIENT
Start: 2024-02-14 | End: 2024-02-14

## 2024-02-13 RX ORDER — ENOXAPARIN SODIUM 100 MG/ML
40 INJECTION SUBCUTANEOUS DAILY
Status: DISCONTINUED | OUTPATIENT
Start: 2024-02-14 | End: 2024-02-14 | Stop reason: HOSPADM

## 2024-02-13 RX ORDER — LEVETIRACETAM 500 MG/5ML
1000 INJECTION, SOLUTION, CONCENTRATE INTRAVENOUS ONCE
Status: COMPLETED | OUTPATIENT
Start: 2024-02-13 | End: 2024-02-13

## 2024-02-13 RX ORDER — ZONISAMIDE 100 MG/1
300 CAPSULE ORAL DAILY
Status: DISCONTINUED | OUTPATIENT
Start: 2024-02-14 | End: 2024-02-14 | Stop reason: HOSPADM

## 2024-02-13 RX ADMIN — LEVETIRACETAM 1000 MG: 100 INJECTION, SOLUTION INTRAVENOUS at 20:46

## 2024-02-13 RX ADMIN — SODIUM CHLORIDE 1000 ML: 0.9 INJECTION, SOLUTION INTRAVENOUS at 20:30

## 2024-02-14 ENCOUNTER — APPOINTMENT (OUTPATIENT)
Dept: RADIOLOGY | Facility: HOSPITAL | Age: 24
End: 2024-02-14
Payer: COMMERCIAL

## 2024-02-14 VITALS
BODY MASS INDEX: 26.19 KG/M2 | HEIGHT: 72 IN | WEIGHT: 193.34 LBS | HEART RATE: 82 BPM | DIASTOLIC BLOOD PRESSURE: 60 MMHG | TEMPERATURE: 98.1 F | RESPIRATION RATE: 18 BRPM | OXYGEN SATURATION: 96 % | SYSTOLIC BLOOD PRESSURE: 125 MMHG

## 2024-02-14 LAB
ALBUMIN SERPL BCP-MCNC: 4.4 G/DL (ref 3.5–5)
ALP SERPL-CCNC: 64 U/L (ref 34–104)
ALT SERPL W P-5'-P-CCNC: 11 U/L (ref 7–52)
AMPHETAMINES SERPL QL SCN: NEGATIVE
ANION GAP SERPL CALCULATED.3IONS-SCNC: 6 MMOL/L
AST SERPL W P-5'-P-CCNC: 14 U/L (ref 13–39)
BARBITURATES UR QL: NEGATIVE
BASOPHILS # BLD AUTO: 0.04 THOUSANDS/ÂΜL (ref 0–0.1)
BASOPHILS NFR BLD AUTO: 0 % (ref 0–1)
BENZODIAZ UR QL: NEGATIVE
BILIRUB SERPL-MCNC: 0.51 MG/DL (ref 0.2–1)
BUN SERPL-MCNC: 12 MG/DL (ref 5–25)
CALCIUM SERPL-MCNC: 9.5 MG/DL (ref 8.4–10.2)
CHLORIDE SERPL-SCNC: 108 MMOL/L (ref 96–108)
CO2 SERPL-SCNC: 24 MMOL/L (ref 21–32)
COCAINE UR QL: NEGATIVE
CREAT SERPL-MCNC: 0.9 MG/DL (ref 0.6–1.3)
EOSINOPHIL # BLD AUTO: 0.07 THOUSAND/ÂΜL (ref 0–0.61)
EOSINOPHIL NFR BLD AUTO: 0 % (ref 0–6)
ERYTHROCYTE [DISTWIDTH] IN BLOOD BY AUTOMATED COUNT: 12.2 % (ref 11.6–15.1)
GFR SERPL CREATININE-BSD FRML MDRD: 121 ML/MIN/1.73SQ M
GLUCOSE P FAST SERPL-MCNC: 93 MG/DL (ref 65–99)
GLUCOSE SERPL-MCNC: 93 MG/DL (ref 65–140)
HCT VFR BLD AUTO: 42.5 % (ref 36.5–49.3)
HGB BLD-MCNC: 14.2 G/DL (ref 12–17)
IMM GRANULOCYTES # BLD AUTO: 0.1 THOUSAND/UL (ref 0–0.2)
IMM GRANULOCYTES NFR BLD AUTO: 1 % (ref 0–2)
LYMPHOCYTES # BLD AUTO: 2.04 THOUSANDS/ÂΜL (ref 0.6–4.47)
LYMPHOCYTES NFR BLD AUTO: 12 % (ref 14–44)
MCH RBC QN AUTO: 30 PG (ref 26.8–34.3)
MCHC RBC AUTO-ENTMCNC: 33.4 G/DL (ref 31.4–37.4)
MCV RBC AUTO: 90 FL (ref 82–98)
METHADONE UR QL: NEGATIVE
MONOCYTES # BLD AUTO: 1.94 THOUSAND/ÂΜL (ref 0.17–1.22)
MONOCYTES NFR BLD AUTO: 11 % (ref 4–12)
NEUTROPHILS # BLD AUTO: 13.33 THOUSANDS/ÂΜL (ref 1.85–7.62)
NEUTS SEG NFR BLD AUTO: 76 % (ref 43–75)
NRBC BLD AUTO-RTO: 0 /100 WBCS
OPIATES UR QL SCN: NEGATIVE
OXYCODONE+OXYMORPHONE UR QL SCN: NEGATIVE
PCP UR QL: NEGATIVE
PLATELET # BLD AUTO: 239 THOUSANDS/UL (ref 149–390)
PLATELET # BLD AUTO: 257 THOUSANDS/UL (ref 149–390)
PMV BLD AUTO: 10.9 FL (ref 8.9–12.7)
PMV BLD AUTO: 11 FL (ref 8.9–12.7)
POTASSIUM SERPL-SCNC: 3.7 MMOL/L (ref 3.5–5.3)
PROCALCITONIN SERPL-MCNC: 0.11 NG/ML
PROT SERPL-MCNC: 6.7 G/DL (ref 6.4–8.4)
RBC # BLD AUTO: 4.74 MILLION/UL (ref 3.88–5.62)
SODIUM SERPL-SCNC: 138 MMOL/L (ref 135–147)
THC UR QL: POSITIVE
WBC # BLD AUTO: 17.52 THOUSAND/UL (ref 4.31–10.16)

## 2024-02-14 PROCEDURE — 80053 COMPREHEN METABOLIC PANEL: CPT | Performed by: INTERNAL MEDICINE

## 2024-02-14 PROCEDURE — 85025 COMPLETE CBC W/AUTO DIFF WBC: CPT | Performed by: INTERNAL MEDICINE

## 2024-02-14 PROCEDURE — 84145 PROCALCITONIN (PCT): CPT | Performed by: INTERNAL MEDICINE

## 2024-02-14 PROCEDURE — 71045 X-RAY EXAM CHEST 1 VIEW: CPT

## 2024-02-14 PROCEDURE — 80203 DRUG SCREEN QUANT ZONISAMIDE: CPT | Performed by: INTERNAL MEDICINE

## 2024-02-14 PROCEDURE — 85049 AUTOMATED PLATELET COUNT: CPT | Performed by: INTERNAL MEDICINE

## 2024-02-14 PROCEDURE — 99239 HOSP IP/OBS DSCHRG MGMT >30: CPT | Performed by: INTERNAL MEDICINE

## 2024-02-14 PROCEDURE — 80307 DRUG TEST PRSMV CHEM ANLYZR: CPT | Performed by: INTERNAL MEDICINE

## 2024-02-14 PROCEDURE — 80177 DRUG SCRN QUAN LEVETIRACETAM: CPT | Performed by: INTERNAL MEDICINE

## 2024-02-14 RX ORDER — LEVETIRACETAM 500 MG/5ML
1000 INJECTION, SOLUTION, CONCENTRATE INTRAVENOUS ONCE
Status: COMPLETED | OUTPATIENT
Start: 2024-02-14 | End: 2024-02-14

## 2024-02-14 RX ORDER — LEVETIRACETAM 500 MG/1
1000 TABLET ORAL EVERY 12 HOURS SCHEDULED
Status: DISCONTINUED | OUTPATIENT
Start: 2024-02-14 | End: 2024-02-14 | Stop reason: HOSPADM

## 2024-02-14 RX ORDER — LEVETIRACETAM 500 MG/1
1000 TABLET ORAL EVERY 12 HOURS SCHEDULED
Qty: 60 TABLET | Refills: 0 | Status: SHIPPED | OUTPATIENT
Start: 2024-02-14

## 2024-02-14 RX ORDER — ZONISAMIDE 100 MG/1
300 CAPSULE ORAL DAILY
Qty: 90 CAPSULE | Refills: 0 | Status: SHIPPED | OUTPATIENT
Start: 2024-02-14 | End: 2024-03-15

## 2024-02-14 RX ADMIN — ZONISAMIDE 300 MG: 100 CAPSULE ORAL at 08:14

## 2024-02-14 RX ADMIN — LEVETIRACETAM 500 MG: 500 TABLET, FILM COATED ORAL at 08:14

## 2024-02-14 RX ADMIN — LEVETIRACETAM 1000 MG: 100 INJECTION, SOLUTION INTRAVENOUS at 14:30

## 2024-02-14 RX ADMIN — ENOXAPARIN SODIUM 40 MG: 40 INJECTION SUBCUTANEOUS at 08:14

## 2024-02-14 NOTE — ASSESSMENT & PLAN NOTE
Etiology of this is likely due to either noncompliance with medications or possible substance use disorder.  The patient in the past has been found to be positive for marijuana and amphetamine.  Urine drug screen is underway.  Patient is currently postictal and cannot be communicated further.  Neurology will be consulted in case they have any further input.

## 2024-02-14 NOTE — CASE MANAGEMENT
Case Management Discharge Planning Note    Patient name Tim Alanis Jr.  Location /-01 MRN [id number]  : 2000 Date 2024       Current Admission Date: 2024  Current Admission Diagnosis:Breakthrough seizure (HCC)   Patient Active Problem List    Diagnosis Date Noted    Breakthrough seizure (HCC) 2024    Epileptic seizure, generalized (HCC) 2023    Lactic acidosis 2023    Leukocytosis 2023    Drug use 2023      LOS (days): 0  Geometric Mean LOS (GMLOS) (days):   Days to GMLOS:     OBJECTIVE:            Current admission status: Observation   Preferred Pharmacy:   [id number] - [place] - [address]  [address]  [place] [address]  Phone: [phone] Fax: [phone]    [id number]  [place] - [address] [facility]  [address] [facility] [address]  Phone: [phone] Fax: [phone]    Primary Care Provider: No primary care provider on file.    Primary Insurance: RONN PARKER PENDING  Secondary Insurance:     DISCHARGE DETAILS:                 As per request from attending, ANNY arranging follow up Neurology appointment for patient with [facility] , this is a video visit, Tues, 2024 at 1040, AVS updated

## 2024-02-14 NOTE — ASSESSMENT & PLAN NOTE
Present  on admission likely reactive in the setting of underlying breakthrough seizures PTA.  Chest x-ray negative for any infiltrate.  Patient denies any urinary symptoms.  Leukocytosis trending down.  Remains afebrile and does not meet any other sirs/sepsis criteria.  Will continue to monitor off antibiotics.  proCalcitonin negative.

## 2024-02-14 NOTE — ASSESSMENT & PLAN NOTE
Presented with multiple episodes of witnessed seizures at home prior to admission.  Reports taking his medications Keppra 500 twice daily however his dose is scheduled to be 1000 mg daily.  Also reported compliant with zonisamide.  Admits to using marijuana daily.  Urine drug screen positive for THC but negative for other illicit drugs.  CT head on admission was unremarkable for acute abnormality.  Patient awake alert oriented with normal neuroexam and no focal deficits.  Did with Keppra in the emergency room.  Evaluated by neurology and recommended to increase dose to 1000 mg twice daily.  Keppra and zonisamide level sent which are pending at the time of discharge.  Patient does not drive.  No DMV form was sent.  Appointment obtained with [facility] neurology for 2/20/24 for  virtual visit as patient has not followed up with his primary neurologist for a while.

## 2024-02-14 NOTE — DISCHARGE SUMMARY
[facility]  Discharge- Tim Alanis  2000, 23 y.o. male MRN: [id number]  Unit/Bed#: -Cassie Encounter: [id number]  Primary Care Provider: No primary care provider on file.   Date and time admitted to hospital: 2/13/2024  7:29 PM    * Breakthrough seizure (HCC)  Assessment & Plan  Presented with multiple episodes of witnessed seizures at home prior to admission.  Reports taking his medications Keppra 500 twice daily however his dose is scheduled to be 1000 mg daily.  Also reported compliant with zonisamide.  Admits to using marijuana daily.  Urine drug screen positive for THC but negative for other illicit drugs.  CT head on admission was unremarkable for acute abnormality.  Patient awake alert oriented with normal neuroexam and no focal deficits.  Did with Keppra in the emergency room.  Evaluated by neurology and recommended to increase dose to 1000 mg twice daily.  Keppra and zonisamide level sent which are pending at the time of discharge.  Patient does not drive.  No DMV form was sent.  Appointment obtained with [facility] neurology for 2/20/24 for  virtual visit as patient has not followed up with his primary neurologist for a while.    Drug use  Assessment & Plan  Drug screen positive for THC.  Patient admits to daily use.  Counseled regarding cessation.    Leukocytosis  Assessment & Plan  Present  on admission likely reactive in the setting of underlying breakthrough seizures PTA.  Chest x-ray negative for any infiltrate.  Patient denies any urinary symptoms.  Leukocytosis trending down.  Remains afebrile and does not meet any other sirs/sepsis criteria.  Will continue to monitor off antibiotics.  proCalcitonin negative.        Medical Problems       Resolved Problems  Date Reviewed: 2/13/2024   None       Discharging Physician / Practitioner: Darlene Crowe MD  PCP: No primary care provider on file.  Admission Date:   Admission Orders (From admission, onward)        Ordered        02/13/24 2239  Place in Observation  Once                          Discharge Date: 02/14/24    Consultations During Hospital Stay:  Neurology    Procedures Performed:   CT head negative for any acute intracranial abnormality.    Significant Findings / Test Results:   Leukocytosis.    Incidental Findings:   NA       Test Results Pending at Discharge (will require follow up):   Keppra level.  Zonisamide level.     Outpatient Tests Requested:  Neurology follow-up on 2/20.    Complications:  None     Reason for Admission: Seizure    Hospital Course:   Tim Alanis Jr. is a 23 y.o. male patient who originally presented to the hospital on 2/13/2024 due to witnessed seizure episode.  Was postictal subsequently.  Admitted to the hospital.  Was taking Keppra 500 twice daily although recent neurology visit said that his dose needed to be increased to 1 g twice daily.  Reports compliant with Keppra and zonisamide.  Denied any recent sleep disturbance or febrile illness.  Does admit to using marijuana daily but denies any other drug use.  Urine drug screen positive for THC.  CT head on admission was unremarkable.  Had leukocytosis on admission but it has been the case in the past as well.  Procalcitonin was negative.  Chest x-ray unremarkable.  Seen by neurology and recommended to increase Keppra to 1 g twice daily.  Continue with current dose of zonisamide.  Patient has an appointment with outpatient neurologist for 2/20.  Stable for discharge home today.        Please see above list of diagnoses and related plan for additional information.     Condition at Discharge: stable    Discharge Day Visit / Exam:   Subjective: Seen and examined at bedside.  Denies any headache, blurring of vision, numbness or weakness of any extremity.  No further seizure-like episode.  Vitals: Blood Pressure: 125/60 (02/14/24 0730)  Pulse: 82 (02/14/24 0730)  Temperature: 98.1 °F (36.7 °C) (02/14/24 0730)  Temp Source: Oral (02/13/24  2023)  Respirations: 18 (02/14/24 0730)  Height: 6' (182.9 cm) (02/14/24 0006)  Weight - Scale: 87.7 kg (193 lb 5.5 oz) (02/14/24 0006)  SpO2: 96 % (02/14/24 0814)  Exam:   Physical Exam  Constitutional:       General: He is not in acute distress.  HENT:      Head: Normocephalic.      Nose: Nose normal.      Mouth/Throat:      Mouth: Mucous membranes are moist.   Eyes:      Extraocular Movements: Extraocular movements intact.      Pupils: Pupils are equal, round, and reactive to light.   Cardiovascular:      Rate and Rhythm: Normal rate and regular rhythm.   Pulmonary:      Effort: Pulmonary effort is normal.   Abdominal:      General: Abdomen is flat.      Palpations: Abdomen is soft.   Musculoskeletal:      Cervical back: Neck supple.      Right lower leg: No edema.      Left lower leg: No edema.   Neurological:      General: No focal deficit present.      Mental Status: He is alert and oriented to person, place, and time. Mental status is at baseline.          Discussion with Family: Attempted to update  (mother) via phone. Unable to contact.    Discharge instructions/Information to patient and family:   See after visit summary for information provided to patient and family.      Provisions for Follow-Up Care:  See after visit summary for information related to follow-up care and any pertinent home health orders.      Mobility at time of Discharge:   Basic Mobility Inpatient Raw Score: 24  JH-HLM Goal: 8: Walk 250 feet or more  JH-HLM Achieved: 8: Walk 250 feet ot more  HLM Goal achieved. Continue to encourage appropriate mobility.     Disposition:   Home    Planned Readmission: No     Discharge Statement:  I spent 35 minutes discharging the patient. This time was spent on the day of discharge. I had direct contact with the patient on the day of discharge. Greater than 50% of the total time was spent examining patient, answering all patient questions, arranging and discussing plan of care with  patient as well as directly providing post-discharge instructions.  Additional time then spent on discharge activities.    Discharge Medications:  See after visit summary for reconciled discharge medications provided to patient and/or family.      **Please Note: This note may have been constructed using a voice recognition system**

## 2024-02-14 NOTE — PLAN OF CARE
Problem: PAIN - ADULT  Goal: Verbalizes/displays adequate comfort level or baseline comfort level  Description: Interventions:  - Encourage patient to monitor pain and request assistance  - Assess pain using appropriate pain scale  - Administer analgesics based on type and severity of pain and evaluate response  - Implement non-pharmacological measures as appropriate and evaluate response  - Consider cultural and social influences on pain and pain management  - Notify physician/advanced practitioner if interventions unsuccessful or patient reports new pain  Outcome: Progressing     Problem: INFECTION - ADULT  Goal: Absence or prevention of progression during hospitalization  Description: INTERVENTIONS:  - Assess and monitor for signs and symptoms of infection  - Monitor lab/diagnostic results  - Monitor all insertion sites, i.e. indwelling lines, tubes, and drains  - Monitor endotracheal if appropriate and nasal secretions for changes in amount and color  - [place] appropriate cooling/warming therapies per order  - Administer medications as ordered  - Instruct and encourage patient and family to use good hand hygiene technique  - Identify and instruct in appropriate isolation precautions for identified infection/condition  Outcome: Progressing  Goal: Absence of fever/infection during neutropenic period  Description: INTERVENTIONS:  - Monitor WBC    Outcome: Progressing     Problem: DISCHARGE PLANNING  Goal: Discharge to home or other facility with appropriate resources  Description: INTERVENTIONS:  - Identify barriers to discharge w/patient and caregiver  - Arrange for needed discharge resources and transportation as appropriate  - Identify discharge learning needs (meds, wound care, etc.)  - Arrange for interpretive services to assist at discharge as needed  - Refer to Case Management Department for coordinating discharge planning if the patient needs post-hospital services based on physician/advanced  practitioner order or complex needs related to functional status, cognitive ability, or social support system  Outcome: Progressing     Problem: Knowledge Deficit  Goal: Patient/family/caregiver demonstrates understanding of disease process, treatment plan, medications, and discharge instructions  Description: Complete learning assessment and assess knowledge base.  Interventions:  - Provide teaching at level of understanding  - Provide teaching via preferred learning methods  Outcome: Progressing

## 2024-02-14 NOTE — PLAN OF CARE
Problem: PAIN - ADULT  Goal: Verbalizes/displays adequate comfort level or baseline comfort level  Description: Interventions:  - Encourage patient to monitor pain and request assistance  - Assess pain using appropriate pain scale  - Administer analgesics based on type and severity of pain and evaluate response  - Implement non-pharmacological measures as appropriate and evaluate response  - Consider cultural and social influences on pain and pain management  - Notify physician/advanced practitioner if interventions unsuccessful or patient reports new pain  Outcome: Progressing     Problem: INFECTION - ADULT  Goal: Absence or prevention of progression during hospitalization  Description: INTERVENTIONS:  - Assess and monitor for signs and symptoms of infection  - Monitor lab/diagnostic results  - Monitor all insertion sites, i.e. indwelling lines, tubes, and drains  - Monitor endotracheal if appropriate and nasal secretions for changes in amount and color  - [place] appropriate cooling/warming therapies per order  - Administer medications as ordered  - Instruct and encourage patient and family to use good hand hygiene technique  - Identify and instruct in appropriate isolation precautions for identified infection/condition  Outcome: Progressing  Goal: Absence of fever/infection during neutropenic period  Description: INTERVENTIONS:  - Monitor WBC    Outcome: Progressing     Problem: SAFETY ADULT  Goal: Patient will remain free of falls  Description: INTERVENTIONS:  - Educate patient/family on patient safety including physical limitations  - Instruct patient to call for assistance with activity   - Consult OT/PT to assist with strengthening/mobility   - Keep Call bell within reach  - Keep bed low and locked with side rails adjusted as appropriate  - Keep care items and personal belongings within reach  - Initiate and maintain comfort rounds  - Make Fall Risk Sign visible to staff  - Offer Toileting every 2 Hours,  in advance of need  - Initiate/Maintain bed and chair alarm  - Obtain necessary fall risk management equipment: yellow socks and bractlet   - Apply yellow socks and bracelet for high fall risk patients  - Consider moving patient to room near nurses station  Outcome: Progressing  Goal: Maintain or return to baseline ADL function  Description: INTERVENTIONS:  -  Assess patient's ability to carry out ADLs; assess patient's baseline for ADL function and identify physical deficits which impact ability to perform ADLs (bathing, care of mouth/teeth, toileting, grooming, dressing, etc.)  - Assess/evaluate cause of self-care deficits   - Assess range of motion  - Assess patient's mobility; develop plan if impaired  - Assess patient's need for assistive devices and provide as appropriate  - Encourage maximum independence but intervene and supervise when necessary  - Involve family in performance of ADLs  - Assess for home care needs following discharge   - Consider OT consult to assist with ADL evaluation and planning for discharge  - Provide patient education as appropriate  Outcome: Progressing  Goal: Maintains/Returns to pre admission functional level  Description: INTERVENTIONS:  - Perform AM-PAC 6 Click Basic Mobility/ Daily Activity assessment daily.  - Set and communicate daily mobility goal to care team and patient/family/caregiver.   - Collaborate with rehabilitation services on mobility goals if consulted  - Perform Range of Motion 3 times a day.  - Reposition patient every 2 hours.  - Dangle patient 2 times a day  - Stand patient 3 times a day  - Ambulate patient 3 times a day  - Out of bed to chair 3 times a day   - Out of bed for meals 3 times a day  - Out of bed for toileting  - Record patient progress and toleration of activity level   Outcome: Progressing     Problem: DISCHARGE PLANNING  Goal: Discharge to home or other facility with appropriate resources  Description: INTERVENTIONS:  - Identify barriers to  discharge w/patient and caregiver  - Arrange for needed discharge resources and transportation as appropriate  - Identify discharge learning needs (meds, wound care, etc.)  - Arrange for interpretive services to assist at discharge as needed  - Refer to Case Management Department for coordinating discharge planning if the patient needs post-hospital services based on physician/advanced practitioner order or complex needs related to functional status, cognitive ability, or social support system  Outcome: Progressing     Problem: Knowledge Deficit  Goal: Patient/family/caregiver demonstrates understanding of disease process, treatment plan, medications, and discharge instructions  Description: Complete learning assessment and assess knowledge base.  Interventions:  - Provide teaching at level of understanding  - Provide teaching via preferred learning methods  Outcome: Progressing

## 2024-02-14 NOTE — CONSULTS
TeleConsultation - Neurology   Tim Alanis Jr. 23 y.o. male MRN: [id number]  Unit/Bed#: -01 Encounter: [id number]        REQUIRED DOCUMENTATION:     1. This service was provided via Telemedicine.  2. Provider located at [place].  3. TeleMed provider: Zane Fonseca MD.  4. Identify all parties in room with patient during tele consult:  5.Patient was then informed that this was a Telemedicine visit and that the exam was being conducted confidentially over secure lines. My office door was closed. No one else was in the room.  Patient acknowledged consent and understanding of privacy and security of the Telemedicine visit, and gave us permission to have the assistant stay in the room in order to assist with the history and to conduct the exam.  I informed the patient that I have reviewed their record in Epic and presented the opportunity for them to ask any questions regarding the visit today.  The patient agreed to participate.             Assessment/Plan     23 y.o. male with past medical history including known seizure disorder, substance use,, who was admitted after 3 breakthrough seizure episodes.  Patient was supposed to be on Keppra and zonisamide.  His Keppra was increased from 500 mg twice daily to 1 g twice daily since his last office visit in 12/2023.  However, patient reports he still on Keppra 500 twice daily.  Patient neurological examination was nonfocal.  Reported his back to baseline.  CT head no acute abnormality.  Of note, per chart review, patient does have history of medication noncompliance.  Patient was never being evaluated by [facility] neurology in the past.  He was evaluated by neurologist in [facility] in the past.  Per documentation, he also have appointment with neurologist in [facility].    Impression  Breakthrough seizure  -Recommend primary team to load the patient with Keppra 1 g now and continue with 1 g twice daily.  He will need to continue  taking zonisamide 300 mg daily for now.  -Will defer to the primary team to rule out infection.  Significant leukocytosis noticed upon admission,  Trending down.  -Discussed with patient regarding the importance of keeping his outpatient appointment.  The primary team has patient scheduled with a follow-up visit in [facility].  -Ideally, will recommend MRI brain with and without on the epilepsy protocol, routine EEG during hospitalization.  Patient prefers to go home.  Therefore, we will recommend to get the studies done at outpatient.    Please call neurology for any other question.      Seizure Precautions:  1) Avoid sleep loss  2) Avoid open bodies of water or open flame  3) Avoid situations where loss of consciousness may predispose to injury  4) Avoid swimming alone  5) Take showers, not baths  6.) Do not drive for 6 months following a spell/seizure per [place] state laws     Tim Alanis Jr. will follow-up with neurology in [facility] on 2/20    History of Present Illness     Reason for Consult / Principal Problem: Seizure  Hx and PE limited by: Telemedicine  HPI: Tim Alanis Jr. is a 23 y.o. male with past medical history including seizure disorder, substance use,, who was admitted after 3 breakthrough seizure episodes.  Patient reported he had no call about what happened to.  Per record, patient had 3 tonic-clonic seizure-like activity and was then brought to ED by EMS.  In ED, patient was loaded with Keppra 1 g.  No further seizure episode since admission.  Per chart review, patient was evaluated by neurologists in [facility] and [facility].  He was last seen by Dr. Tirso Crow in [facility].  He was supposed be on Keppra 1000 mg twice daily and zonisamide 300 mg daily.  However, according to patient, he continues to take Keppra 500 mg twice daily and zonisamide 300 mg daily.  Per chart review, patient was noticed to have history of medication noncompliance.    Today patient was seen and examined.  According to  patient, he is back to himself.  Patient reported his seizure disorder started at the age of 18.  Reported semiology for generalized tonic-clonic shaking.  Reported no recall of his seizure spell.  His neurological examination was nonfocal.  He denies headache, and he reported no recall    CT head, no acute abnormality.    Inpatient consult to Neurology  Consult performed by: Zane Fonseca MD  Consult ordered by: Juancarlos Ramirez MD           Review of Systems  10 system reviewed, unremarkable other than above.  Historical Information   Past Medical History:   Diagnosis Date    Seizure (HCC)      History reviewed. No pertinent surgical history.  Social History   Social History     Substance and Sexual Activity   Alcohol Use Yes    Comment: sociall     Social History     Substance and Sexual Activity   Drug Use Not Currently    Comment: per mother pt has history of meth amphetamine abuse     E-Cigarette/Vaping     E-Cigarette/Vaping Substances     Social History     Tobacco Use   Smoking Status Every Day    Current packs/day: 1.00    Average packs/day: 1 pack/day for 5.0 years (5.0 ttl pk-yrs)    Types: Cigarettes   Smokeless Tobacco Never     Family History: non-contributory    Review of previous medical records was  completed.     Meds/Allergies   all current active meds have been reviewed    No Known Allergies    Objective   Vitals:Blood pressure 125/60, pulse 82, temperature 98.1 °F (36.7 °C), resp. rate 18, height 6' (1.829 m), weight 87.7 kg (193 lb 5.5 oz), SpO2 92%.,Body mass index is 26.22 kg/m².    Intake/Output Summary (Last 24 hours) at 2/14/2024 0942  Last data filed at 2/14/2024 0900  Gross per 24 hour   Intake 1120 ml   Output --   Net 1120 ml       Invasive Devices:   Invasive Devices       Peripheral Intravenous Line  Duration             Peripheral IV 02/13/24 Left;Ventral (anterior) Forearm <1 day                    Physical Exam  Neurologic Exam    Lab Results: CBC:   Results from last 7 days   Lab  Units 02/14/24  0922 02/14/24  0531 02/13/24 2025   WBC Thousand/uL 17.52*  --  25.60*   RBC Million/uL 4.74  --  5.23   HEMOGLOBIN g/dL 14.2  --  16.0   HEMATOCRIT % 42.5  --  46.7   MCV fL 90  --  89   PLATELETS Thousands/uL 239 257 262   , BMP/CMP:   Results from last 7 days   Lab Units 02/14/24  0922 02/13/24 2126 02/13/24 2025   SODIUM mmol/L 138  --  137   POTASSIUM mmol/L 3.7  --  4.5   CHLORIDE mmol/L 108  --  108   CO2 mmol/L 24  --  22   BUN mg/dL 12  --  14   CREATININE mg/dL 0.90  --  0.86   CALCIUM mg/dL 9.5 9.1 9.5   AST U/L 14  --  21   ALT U/L 11  --  14   ALK PHOS U/L 64  --  72   EGFR ml/min/1.73sq m 121  --  123     Imaging Studies: I have personally reviewed pertinent films in PACS  EKG, Pathology, and Other Studies: I have personally reviewed pertinent reports.    VTE Prophylaxis: Heparin      Counseling / Coordination of Care  Total time spent today 51 minutes. Greater than 50% of total time was spent with the patient and / or family counseling and / or coordination of care. A description of the counseling / coordination of care: Impression, further study, chart review follow-up discussion

## 2024-02-14 NOTE — ED PROVIDER NOTES
History  Chief Complaint   Patient presents with    Seizure - Prior Hx Of     Hx seizurea. Today had seizures x3 . Last one was 1820.      This is a 23-year-old male presenting to the ED with a history of seizure disorder.  Patient is to medications Keppra and Zonegran for his seizure and states that he is compliant with his medications.  Patient was witnessed by his family to have 3 grand mal seizures today about an hour duration between each episode.        Prior to Admission Medications   Prescriptions Last Dose Informant Patient Reported? Taking?   levETIRAcetam (KEPPRA) 500 mg tablet   No No   Sig: Take 1 tablet (500 mg total) by mouth every 12 (twelve) hours   zonisamide (ZONEGRAN) 100 mg capsule   No No   Sig: Take 3 capsules (300 mg total) by mouth daily      Facility-Administered Medications: None       Past Medical History:   Diagnosis Date    Seizure (HCC)        History reviewed. No pertinent surgical history.    History reviewed. No pertinent family history.  I have reviewed and agree with the history as documented.    E-Cigarette/Vaping     E-Cigarette/Vaping Substances     Social History     Tobacco Use    Smoking status: Every Day     Current packs/day: 1.00     Average packs/day: 1 pack/day for 5.0 years (5.0 ttl pk-yrs)     Types: Cigarettes    Smokeless tobacco: Never   Substance Use Topics    Alcohol use: Yes     Comment: sociall    Drug use: Not Currently     Comment: per mother pt has history of meth amphetamine abuse       Review of Systems   Constitutional:  Negative for chills and fever.   HENT:  Negative for ear pain and sore throat.    Eyes:  Negative for pain and visual disturbance.   Respiratory:  Negative for cough and shortness of breath.    Cardiovascular:  Negative for chest pain and palpitations.   Gastrointestinal:  Negative for abdominal pain and vomiting.   Genitourinary:  Negative for dysuria and hematuria.   Musculoskeletal:  Negative for arthralgias and back pain.   Skin:   Negative for color change and rash.   Neurological:  Positive for seizures. Negative for syncope.   All other systems reviewed and are negative.      Physical Exam  Physical Exam  Vitals and nursing note reviewed.   Constitutional:       General: He is not in acute distress.     Appearance: He is well-developed. He is not ill-appearing.      Comments: Lethargic, post ictal   HENT:      Head: Normocephalic and atraumatic.      Right Ear: External ear normal.      Left Ear: External ear normal.      Nose: Nose normal.      Mouth/Throat:      Mouth: Mucous membranes are moist.      Pharynx: No oropharyngeal exudate or posterior oropharyngeal erythema.   Eyes:      General:         Right eye: No discharge.         Left eye: No discharge.      Extraocular Movements: Extraocular movements intact.      Conjunctiva/sclera: Conjunctivae normal.   Neck:      Vascular: No carotid bruit.   Cardiovascular:      Rate and Rhythm: Normal rate and regular rhythm.      Pulses: Normal pulses.      Heart sounds: Normal heart sounds. No murmur heard.  Pulmonary:      Effort: Pulmonary effort is normal. No respiratory distress.      Breath sounds: Normal breath sounds. No stridor. No wheezing, rhonchi or rales.   Chest:      Chest wall: No tenderness.   Abdominal:      General: There is no distension.      Palpations: Abdomen is soft. There is no mass.      Tenderness: There is no abdominal tenderness. There is no guarding or rebound.      Hernia: No hernia is present.   Musculoskeletal:         General: No swelling, tenderness, deformity or signs of injury. Normal range of motion.      Cervical back: Normal range of motion and neck supple. No rigidity or tenderness.      Right lower leg: No edema.      Left lower leg: No edema.   Lymphadenopathy:      Cervical: No cervical adenopathy.   Skin:     General: Skin is warm and dry.      Capillary Refill: Capillary refill takes less than 2 seconds.      Coloration: Skin is not jaundiced or  pale.      Findings: No bruising, erythema or lesion.   Neurological:      Cranial Nerves: No cranial nerve deficit.      Sensory: No sensory deficit.      Motor: No weakness.      Coordination: Coordination normal.      Gait: Gait normal.      Deep Tendon Reflexes: Reflexes normal.      Comments: Post ictal   Psychiatric:         Mood and Affect: Mood normal.         Vital Signs  ED Triage Vitals   Temperature Pulse Respirations Blood Pressure SpO2   02/13/24 1928 02/13/24 1928 02/13/24 1928 02/13/24 1928 02/13/24 1928   (!) 96 °F (35.6 °C) (!) 53 20 146/86 100 %      Temp Source Heart Rate Source Patient Position - Orthostatic VS BP Location FiO2 (%)   02/13/24 1928 02/13/24 2050 02/13/24 1928 02/13/24 1928 --   Oral Monitor Lying Left arm       Pain Score       02/13/24 1928       No Pain           Vitals:    02/13/24 1945 02/13/24 2023 02/13/24 2050 02/13/24 2200   BP: 105/62 110/64 113/72 108/77   Pulse: (!) 52 80 62 61   Patient Position - Orthostatic VS:   Lying Lying         Visual Acuity  Visual Acuity      Flowsheet Row Most Recent Value   L Pupil Size (mm) 6   R Pupil Size (mm) 6   L Pupil Shape Round   R Pupil Shape Round            ED Medications  Medications   ondansetron (FOR EMS ONLY) (ZOFRAN) 4 mg/2 mL injection 4 mg (0 mg Does not apply Given to EMS 2/13/24 1925)   sodium chloride 0.9 % bolus 1,000 mL (1,000 mL Intravenous New Bag 2/13/24 2030)   levETIRAcetam (KEPPRA) injection 1,000 mg (1,000 mg Intravenous Given 2/13/24 2046)       Diagnostic Studies  Results Reviewed       Procedure Component Value Units Date/Time    Rapid drug screen, urine [[id number]]     Lab Status: No result Specimen: Urine     COVID/FLU/RSV [[id number]]  (Normal) Collected: 02/13/24 2126    Lab Status: Final result Specimen: Nares from Nose Updated: 02/13/24 2222     SARS-CoV-2 Negative     INFLUENZA A PCR Negative     INFLUENZA B PCR Negative     RSV PCR Negative    Narrative:      FOR PEDIATRIC PATIENTS - copy/paste COVID  Guidelines URL to browser: [email or web address]    SARS-CoV-2 assay is a Nucleic Acid Amplification assay intended for the  qualitative detection of nucleic acid from SARS-CoV-2 in nasopharyngeal  swabs. Results are for the presumptive identification of SARS-CoV-2 RNA.    Positive results are indicative of infection with SARS-CoV-2, the virus  causing COVID-19, but do not rule out bacterial infection or co-infection  with other viruses. Laboratories within the [place] and its  territories are required to report all positive results to the appropriate  public health authorities. Negative results do not preclude SARS-CoV-2  infection and should not be used as the sole basis for treatment or other  patient management decisions. Negative results must be combined with  clinical observations, patient history, and epidemiological information.  This test has not been FDA cleared or approved.    This test has been authorized by FDA under an Emergency Use Authorization  (EUA). This test is only authorized for the duration of time the  declaration that circumstances exist justifying the authorization of the  emergency use of an in vitro diagnostic tests for detection of SARS-CoV-2  virus and/or diagnosis of COVID-19 infection under section 564(b)(1) of  the Act, 21 U.S.C. 360bbb-3(b)(1), unless the authorization is terminated  or revoked sooner. The test has been validated but independent review by FDA  and CLIA is pending.    Test performed using [employer] GeneXpert: This RT-PCR assay targets N2,  a region unique to SARS-CoV-2. A conserved region in the E-gene was chosen  for pan-Sarbecovirus detection which includes SARS-CoV-2.    According to CMS-2020-01-R, this platform meets the definition of high-throughput technology.    Calcium [[id number]]  (Normal) Collected: 02/13/24 2126    Lab Status: Final result Specimen: Blood from Arm, Right Updated: 02/13/24 2154      Calcium 9.1 mg/dL     Blood culture #2 [[id number]] Collected: 02/13/24 2127    Lab Status: In process Specimen: Blood from Hand, Right Updated: 02/13/24 2135    Blood culture #1 [[id number]] Collected: 02/13/24 2127    Lab Status: In process Specimen: Blood from Arm, Right Updated: 02/13/24 2135    Fingerstick Glucose (POCT) [[id number]]  (Normal) Collected: 02/13/24 2059    Lab Status: Final result Updated: 02/13/24 2101     POC Glucose 96 mg/dl     Comprehensive metabolic panel [[id number]] Collected: 02/13/24 2025    Lab Status: Final result Specimen: Blood from Arm, Left Updated: 02/13/24 2053     Sodium 137 mmol/L      Potassium 4.5 mmol/L      Chloride 108 mmol/L      CO2 22 mmol/L      ANION GAP 7 mmol/L      BUN 14 mg/dL      Creatinine 0.86 mg/dL      Glucose 123 mg/dL      Calcium 9.5 mg/dL      AST 21 U/L      ALT 14 U/L      Alkaline Phosphatase 72 U/L      Total Protein 7.4 g/dL      Albumin 5.0 g/dL      Total Bilirubin 0.39 mg/dL      eGFR 123 ml/min/1.73sq m     Narrative:      National Kidney Disease Foundation guidelines for Chronic Kidney Disease (CKD):     Stage 1 with normal or high GFR (GFR > 90 mL/min/1.73 square meters)    Stage 2 Mild CKD (GFR = 60-89 mL/min/1.73 square meters)    Stage 3A Moderate CKD (GFR = 45-59 mL/min/1.73 square meters)    Stage 3B Moderate CKD (GFR = 30-44 mL/min/1.73 square meters)    Stage 4 Severe CKD (GFR = 15-29 mL/min/1.73 square meters)    Stage 5 End Stage CKD (GFR <15 mL/min/1.73 square meters)  Note: GFR calculation is accurate only with a steady state creatinine    CK [[id number]]  (Normal) Collected: 02/13/24 2025    Lab Status: Final result Specimen: Blood from Arm, Left Updated: 02/13/24 2053     Total  U/L     CBC and differential [[id number]]  (Abnormal) Collected: 02/13/24 2025    Lab Status: Final result Specimen: Blood from Arm, Left Updated: 02/13/24 2033     WBC 25.60 Thousand/uL      RBC 5.23 Million/uL      Hemoglobin 16.0 g/dL      Hematocrit  46.7 %      MCV 89 fL      MCH 30.6 pg      MCHC 34.3 g/dL      RDW 12.2 %      MPV 10.9 fL      Platelets 262 Thousands/uL      nRBC 0 /100 WBCs      Neutrophils Relative 87 %      Immat GRANS % 1 %      Lymphocytes Relative 4 %      Monocytes Relative 8 %      Eosinophils Relative 0 %      Basophils Relative 0 %      Neutrophils Absolute 22.26 Thousands/µL      Immature Grans Absolute 0.17 Thousand/uL      Lymphocytes Absolute 1.11 Thousands/µL      Monocytes Absolute 1.99 Thousand/µL      Eosinophils Absolute 0.01 Thousand/µL      Basophils Absolute 0.06 Thousands/µL                    CT head without contrast   Final Result by Arsenio Velázquez MD (02/13 2127)      No acute intracranial abnormality.                  Workstation performed: [id number]                    Procedures  Procedures         ED Course  ED Course as of 02/13/24 2239 Tue Feb 13, 2024 2237 Patient was hypothermic on arrival to the ED and continues to be postictal.  Viral swab is negative however WBC is 25.6 which can be attributed to seizures.  Blood cultures drawn and patient's care was discussed with the hospitalist who accepts the patient for placement on observation.                               SBIRT 20yo+      Flowsheet Row Most Recent Value   Initial Alcohol Screen: US AUDIT-C     1. How often do you have a drink containing alcohol? 1 Filed at: 02/13/2024 1943   2. How many drinks containing alcohol do you have on a typical day you are drinking?  0 Filed at: 02/13/2024 1943   3a. Male UNDER 65: How often do you have five or more drinks on one occasion? 0 Filed at: 02/13/2024 1943   Audit-C Score 1 Filed at: 02/13/2024 1943   DANIELE: How many times in the past year have you...    Used an illegal drug or used a prescription medication for non-medical reasons? Daily or Almost Daily  [Marijuana] Filed at: 02/13/2024 1943   DAST-10: In the past 12 months...    1. Have you used drugs other than those required for medical reasons? 1 Filed at:  "02/13/2024 1943   2. Do you use more than one drug at a time? 0 Filed at: 02/13/2024 1943   3. Have you had medical problems as a result of your drug use (e.g., memory loss, hepatitis, convulsions, bleeding, etc.)? 0 Filed at: 02/13/2024 1943   4. Have you had \"blackouts\" or \"flashbacks\" as a result of drug use?YesNo 0 Filed at: 02/13/2024 1943   5. Do you ever feel bad or guilty about your drug use? 0 Filed at: 02/13/2024 1943   6. Does your spouse (or parent) ever complain about your involvement with drugs? 0 Filed at: 02/13/2024 1943   7. Have you neglected your family because of your use of drugs? 0 Filed at: 02/13/2024 1943   8. Have you engaged in illegal activities in order to obtain drugs? 0 Filed at: 02/13/2024 1943   9. Have you ever experienced withdrawal symptoms (felt sick) when you stopped taking drugs? 0 Filed at: 02/13/2024 1943   10. Are you always able to stop using drugs when you want to? 0 Filed at: 02/13/2024 1943   DAST-10 Score 1 Filed at: 02/13/2024 1943                      Medical Decision Making  This is a 23-year-old male presenting to the ED for evaluation of multiple seizures.  He does have an established seizure disorder on medication.  Differential diagnosis includes but not limited to: Status epilepticus, breakthrough seizure, electrolyte abnormality, dehydration, noncompliance    Amount and/or Complexity of Data Reviewed  Labs: ordered.  Radiology: ordered.    Risk  Prescription drug management.             Disposition  Final diagnoses:   Breakthrough seizure (HCC)     Time reflects when diagnosis was documented in both MDM as applicable and the Disposition within this note       Time User Action Codes Description Comment    2/13/2024 10:38 PM Serina Montiel Add [G40.919] Breakthrough seizure (HCC)           ED Disposition       ED Disposition   Admit    Condition   Stable    Date/Time   Tue Feb 13, 2024 2238    Comment                  Follow-up Information    None   "       Patient's Medications   Discharge Prescriptions    No medications on file       No discharge procedures on file.    PDMP Review       None            ED Provider  Electronically Signed by             Serina Montiel DO  02/13/24 8828

## 2024-02-14 NOTE — H&P
[facility]  H&P  Name: Tim Alanis Jr. 23 y.o. male I MRN: [id number]  Unit/Bed#: Z1 H11 I Date of Admission: 2/13/2024   Date of Service: 2/13/2024 I Hospital Day: 0      Assessment/Plan   * Breakthrough seizure (HCC)  Assessment & Plan  Etiology of this is likely due to either noncompliance with medications or possible substance use disorder.  The patient in the past has been found to be positive for marijuana and amphetamine.  Urine drug screen is underway.  Patient is currently postictal and cannot be communicated further.  Neurology will be consulted in case they have any further input.             VTE Prophylaxis: Enoxaparin (Lovenox)  / reason for no mechanical VTE prophylaxis not required    Code Status: Full code  POLST: There is no POLST form on file for this patient (pre-hospital)  Discussion with family: Family members were at bedside    Anticipated Length of Stay:  Patient will be admitted on an Observation basis with an anticipated length of stay of less than 2 midnights.   Justification for Hospital Stay:     Total Time for Visit, including Counseling / Coordination of Care: 20 minutes.  Greater than 50% of this total time spent on direct patient counseling and coordination of care.    Chief Complaint:   Seizure episodes at home    History of Present Illness:    Tim Alanis Jr. is a 23 y.o. male who presents with prior history of seizure disorder and substance use disorder, patient was at home, one of the family members at the bedside stated that patient woke up with a seizure-like activity which apparently was tonic-clonic as reported, this is first episod last aboute 5 minutes, within 1 hour patient developed another episode which was about 15 minutes and the third episode which resulted in patient becoming relatively unresponsive, patient was then brought to the hospital by paramedics.  CT of the head was unremarkable, no other electrolyte abnormality was found,  urine drug screen is underway.  Patient will be placed in observation to safely go through the postictal phase and to be seen by neurology for further recommendation tomorrow..    Review of Systems:    Review of Systems   Reason unable to perform ROS: Postictal after seizure.       Past Medical and Surgical History:     Past Medical History:   Diagnosis Date    Seizure (HCC)        History reviewed. No pertinent surgical history.    Meds/Allergies:    Prior to Admission medications    Medication Sig Start Date End Date Taking? Authorizing Provider   levETIRAcetam (KEPPRA) 500 mg tablet Take 1 tablet (500 mg total) by mouth every 12 (twelve) hours 12/20/23   Fadi Chandler DO   zonisamide (ZONEGRAN) 100 mg capsule Take 3 capsules (300 mg total) by mouth daily 12/20/23 1/19/24  Fadi Chandler DO     I have reviewed home medications with patient personally.    Allergies: No Known Allergies    Social History:     Marital Status: Single   Occupation: Not applicable  Patient Pre-hospital Living Situation: Home  Patient Pre-hospital Level of Mobility: Ambulatory  Patient Pre-hospital Diet Restrictions: None  Substance Use History:   Social History     Substance and Sexual Activity   Alcohol Use Yes    Comment: sociall     Social History     Tobacco Use   Smoking Status Every Day    Current packs/day: 1.00    Average packs/day: 1 pack/day for 5.0 years (5.0 ttl pk-yrs)    Types: Cigarettes   Smokeless Tobacco Never     Social History     Substance and Sexual Activity   Drug Use Not Currently    Comment: per mother pt has history of meth amphetamine abuse       Family History:    non-contributory      Vitals:   Blood Pressure: 108/77 (02/13/24 2200)  Pulse: 61 (02/13/24 2200)  Temperature: 97.5 °F (36.4 °C) (02/13/24 2023)  Temp Source: Oral (02/13/24 2023)  Respirations: 17 (02/13/24 2200)  Weight - Scale: 88.5 kg (195 lb) (02/13/24 1928)  SpO2: 99 % (02/13/24 2200)    Physical Exam    Physical Exam  Vitals and nursing  note reviewed.   Neurological:      General: Opens his eyes to verbal stimuli, does not show any particular neurodeficits.     Mental Status: Cannot evaluate properly.    Additional Data:     Lab Results: I have personally reviewed pertinent reports.      Results from last 7 days   Lab Units 02/13/24 2025   WBC Thousand/uL 25.60*   HEMOGLOBIN g/dL 16.0   HEMATOCRIT % 46.7   PLATELETS Thousands/uL 262   NEUTROS PCT % 87*   LYMPHS PCT % 4*   MONOS PCT % 8   EOS PCT % 0     Results from last 7 days   Lab Units 02/13/24 2126 02/13/24 2025   SODIUM mmol/L  --  137   POTASSIUM mmol/L  --  4.5   CHLORIDE mmol/L  --  108   CO2 mmol/L  --  22   BUN mg/dL  --  14   CREATININE mg/dL  --  0.86   ANION GAP mmol/L  --  7   CALCIUM mg/dL 9.1 9.5   ALBUMIN g/dL  --  5.0   TOTAL BILIRUBIN mg/dL  --  0.39   ALK PHOS U/L  --  72   ALT U/L  --  14   AST U/L  --  21   GLUCOSE RANDOM mg/dL  --  123         Results from last 7 days   Lab Units 02/13/24 2059   POC GLUCOSE mg/dl 96               Imaging: I have personally reviewed pertinent reports.      CT head without contrast   Final Result by Arsenio Velázquez MD (02/13 2127)      No acute intracranial abnormality.                  Workstation performed: [id number]             EKG, Pathology, and Other Studies Reviewed on Admission:   EKG: CT of the head report was reviewed    Allscripts / [facility] Records Reviewed: Yes     ** Please Note: This note has been constructed using a voice recognition system. **

## 2024-02-14 NOTE — UTILIZATION REVIEW
Initial Clinical Review    Admission: Date/Time/Statement:   Admission Orders (From admission, onward)       Ordered        02/13/24 2239  Place in Observation  Once                          Orders Placed This Encounter   Procedures    Place in Observation     Standing Status:   Standing     Number of Occurrences:   1     Order Specific Question:   Level of Care     Answer:   Med Surg [16]     ED Arrival Information       Expected   -    Arrival   2/13/2024 19:24    Acuity   Urgent              Means of arrival   Ambulance    Escorted by   [facility]    Service   Hospitalist    Admission type   Emergency              Arrival complaint   Seizure             Chief Complaint   Patient presents with    Seizure - Prior Hx Of     Hx seizurea. Today had seizures x3 . Last one was 1820.        Initial Presentation: 23 y.o. male to ED via EMS from home   Present to ED with seizure-like activity. Per family member patient woke up with a seizure-like activity which apparently was tonic-clonic as reported, this is first episod last aboute 5 minutes, within 1 hour patient developed another episode which was about 15 minutes and the third episode which resulted in patient becoming relatively unresponsive, patient was then brought to the hospital by paramedics.   PMHX seizure disorder and substance use disorder   Admitted to OBS with DX: Breakthrough seizure   on exam: hypothermic; Lethargic, post ictal ; Opens his eyes to verbal stimuli, does not show any particular neurodeficits.   PLAN: cont warming blanket; seizure precautions; neuro consult        ED Triage Vitals   Temperature Pulse Respirations Blood Pressure SpO2   02/13/24 1928 02/13/24 1928 02/13/24 1928 02/13/24 1928 02/13/24 1928   (!) 96 °F (35.6 °C) (!) 53 20 146/86 100 %      Temp Source Heart Rate Source Patient Position - Orthostatic VS BP Location FiO2 (%)   02/13/24 1928 02/13/24 2050 02/13/24 1928 02/13/24 1928 --   Oral Monitor Lying Left arm        Pain Score       02/13/24 1928       No Pain          Wt Readings from Last 1 Encounters:   02/14/24 87.7 kg (193 lb 5.5 oz)     Additional Vital Signs:   Date/Time Temp Pulse Resp BP MAP (mmHg) SpO2 O2 Device Patient Position - Orthostatic VS   02/14/24 07:30:21 98.1 °F (36.7 °C) 82 18 125/60 82 92 % -- --   02/14/24 00:15:03 98.1 °F (36.7 °C) 81 -- 111/55 74 95 % None (Room air) --   02/13/24 23:35:31 98.2 °F (36.8 °C) 58 17 98/82 87 96 % -- --   02/13/24 2200 -- 61 17 108/77 -- 99 % None (Room air) Lying   02/13/24 2050 -- 62 19 113/72 78 100 % None (Room air) Lying   02/13/24 2023 97.5 °F (36.4 °C) 80 18 110/64 -- 100 % None (Room air) --   02/13/24 1945 96 °F (35.6 °C) Abnormal  52 Abnormal  20 105/62 -- 100 % None (Room air) --   02/13/24 1928 96 °F (35.6 °C) Abnormal  53 Abnormal  20 146/86 -- 100 % None (Room air) Lying       EKG: Normal sinus rhythm with sinus arrhythmia  Normal ECG  When compared with ECG of 28-NOV-2023 11:13,  No significant change was found    Pertinent Labs/Diagnostic Test Results:   CT head without contrast   Final Result by Arsenio Velázquez MD (02/13 2127)      No acute intracranial abnormality.                  Workstation performed: [id number]           Results from last 7 days   Lab Units 02/13/24 2126   SARS-COV-2  Negative     Results from last 7 days   Lab Units 02/14/24 0922 02/14/24 0531 02/13/24 2025   WBC Thousand/uL 17.52*  --  25.60*   HEMOGLOBIN g/dL 14.2  --  16.0   HEMATOCRIT % 42.5  --  46.7   PLATELETS Thousands/uL 239 257 262   NEUTROS ABS Thousands/µL 13.33*  --  22.26*        Results from last 7 days   Lab Units 02/14/24 0922 02/13/24 2126 02/13/24  2025   SODIUM mmol/L 138  --  137   POTASSIUM mmol/L 3.7  --  4.5   CHLORIDE mmol/L 108  --  108   CO2 mmol/L 24  --  22   ANION GAP mmol/L 6  --  7   BUN mg/dL 12  --  14   CREATININE mg/dL 0.90  --  0.86   EGFR ml/min/1.73sq m 121  --  123   CALCIUM mg/dL 9.5 9.1 9.5     Results from last 7 days   Lab Units  02/14/24 0922 02/13/24 2025   AST U/L 14 21   ALT U/L 11 14   ALK PHOS U/L 64 72   TOTAL PROTEIN g/dL 6.7 7.4   ALBUMIN g/dL 4.4 5.0   TOTAL BILIRUBIN mg/dL 0.51 0.39     Results from last 7 days   Lab Units 02/13/24  2059   POC GLUCOSE mg/dl 96     Results from last 7 days   Lab Units 02/14/24 0922 02/13/24 2025   GLUCOSE RANDOM mg/dL 93 123        Results from last 7 days   Lab Units 02/13/24 2025   CK TOTAL U/L 201      Results from last 7 days   Lab Units 02/14/24 0922   PROCALCITONIN ng/ml 0.11      Results from last 7 days   Lab Units 02/13/24 2126   INFLUENZA A PCR  Negative   INFLUENZA B PCR  Negative   RSV PCR  Negative        Results from last 7 days   Lab Units 02/14/24  0531   AMPH/METH  Negative   BARBITURATE UR  Negative   BENZODIAZEPINE UR  Negative   COCAINE UR  Negative   METHADONE URINE  Negative   OPIATE UR  Negative   PCP UR  Negative   THC UR  Positive*        Results from last 7 days   Lab Units 02/13/24 2127   BLOOD CULTURE  Received in Microbiology Lab. Culture in Progress.  Received in Microbiology Lab. Culture in Progress.        ED Treatment:   Medication Administration from 02/13/2024 1922 to 02/13/2024 2331         Date/Time Order Dose Route Action     02/13/2024 1925 EST ondansetron (FOR EMS ONLY) (ZOFRAN) 4 mg/2 mL injection 4 mg 0 mg Does not apply Given to EMS     02/13/2024 2130 EST sodium chloride 0.9 % bolus 1,000 mL 0 mL Intravenous Stopped     02/13/2024 2030 EST sodium chloride 0.9 % bolus 1,000 mL 1,000 mL Intravenous New Bag     02/13/2024 2046 EST levETIRAcetam (KEPPRA) injection 1,000 mg 1,000 mg Intravenous Given            Admitting Diagnosis: Seizure (HCC) [R56.9]  Breakthrough seizure (HCC) [G40.919]    Age/Sex: 23 y.o. male    Admission Orders: ambulatory; spot pulse ox; seizure precautions; regular diet    Scheduled Medications:  enoxaparin, 40 mg, Subcutaneous, Daily  levETIRAcetam, 500 mg, Oral, Q12H DAYO  zonisamide, 300 mg, Oral, Daily      Continuous IV  Infusions: None       PRN Meds: None       IP CONSULT TO NEUROLOGY    Network Utilization Review Department  ATTENTION: Please call with any questions or concerns to [phone] and carefully listen to the prompts so that you are directed to the right person. All voicemails are confidential.   For Discharge needs, contact Care Management DC Support Team at [phone] opt. 2  Send all requests for admission clinical reviews, approved or denied determinations and any other requests to dedicated fax number below belonging to the [place] where the patient is receiving treatment. List of dedicated fax numbers for the Facilities:  FACILITY NAME UR FAX NUMBER   ADMISSION DENIALS (Administrative/Medical Necessity) [phone]   DISCHARGE SUPPORT TEAM (NETWORK) [phone]   PARENT CHILD HEALTH (Maternity/NICU/Pediatrics) [phone]   [facility] [phone]   [facility] [phone]   [facility] [phone]   [facility] [phone]   [facility] [phone]   [facility] [phone]   General acute hospital [phone]   [facility] [phone]   [facility] [phone]   [facility] [phone]   [facility] [phone]   [facility] [phone]

## 2024-02-16 LAB
LEVETIRACETAM SERPL-MCNC: 11.9 UG/ML (ref 10–40)
ZONISAMIDE SERPL-MCNC: 6 UG/ML (ref 10–40)

## 2024-02-18 LAB
BACTERIA BLD CULT: NORMAL
BACTERIA BLD CULT: NORMAL

## 2024-02-19 LAB
BACTERIA BLD CULT: NORMAL
BACTERIA BLD CULT: NORMAL

## 2024-04-28 ENCOUNTER — APPOINTMENT (EMERGENCY)
Dept: RADIOLOGY | Facility: HOSPITAL | Age: 24
DRG: 053 | End: 2024-04-28
Payer: COMMERCIAL

## 2024-04-28 ENCOUNTER — HOSPITAL ENCOUNTER (INPATIENT)
Facility: HOSPITAL | Age: 24
LOS: 1 days | Discharge: LEFT BEFORE DISCHARGE | DRG: 053 | End: 2024-04-29
Attending: EMERGENCY MEDICINE | Admitting: FAMILY MEDICINE
Payer: COMMERCIAL

## 2024-04-28 DIAGNOSIS — G40.919 BREAKTHROUGH SEIZURE (HCC): Primary | ICD-10-CM

## 2024-04-28 DIAGNOSIS — R56.9 SEIZURES (HCC): ICD-10-CM

## 2024-04-28 LAB
ALBUMIN SERPL BCP-MCNC: 4.8 G/DL (ref 3.5–5)
ALP SERPL-CCNC: 73 U/L (ref 34–104)
ALT SERPL W P-5'-P-CCNC: 11 U/L (ref 7–52)
ANION GAP SERPL CALCULATED.3IONS-SCNC: 16 MMOL/L (ref 4–13)
AST SERPL W P-5'-P-CCNC: 16 U/L (ref 13–39)
BASOPHILS # BLD AUTO: 0.07 THOUSANDS/ÂΜL (ref 0–0.1)
BASOPHILS NFR BLD AUTO: 0 % (ref 0–1)
BILIRUB SERPL-MCNC: 0.49 MG/DL (ref 0.2–1)
BUN SERPL-MCNC: 12 MG/DL (ref 5–25)
CALCIUM SERPL-MCNC: 9.4 MG/DL (ref 8.4–10.2)
CHLORIDE SERPL-SCNC: 105 MMOL/L (ref 96–108)
CO2 SERPL-SCNC: 17 MMOL/L (ref 21–32)
CREAT SERPL-MCNC: 0.94 MG/DL (ref 0.6–1.3)
EOSINOPHIL # BLD AUTO: 0.01 THOUSAND/ÂΜL (ref 0–0.61)
EOSINOPHIL NFR BLD AUTO: 0 % (ref 0–6)
ERYTHROCYTE [DISTWIDTH] IN BLOOD BY AUTOMATED COUNT: 12.1 % (ref 11.6–15.1)
GFR SERPL CREATININE-BSD FRML MDRD: 115 ML/MIN/1.73SQ M
GLUCOSE SERPL-MCNC: 177 MG/DL (ref 65–140)
HCT VFR BLD AUTO: 44.5 % (ref 36.5–49.3)
HGB BLD-MCNC: 14.9 G/DL (ref 12–17)
IMM GRANULOCYTES # BLD AUTO: 0.16 THOUSAND/UL (ref 0–0.2)
IMM GRANULOCYTES NFR BLD AUTO: 1 % (ref 0–2)
LYMPHOCYTES # BLD AUTO: 1.63 THOUSANDS/ÂΜL (ref 0.6–4.47)
LYMPHOCYTES NFR BLD AUTO: 7 % (ref 14–44)
MCH RBC QN AUTO: 30.5 PG (ref 26.8–34.3)
MCHC RBC AUTO-ENTMCNC: 33.5 G/DL (ref 31.4–37.4)
MCV RBC AUTO: 91 FL (ref 82–98)
MONOCYTES # BLD AUTO: 2.09 THOUSAND/ÂΜL (ref 0.17–1.22)
MONOCYTES NFR BLD AUTO: 8 % (ref 4–12)
NEUTROPHILS # BLD AUTO: 20.91 THOUSANDS/ÂΜL (ref 1.85–7.62)
NEUTS SEG NFR BLD AUTO: 84 % (ref 43–75)
NRBC BLD AUTO-RTO: 0 /100 WBCS
PLATELET # BLD AUTO: 264 THOUSANDS/UL (ref 149–390)
PMV BLD AUTO: 12 FL (ref 8.9–12.7)
POTASSIUM SERPL-SCNC: 3.3 MMOL/L (ref 3.5–5.3)
PROT SERPL-MCNC: 7.1 G/DL (ref 6.4–8.4)
RBC # BLD AUTO: 4.88 MILLION/UL (ref 3.88–5.62)
SODIUM SERPL-SCNC: 138 MMOL/L (ref 135–147)
WBC # BLD AUTO: 24.87 THOUSAND/UL (ref 4.31–10.16)

## 2024-04-28 PROCEDURE — 83520 IMMUNOASSAY QUANT NOS NONAB: CPT | Performed by: EMERGENCY MEDICINE

## 2024-04-28 PROCEDURE — 85025 COMPLETE CBC W/AUTO DIFF WBC: CPT | Performed by: EMERGENCY MEDICINE

## 2024-04-28 PROCEDURE — 96374 THER/PROPH/DIAG INJ IV PUSH: CPT

## 2024-04-28 PROCEDURE — 80053 COMPREHEN METABOLIC PANEL: CPT | Performed by: EMERGENCY MEDICINE

## 2024-04-28 PROCEDURE — 36415 COLL VENOUS BLD VENIPUNCTURE: CPT | Performed by: EMERGENCY MEDICINE

## 2024-04-28 PROCEDURE — 99223 1ST HOSP IP/OBS HIGH 75: CPT | Performed by: NURSE PRACTITIONER

## 2024-04-28 PROCEDURE — 71045 X-RAY EXAM CHEST 1 VIEW: CPT

## 2024-04-28 PROCEDURE — 99284 EMERGENCY DEPT VISIT MOD MDM: CPT

## 2024-04-28 PROCEDURE — 99285 EMERGENCY DEPT VISIT HI MDM: CPT | Performed by: EMERGENCY MEDICINE

## 2024-04-28 PROCEDURE — 96375 TX/PRO/DX INJ NEW DRUG ADDON: CPT

## 2024-04-28 RX ORDER — LORAZEPAM 2 MG/ML
INJECTION INTRAMUSCULAR
Status: COMPLETED
Start: 2024-04-28 | End: 2024-04-28

## 2024-04-28 RX ORDER — LORAZEPAM 2 MG/ML
2 INJECTION INTRAMUSCULAR ONCE
Status: COMPLETED | OUTPATIENT
Start: 2024-04-28 | End: 2024-04-28

## 2024-04-28 RX ORDER — LEVETIRACETAM 500 MG/5ML
2000 INJECTION, SOLUTION, CONCENTRATE INTRAVENOUS ONCE
Status: COMPLETED | OUTPATIENT
Start: 2024-04-28 | End: 2024-04-28

## 2024-04-28 RX ADMIN — LORAZEPAM 2 MG: 2 INJECTION INTRAMUSCULAR; INTRAVENOUS at 21:13

## 2024-04-28 RX ADMIN — LEVETIRACETAM 2000 MG: 100 INJECTION, SOLUTION INTRAVENOUS at 22:11

## 2024-04-28 RX ADMIN — LORAZEPAM 2 MG: 2 INJECTION INTRAMUSCULAR at 21:13

## 2024-04-28 NOTE — ED PROVIDER NOTES
History  Chief Complaint   Patient presents with    Seizure - Prior Hx Of     Pt hx of seizures on keppra and Lamictal. States compliant with meds. Per ems family witnessed 3 grand mal seizures. Pt states headache 9/10      Patient with history of seizure disorder states he had a seizure earlier today.  Does not recall episode.  States he bit his cheek and had bladder incontinence, similar to prior seizures.  Resolved spontaneously.  No complaints at this time.  States he is scheduled to see his neurologist on May 8 for follow-up.  States he takes Keppra for seizure prophylaxis and has been compliant.      History provided by:  Patient   used: No    Seizure - Prior Hx Of  Seizure activity on arrival: no    Seizure type:  Grand mal  Episode characteristics: incontinence and tongue biting    Postictal symptoms: confusion    Return to baseline: yes    Severity:  Moderate  Timing:  Once  Progression:  Resolved  PTA treatment:  None  History of seizures: yes        Prior to Admission Medications   Prescriptions Last Dose Informant Patient Reported? Taking?   levETIRAcetam (KEPPRA) 500 mg tablet   No No   Sig: Take 2 tablets (1,000 mg total) by mouth every 12 (twelve) hours   zonisamide (ZONEGRAN) 100 mg capsule   No No   Sig: Take 3 capsules (300 mg total) by mouth daily      Facility-Administered Medications: None       Past Medical History:   Diagnosis Date    Seizure (HCC)        History reviewed. No pertinent surgical history.    History reviewed. No pertinent family history.  I have reviewed and agree with the history as documented.    E-Cigarette/Vaping     E-Cigarette/Vaping Substances     Social History     Tobacco Use    Smoking status: Former     Current packs/day: 1.00     Average packs/day: 1 pack/day for 5.0 years (5.0 ttl pk-yrs)     Types: Cigarettes    Smokeless tobacco: Never   Substance Use Topics    Alcohol use: Yes     Comment: sociall    Drug use: Not Currently     Types:  Marijuana     Comment: per mother pt has history of meth amphetamine abuse       Review of Systems   Constitutional:  Negative for chills and fever.   HENT:  Negative for ear pain, hearing loss, sore throat, trouble swallowing and voice change.    Eyes:  Negative for pain and discharge.   Respiratory:  Negative for cough, shortness of breath and wheezing.    Cardiovascular:  Negative for chest pain and palpitations.   Gastrointestinal:  Negative for abdominal pain, blood in stool, constipation, diarrhea, nausea and vomiting.   Genitourinary:  Negative for dysuria, flank pain, frequency and hematuria.   Musculoskeletal:  Negative for joint swelling, neck pain and neck stiffness.   Skin:  Negative for rash and wound.   Neurological:  Negative for dizziness, seizures, syncope, facial asymmetry and headaches.   Psychiatric/Behavioral:  Negative for hallucinations, self-injury and suicidal ideas.    All other systems reviewed and are negative.      Physical Exam  Physical Exam  Vitals and nursing note reviewed.   Constitutional:       General: He is not in acute distress.     Appearance: He is well-developed.   HENT:      Head: Normocephalic and atraumatic.      Right Ear: External ear normal.      Left Ear: External ear normal.   Eyes:      General: No scleral icterus.        Right eye: No discharge.         Left eye: No discharge.      Extraocular Movements: Extraocular movements intact.      Conjunctiva/sclera: Conjunctivae normal.   Cardiovascular:      Rate and Rhythm: Normal rate and regular rhythm.      Heart sounds: Normal heart sounds. No murmur heard.  Pulmonary:      Effort: Pulmonary effort is normal.      Breath sounds: Normal breath sounds. No wheezing or rales.   Abdominal:      General: Bowel sounds are normal. There is no distension.      Palpations: Abdomen is soft.      Tenderness: There is no abdominal tenderness. There is no guarding or rebound.   Musculoskeletal:         General: No deformity.  Normal range of motion.      Cervical back: Normal range of motion and neck supple.   Skin:     General: Skin is warm and dry.      Findings: No rash.   Neurological:      General: No focal deficit present.      Mental Status: He is alert and oriented to person, place, and time.      Cranial Nerves: No cranial nerve deficit.      Motor: No weakness.      Coordination: Coordination normal.   Psychiatric:         Mood and Affect: Mood normal.         Behavior: Behavior normal.         Thought Content: Thought content normal.         Judgment: Judgment normal.         Vital Signs  ED Triage Vitals [04/28/24 1832]   Temperature Pulse Respirations Blood Pressure SpO2   (!) 96.6 °F (35.9 °C) 58 18 130/72 100 %      Temp Source Heart Rate Source Patient Position - Orthostatic VS BP Location FiO2 (%)   Temporal -- -- -- --      Pain Score       9           Vitals:    04/28/24 1900 04/28/24 1930 04/28/24 2030 04/28/24 2115   BP: 138/84 122/75 118/75 157/72   Pulse: 57 59 58 78         Visual Acuity  Visual Acuity      Flowsheet Row Most Recent Value   L Pupil Size (mm) 3   R Pupil Size (mm) 3            ED Medications  Medications   LORazepam (ATIVAN) injection 2 mg (2 mg Intravenous Given by Other 4/28/24 2113)   levETIRAcetam (KEPPRA) injection 2,000 mg (2,000 mg Intravenous Given 4/28/24 2211)       Diagnostic Studies  Results Reviewed       Procedure Component Value Units Date/Time    UA w Reflex to Microscopic w Reflex to Culture [[id number]]     Lab Status: No result Specimen: Urine     Rapid drug screen, urine [[id number]]     Lab Status: No result Specimen: Urine     Comprehensive metabolic panel [[id number]]  (Abnormal) Collected: 04/28/24 1914    Lab Status: Final result Specimen: Blood from Arm, Left Updated: 04/28/24 1943     Sodium 138 mmol/L      Potassium 3.3 mmol/L      Chloride 105 mmol/L      CO2 17 mmol/L      ANION GAP 16 mmol/L      BUN 12 mg/dL      Creatinine 0.94 mg/dL      Glucose 177 mg/dL      Calcium  9.4 mg/dL      AST 16 U/L      ALT 11 U/L      Alkaline Phosphatase 73 U/L      Total Protein 7.1 g/dL      Albumin 4.8 g/dL      Total Bilirubin 0.49 mg/dL      eGFR 115 ml/min/1.73sq m     Narrative:      National Kidney Disease Foundation guidelines for Chronic Kidney Disease (CKD):     Stage 1 with normal or high GFR (GFR > 90 mL/min/1.73 square meters)    Stage 2 Mild CKD (GFR = 60-89 mL/min/1.73 square meters)    Stage 3A Moderate CKD (GFR = 45-59 mL/min/1.73 square meters)    Stage 3B Moderate CKD (GFR = 30-44 mL/min/1.73 square meters)    Stage 4 Severe CKD (GFR = 15-29 mL/min/1.73 square meters)    Stage 5 End Stage CKD (GFR <15 mL/min/1.73 square meters)  Note: GFR calculation is accurate only with a steady state creatinine    CBC and differential [[id number]]  (Abnormal) Collected: 04/28/24 1914    Lab Status: Final result Specimen: Blood from Arm, Left Updated: 04/28/24 1926     WBC 24.87 Thousand/uL      RBC 4.88 Million/uL      Hemoglobin 14.9 g/dL      Hematocrit 44.5 %      MCV 91 fL      MCH 30.5 pg      MCHC 33.5 g/dL      RDW 12.1 %      MPV 12.0 fL      Platelets 264 Thousands/uL      nRBC 0 /100 WBCs      Segmented % 84 %      Immature Grans % 1 %      Lymphocytes % 7 %      Monocytes % 8 %      Eosinophils Relative 0 %      Basophils Relative 0 %      Absolute Neutrophils 20.91 Thousands/µL      Absolute Immature Grans 0.16 Thousand/uL      Absolute Lymphocytes 1.63 Thousands/µL      Absolute Monocytes 2.09 Thousand/µL      Eosinophils Absolute 0.01 Thousand/µL      Basophils Absolute 0.07 Thousands/µL     Levetiracetam level [[id number]] Collected: 04/28/24 1914    Lab Status: In process Specimen: Blood from Arm, Left Updated: 04/28/24 1923                   XR chest portable   ED Interpretation by Garett Dowell MD (04/28 2217)   No acute finding                 Procedures  Procedures         ED Course  ED Course as of 04/28/24 2217   Sun Apr 28, 2024 2136 On-call neurology contacted  regarding patient's presentation and breakthrough seizure in the emergency department.   2154 On-call neurology recommends load with Keppra IV, admit to this facility, increase Keppra maintenance dose to 1 g twice daily.  Will therefore contact medicine and admit patient here.                               SBIRT 20yo+      Flowsheet Row Most Recent Value   Initial Alcohol Screen: US AUDIT-C     1. How often do you have a drink containing alcohol? 0 Filed at: 04/28/2024 1834   2. How many drinks containing alcohol do you have on a typical day you are drinking?  0 Filed at: 04/28/2024 1834   3a. Male UNDER 65: How often do you have five or more drinks on one occasion? 0 Filed at: 04/28/2024 1834   Audit-C Score 0 Filed at: 04/28/2024 1834   DANIELE: How many times in the past year have you...    Used an illegal drug or used a prescription medication for non-medical reasons? Never Filed at: 04/28/2024 1834                      Medical Decision Making  Patient presents to the emergency department with breakthrough seizure.      Based on the MSE and the history provided, diagnostic considerations include but are not limited to seizure    Based on the work-up performed in the emergency department which includes physical examination, laboratory testing, imaging which may include advanced imaging as necessary such as CT scan or ultrasound, it is deemed that the patient will require admission to the hospital for treatment of regular seizure.    Patient noted to have breakthrough seizure in the emergency department requiring IV Ativan.  Case discussed with on-call neurology recommends admit to this facility after IV Keppra load.  Patient is agreeable    Amount and/or Complexity of Data Reviewed  Labs: ordered. Decision-making details documented in ED Course.     Details: Elevated white count is likely reactive  Radiology: ordered and independent interpretation performed. Decision-making details documented in ED Course.      Details: No acute finding on chest x-ray    Risk  Prescription drug management.  Decision regarding hospitalization.             Disposition  Final diagnoses:   Breakthrough seizure (HCC)     Time reflects when diagnosis was documented in both MDM as applicable and the Disposition within this note       Time User Action Codes Description Comment    4/28/2024 10:14 PM Garett Dowell Add [R56.9] Seizure (HCC)     4/28/2024 10:14 PM Garett Dowell Add [G40.919] Breakthrough seizure (HCC)     4/28/2024 10:14 PM Garett Dowell Modify [G40.919] Breakthrough seizure (HCC)     4/28/2024 10:14 PM Garett Dowell Remove [R56.9] Seizure (HCC)           ED Disposition       ED Disposition   Admit    Condition   Stable    Date/Time   Sun Apr 28, 2024 2215    Comment   --             Follow-up Information    None         Patient's Medications   Discharge Prescriptions    No medications on file       No discharge procedures on file.    PDMP Review       None            ED Provider  Electronically Signed by             Garett Dowell MD  04/28/24 5978

## 2024-04-29 VITALS
TEMPERATURE: 98.6 F | WEIGHT: 174.16 LBS | HEART RATE: 67 BPM | BODY MASS INDEX: 23.08 KG/M2 | HEIGHT: 73 IN | OXYGEN SATURATION: 96 % | SYSTOLIC BLOOD PRESSURE: 104 MMHG | RESPIRATION RATE: 20 BRPM | DIASTOLIC BLOOD PRESSURE: 47 MMHG

## 2024-04-29 LAB
AMPHETAMINES SERPL QL SCN: NEGATIVE
ANION GAP SERPL CALCULATED.3IONS-SCNC: 10 MMOL/L (ref 4–13)
BARBITURATES UR QL: NEGATIVE
BENZODIAZ UR QL: NEGATIVE
BILIRUB UR QL STRIP: NEGATIVE
BUN SERPL-MCNC: 11 MG/DL (ref 5–25)
CALCIUM SERPL-MCNC: 9.7 MG/DL (ref 8.4–10.2)
CHLORIDE SERPL-SCNC: 107 MMOL/L (ref 96–108)
CLARITY UR: CLEAR
CO2 SERPL-SCNC: 21 MMOL/L (ref 21–32)
COCAINE UR QL: NEGATIVE
COLOR UR: YELLOW
CREAT SERPL-MCNC: 0.73 MG/DL (ref 0.6–1.3)
ERYTHROCYTE [DISTWIDTH] IN BLOOD BY AUTOMATED COUNT: 12.2 % (ref 11.6–15.1)
FENTANYL UR QL SCN: NEGATIVE
GFR SERPL CREATININE-BSD FRML MDRD: 132 ML/MIN/1.73SQ M
GLUCOSE SERPL-MCNC: 100 MG/DL (ref 65–140)
GLUCOSE UR STRIP-MCNC: NEGATIVE MG/DL
HCT VFR BLD AUTO: 44.6 % (ref 36.5–49.3)
HGB BLD-MCNC: 14.9 G/DL (ref 12–17)
HGB UR QL STRIP.AUTO: NEGATIVE
HYDROCODONE UR QL SCN: NEGATIVE
KETONES UR STRIP-MCNC: ABNORMAL MG/DL
LEUKOCYTE ESTERASE UR QL STRIP: NEGATIVE
LEVETIRACETAM SERPL-MCNC: <2 UG/ML (ref 12–46)
MCH RBC QN AUTO: 30.3 PG (ref 26.8–34.3)
MCHC RBC AUTO-ENTMCNC: 33.4 G/DL (ref 31.4–37.4)
MCV RBC AUTO: 91 FL (ref 82–98)
METHADONE UR QL: NEGATIVE
NITRITE UR QL STRIP: NEGATIVE
OPIATES UR QL SCN: NEGATIVE
OXYCODONE+OXYMORPHONE UR QL SCN: NEGATIVE
PCP UR QL: NEGATIVE
PH UR STRIP.AUTO: 6 [PH]
PLATELET # BLD AUTO: 238 THOUSANDS/UL (ref 149–390)
PMV BLD AUTO: 11.7 FL (ref 8.9–12.7)
POTASSIUM SERPL-SCNC: 3.8 MMOL/L (ref 3.5–5.3)
PROT UR STRIP-MCNC: NEGATIVE MG/DL
RBC # BLD AUTO: 4.92 MILLION/UL (ref 3.88–5.62)
SODIUM SERPL-SCNC: 138 MMOL/L (ref 135–147)
SP GR UR STRIP.AUTO: 1.02 (ref 1–1.03)
THC UR QL: POSITIVE
UROBILINOGEN UR QL STRIP.AUTO: 0.2 E.U./DL
WBC # BLD AUTO: 20.14 THOUSAND/UL (ref 4.31–10.16)

## 2024-04-29 PROCEDURE — 81003 URINALYSIS AUTO W/O SCOPE: CPT | Performed by: NURSE PRACTITIONER

## 2024-04-29 PROCEDURE — 85027 COMPLETE CBC AUTOMATED: CPT | Performed by: NURSE PRACTITIONER

## 2024-04-29 PROCEDURE — 99239 HOSP IP/OBS DSCHRG MGMT >30: CPT | Performed by: FAMILY MEDICINE

## 2024-04-29 PROCEDURE — 80048 BASIC METABOLIC PNL TOTAL CA: CPT | Performed by: NURSE PRACTITIONER

## 2024-04-29 PROCEDURE — 80307 DRUG TEST PRSMV CHEM ANLYZR: CPT | Performed by: NURSE PRACTITIONER

## 2024-04-29 RX ORDER — ZONISAMIDE 100 MG/1
300 CAPSULE ORAL DAILY
Status: DISCONTINUED | OUTPATIENT
Start: 2024-04-29 | End: 2024-04-29 | Stop reason: HOSPADM

## 2024-04-29 RX ORDER — LEVETIRACETAM 500 MG/1
1500 TABLET ORAL EVERY 12 HOURS SCHEDULED
Start: 2024-04-29

## 2024-04-29 RX ORDER — ACETAMINOPHEN 325 MG/1
650 TABLET ORAL EVERY 6 HOURS PRN
Status: DISCONTINUED | OUTPATIENT
Start: 2024-04-29 | End: 2024-04-29 | Stop reason: HOSPADM

## 2024-04-29 RX ORDER — LEVETIRACETAM 500 MG/1
1000 TABLET ORAL EVERY 12 HOURS SCHEDULED
Status: DISCONTINUED | OUTPATIENT
Start: 2024-04-29 | End: 2024-04-29 | Stop reason: HOSPADM

## 2024-04-29 RX ADMIN — LEVETIRACETAM 1000 MG: 500 TABLET, FILM COATED ORAL at 08:10

## 2024-04-29 RX ADMIN — ZONISAMIDE 300 MG: 100 CAPSULE ORAL at 08:10

## 2024-04-29 NOTE — ED NOTES
Md came up to this RN asking for ativan due to patient seizing, this RN and another came to bedside to administer 2mg IV push per Dr Dowell. Pt suctioned for airway, and placed on 6L NC to maintain o2 saturation          Meghna Cha, LOPEZ  04/28/24 8198

## 2024-04-29 NOTE — UTILIZATION REVIEW
NOTIFICATION OF INPATIENT ADMISSION   AUTHORIZATION REQUEST   SERVICING FACILITY:   [place] [address]  Tax ID: [id number]  NPI: [id number] ATTENDING PROVIDER:  Attending Name and NPI#: Shakeel Alcala Md [[id number]]  Address: [address] [place] [address]  Phone: [phone]   ADMISSION INFORMATION:  Place of Service: Inpatient [facility]  Place of Service Code: 21  Inpatient Admission Date/Time: 4/28/24 10:14 PM  Discharge Date/Time: No discharge date for patient encounter.  Admitting Diagnosis Code/Description:  Seizure (HCC) [R56.9]  Breakthrough seizure (HCC) [G40.919]     UTILIZATION REVIEW CONTACT:  Shila Valdes, Utilization   Network Utilization Review Department  Phone: [phone]  Fax [phone]  Email: [email or web address]  Contact for approvals/pending authorizations, clinical reviews, and discharge.     PHYSICIAN ADVISORY SERVICES:  Medical Necessity Denial & Kwjt-fh-[id number] Review  Phone: [phone]  Fax: [phone]  Email: [email or web address]     DISCHARGE SUPPORT TEAM:  For Patients Discharge Needs & Updates  Phone: [phone] opt. 2 Fax: [phone]  Email: [email or web address]

## 2024-04-29 NOTE — NURSING NOTE
Patient requested to speak to provider regarding discharge multiple times. Patient did not want to wait any longer due to feeling good and having experienced before. Patient asked to leave AMA. Dr. Alcala notified and paperwork signed. IV removed and catheter intact. Patient exited unit to wait downstairs for ride at approx 1230.

## 2024-04-29 NOTE — H&P
[facility]  H&P  Name: Tim Alanis Jr. 23 y.o. male I MRN: [id number]  Unit/Bed#: -01 I Date of Admission: 4/28/2024   Date of Service: 4/29/2024 I Hospital Day: 1      Assessment/Plan   * Breakthrough seizure (HCC)  Assessment & Plan  Known history of epileptic seizures patient came in due to seizure activity at home ended up breakthrough seizure while in the ED  ED provider spoke with neurology recommending Keppra loading dose and increasing daily Keppra dose to 1 g every 12 hours  Per patient he takes Keppra 1g [id number] at home which is on home rec. Will need further rec from neurology on dosage recommendations   Will add IV Ativan as needed for breakthrough seizure  Seizure precautions  Neurology consult for morning    Epileptic seizure, generalized (HCC)  Assessment & Plan  Known history plan as above           VTE Pharmacologic Prophylaxis: VTE Score: 0 Low Risk (Score 0-2) - Encourage Ambulation.  Code Status: Level 1 - Full Code full code  Discussion with family: Patient declined call to .     Anticipated Length of Stay: Patient will be admitted on an inpatient basis with an anticipated length of stay of greater than 2 midnights secondary to breakthrough seizure with need for neurology evaluation Keppra load.    Total Time Spent on Date of Encounter in care of patient: 40 mins. This time was spent on one or more of the following: performing physical exam; counseling and coordination of care; obtaining or reviewing history; documenting in the medical record; reviewing/ordering tests, medications or procedures; communicating with other healthcare professionals and discussing with patient's family/caregivers.    Chief Complaint: Breakthrough seizure    History of Present Illness:  Tim Alanis Jr. is a 23 y.o. male with a PMH of epileptic seizure who presents with breakthrough seizure at home and witnessed in the ED.  Patient on normal state of health when  he experienced a breakthrough seizure at home came to the ED for further evaluation and recommendations while in the ED experienced a breakthrough seizure requiring IV Ativan.  ED provider spoke with neurology team who recommended loading patient with 2 g of IV Keppra and increasing his daily dose to 1 g every 12 hours met for further monitoring breakthrough seizure and neurology recommendations.  Noted to be postictal resolving able to follow my commands    Review of Systems:  Review of Systems   Constitutional: Negative.    HENT: Negative.     Respiratory: Negative.     Cardiovascular: Negative.    Neurological:  Positive for seizures.   Psychiatric/Behavioral: Negative.         Past Medical and Surgical History:   Past Medical History:   Diagnosis Date    Seizure (HCC)        History reviewed. No pertinent surgical history.    Meds/Allergies:  Prior to Admission medications    Medication Sig Start Date End Date Taking? Authorizing Provider   levETIRAcetam (KEPPRA) 500 mg tablet Take 2 tablets (1,000 mg total) by mouth every 12 (twelve) hours 2/14/24   Darlene Crowe MD   zonisamide (ZONEGRAN) 100 mg capsule Take 3 capsules (300 mg total) by mouth daily 2/14/24 3/15/24  Darlene Crowe MD     I have reviewed home medications with patient personally.    Allergies: No Known Allergies    Social History:  Marital Status: Single   Occupation:   Patient Pre-hospital Living Situation: Home  Patient Pre-hospital Level of Mobility: walks  Patient Pre-hospital Diet Restrictions:  None  Substance Use History:   Social History     Substance and Sexual Activity   Alcohol Use Yes    Comment: sociall     Social History     Tobacco Use   Smoking Status Former    Current packs/day: 1.00    Average packs/day: 1 pack/day for 5.0 years (5.0 ttl pk-yrs)    Types: Cigarettes   Smokeless Tobacco Never     Social History     Substance and Sexual Activity   Drug Use Not Currently    Types: Marijuana    Comment: per mother pt has history  of meth amphetamine abuse       Family History:  History reviewed. No pertinent family history.    Physical Exam:     Vitals:   Blood Pressure: 105/53 (04/28/24 2311)  Pulse: 63 (04/28/24 2311)  Temperature: 98.6 °F (37 °C) (04/28/24 2311)  Temp Source: Temporal (04/28/24 2311)  Respirations: 16 (04/28/24 2311)  Weight - Scale: 79 kg (174 lb 2.6 oz) (04/28/24 2311)  SpO2: 97 % (04/28/24 2311)    Physical Exam  Vitals and nursing note reviewed.   Constitutional:       General: He is sleeping. He is not in acute distress.     Appearance: He is well-developed.   HENT:      Head: Normocephalic and atraumatic.   Eyes:      Conjunctiva/sclera: Conjunctivae normal.   Cardiovascular:      Rate and Rhythm: Normal rate and regular rhythm.      Heart sounds: No murmur heard.  Pulmonary:      Effort: Pulmonary effort is normal. No respiratory distress.      Breath sounds: Normal breath sounds.   Abdominal:      Palpations: Abdomen is soft.      Tenderness: There is no abdominal tenderness.   Musculoskeletal:         General: No swelling.      Cervical back: Neck supple.   Skin:     General: Skin is warm and dry.      Capillary Refill: Capillary refill takes less than 2 seconds.   Neurological:      Mental Status: He is oriented to person, place, and time and easily aroused.   Psychiatric:         Mood and Affect: Mood normal.           Additional Data:     Lab Results:  Results from last 7 days   Lab Units 04/28/24 1914   WBC Thousand/uL 24.87*   HEMOGLOBIN g/dL 14.9   HEMATOCRIT % 44.5   PLATELETS Thousands/uL 264   SEGS PCT % 84*   LYMPHO PCT % 7*   MONO PCT % 8   EOS PCT % 0     Results from last 7 days   Lab Units 04/28/24 1914   SODIUM mmol/L 138   POTASSIUM mmol/L 3.3*   CHLORIDE mmol/L 105   CO2 mmol/L 17*   BUN mg/dL 12   CREATININE mg/dL 0.94   ANION GAP mmol/L 16*   CALCIUM mg/dL 9.4   ALBUMIN g/dL 4.8   TOTAL BILIRUBIN mg/dL 0.49   ALK PHOS U/L 73   ALT U/L 11   AST U/L 16   GLUCOSE RANDOM mg/dL 177*                        Lines/Drains:  Invasive Devices       Peripheral Intravenous Line  Duration             Peripheral IV 04/28/24 Left;Ventral (anterior) Forearm 1 day                        Imaging: Personally reviewed the following imaging: chest xray  XR chest portable   ED Interpretation by Garett Dowell MD (04/28 2217)   No acute finding          EKG and Other Studies Reviewed on Admission:   EKG: No EKG obtained.    ** Please Note: This note has been constructed using a voice recognition system. **

## 2024-04-29 NOTE — PLAN OF CARE
Problem: DISCHARGE PLANNING  Goal: Discharge to home or other facility with appropriate resources  Description: INTERVENTIONS:  - Identify barriers to discharge w/patient and caregiver  - Arrange for needed discharge resources and transportation as appropriate  - Identify discharge learning needs (meds, wound care, etc.)  - Arrange for interpretive services to assist at discharge as needed  - Refer to Case Management Department for coordinating discharge planning if the patient needs post-hospital services based on physician/advanced practitioner order or complex needs related to functional status, cognitive ability, or social support system  Outcome: Progressing     Problem: Knowledge Deficit  Goal: Patient/family/caregiver demonstrates understanding of disease process, treatment plan, medications, and discharge instructions  Description: Complete learning assessment and assess knowledge base.  Interventions:  - Provide teaching at level of understanding  - Provide teaching via preferred learning methods  Outcome: Progressing     Problem: NEUROSENSORY - ADULT  Goal: Achieves stable or improved neurological status  Description: INTERVENTIONS  - Monitor and report changes in neurological status  - Monitor vital signs such as temperature, blood pressure, glucose, and any other labs ordered   - Initiate measures to prevent increased intracranial pressure  - Monitor for seizure activity and implement precautions if appropriate      Outcome: Progressing  Goal: Remains free of injury related to seizures activity  Description: INTERVENTIONS  - Maintain airway, patient safety  and administer oxygen as ordered  - Monitor patient for seizure activity, document and report duration and description of seizure to physician/advanced practitioner  - If seizure occurs,  ensure patient safety during seizure  - Reorient patient post seizure  - Seizure pads on all 4 side rails  - Instruct patient/family to notify RN of any seizure  activity including if an aura is experienced  - Instruct patient/family to call for assistance with activity based on nursing assessment  - Administer anti-seizure medications if ordered    Outcome: Progressing  Goal: Achieves maximal functionality and self care  Description: INTERVENTIONS  - Monitor swallowing and airway patency with patient fatigue and changes in neurological status  - Encourage and assist patient to increase activity and self care.   - Encourage visually impaired, hearing impaired and aphasic patients to use assistive/communication devices  Outcome: Progressing

## 2024-04-29 NOTE — ASSESSMENT & PLAN NOTE
Known history of epileptic seizures patient came in due to seizure activity at home ended up breakthrough seizure while in the ED  ED provider spoke with neurology recommending Keppra loading dose and increasing daily Keppra dose to 1 g every 12 hours  Per patient he takes Keppra 1g [id number] at home which is on home rec. Will need further rec from neurology on dosage recommendations   Will add IV Ativan as needed for breakthrough seizure  Seizure precautions  Neurology consult for morning  Per patient, he was missing dose, recently his Keppra dose increased to 1500 mg twice daily,  Patient decided to leave AGAINST MEDICAL ADVICE.  Risk versus benefits, side effects and alternative discussed in details.  Patient verbalized understanding agrees.  Patient reports he already has upcoming appointment with his neurologist.  Per patient he does not drive due to seizure disorder.

## 2024-04-29 NOTE — DISCHARGE SUMMARY
[facility]  Discharge- Tim Alanis JrVero 2000, 23 y.o. male MRN: [id number]  Unit/Bed#: -01 Encounter: [id number]  Primary Care Provider: No primary care provider on file.   Date and time admitted to hospital: 4/28/2024  6:29 PM    * Breakthrough seizure (HCC)  Assessment & Plan  Known history of epileptic seizures patient came in due to seizure activity at home ended up breakthrough seizure while in the ED  ED provider spoke with neurology recommending Keppra loading dose and increasing daily Keppra dose to 1 g every 12 hours  Per patient he takes Keppra 1g [id number] at home which is on home rec. Will need further rec from neurology on dosage recommendations   Will add IV Ativan as needed for breakthrough seizure  Seizure precautions  Neurology consult for morning  Per patient, he was missing dose, recently his Keppra dose increased to 1500 mg twice daily,  Patient decided to leave AGAINST MEDICAL ADVICE.  Risk versus benefits, side effects and alternative discussed in details.  Patient verbalized understanding agrees.  Patient reports he already has upcoming appointment with his neurologist.  Per patient he does not drive due to seizure disorder.    Epileptic seizure, generalized (HCC)  Assessment & Plan  Known history plan as above  As outlined in breakthrough seizure section.        Medical Problems       Resolved Problems  Date Reviewed: 4/28/2024   None       Discharging Physician / Practitioner: Shakeel Alcala MD  PCP: No primary care provider on file.  Admission Date:   Admission Orders (From admission, onward)       Ordered        04/28/24 2214  INPATIENT ADMISSION  Once                          Discharge Date: 04/29/24    Consultations During Hospital Stay:  Neurology    Procedures Performed:   XR chest portable   ED Interpretation by Garett Dowell MD (04/28 2217)   No acute finding      Final Result by Estephania Lee MD (04/29 0831)      No acute  cardiopulmonary disease.            Workstation performed: [id number]               Significant Findings / Test Results:   Lab Results   Component Value Date    WBC 20.14 (H) 04/29/2024    HGB 14.9 04/29/2024    HCT 44.6 04/29/2024    MCV 91 04/29/2024     04/29/2024     Lab Results   Component Value Date    SODIUM 138 04/29/2024    K 3.8 04/29/2024     04/29/2024    CO2 21 04/29/2024    AGAP 10 04/29/2024    BUN 11 04/29/2024    CREATININE 0.73 04/29/2024    GLUC 100 04/29/2024    GLUF 93 02/14/2024    CALCIUM 9.7 04/29/2024    AST 16 04/28/2024    ALT 11 04/28/2024    ALKPHOS 73 04/28/2024    TP 7.1 04/28/2024    TBILI 0.49 04/28/2024    EGFR 132 04/29/2024         Incidental Findings:   As mentioned above  I reviewed the above mentioned incidental findings with the patient and/or family and they expressed understanding.    Test Results Pending at Discharge (will require follow up):   As above     Outpatient Tests Requested:  none    Complications:  none    Reason for Admission: Breakthrough seizure disorder    Hospital Course:   Tim Alanis Jr. is a 23 y.o. male patient who originally presented to the hospital on 4/28/2024 due to breakthrough seizure.  Patient reports he missed a few doses of his antiepileptic medication.  Admitted and received loading dose of Keppra.  Neurology consulted.  But patient decided to leave the hospital AGAINST MEDICAL ADVICE.  Patient verbalized understanding regarding risk versus benefits, side effects and alternative.  Patient reports he already has upcoming appointment with his neurology.  And he does not have privileges to drive.    Patient also understand that he has the right to come back to hospital for any needed.    The patient, initially admitted to the hospital as inpatient, was discharged earlier than expected given the following: Left AGAINST MEDICAL ADVICE.    Please see above list of diagnoses and related plan for additional information.     Condition  "at Discharge: stable    Discharge Day Visit / Exam:   Subjective: Seen and evaluated during the event.  Standing up, moving around.  Alert and oriented.  Denies any significant complaint.  Vitals: Blood Pressure: (!) 104/47 (04/29/24 0732)  Pulse: 67 (04/29/24 0732)  Temperature: 98.6 °F (37 °C) (04/29/24 0732)  Temp Source: Temporal (04/29/24 0732)  Respirations: 20 (04/29/24 0732)  Height: 6' 1\" (185.4 cm) (04/29/24 0415)  Weight - Scale: 79 kg (174 lb 2.6 oz) (04/28/24 2311)  SpO2: 96 % (04/29/24 0732)  Exam:   Physical Exam  Vitals and nursing note reviewed.   Constitutional:       Appearance: He is not ill-appearing or diaphoretic.   Neurological:      Mental Status: He is alert and oriented to person, place, and time.      Cranial Nerves: No cranial nerve deficit.      Sensory: No sensory deficit.      Motor: No weakness.      Coordination: Coordination normal.      Gait: Gait normal.      Deep Tendon Reflexes: Reflexes normal.          Discussion with Family:  with patient.     Discharge instructions/Information to patient and family:   See after visit summary for information provided to patient and family.      Provisions for Follow-Up Care:  See after visit summary for information related to follow-up care and any pertinent home health orders.      Mobility at time of Discharge:   Basic Mobility Inpatient Raw Score: 24  JH-HLM Goal: 8: Walk 250 feet or more  JH-HLM Achieved: 8: Walk 250 feet ot more  HLM Goal achieved. Continue to encourage appropriate mobility.     Disposition:   Home left AGAINST MEDICAL ADVICE    Planned Readmission: none     Discharge Statement:  Greater than 50% of the total time was spent examining patient, answering all patient questions, arranging and discussing plan of care with patient as well as directly providing post-discharge instructions.  Additional time then spent on discharge activities.    Discharge Medications:  See after visit summary for reconciled discharge " medications provided to patient and/or family.      **Please Note: This note may have been constructed using a voice recognition system**

## 2024-04-29 NOTE — ASSESSMENT & PLAN NOTE
Known history of epileptic seizures patient came in due to seizure activity at home ended up breakthrough seizure while in the ED  ED provider spoke with neurology recommending Keppra loading dose and increasing daily Keppra dose to 1 g every 12 hours  Per patient he takes Keppra 1g [id number] at home which is on home rec. Will need further rec from neurology on dosage recommendations   Will add IV Ativan as needed for breakthrough seizure  Seizure precautions  Neurology consult for morning

## 2024-04-29 NOTE — UTILIZATION REVIEW
Initial Clinical Review    Admission: Date/Time/Statement:   Admission Orders (From admission, onward)       Ordered        04/28/24 2214  INPATIENT ADMISSION  Once                     Orders Placed This Encounter   Procedures    INPATIENT ADMISSION     Standing Status:   Standing     Number of Occurrences:   1     Order Specific Question:   Level of Care     Answer:   Med Surg [16]     Order Specific Question:   Estimated length of stay     Answer:   More than 2 Midnights     Order Specific Question:   Certification     Answer:   I certify that inpatient services are medically necessary for this patient for a duration of greater than two midnights. See H&P and MD Progress Notes for additional information about the patient's course of treatment.     ED Arrival Information       Expected   -    Arrival   4/28/2024 18:29    Acuity   Urgent              Means of arrival   Ambulance    Escorted by   [facility]   Hospitalist    Admission type   Emergency              Arrival complaint   seizures          Chief Complaint   Patient presents with    Seizure - Prior Hx Of     Pt hx of seizures on keppra and Lamictal. States compliant with meds. Per ems family witnessed 3 grand mal seizures. Pt states headache 9/10      Initial Presentation:   24 y/o male with PMHx Seizure Disorder on Keppra - presents via EMS to [facility] ED on 4/28/24 2nd a breakthrough seizure at home + while in the ED experienced another breakthrough seizure requiring IV Ativan.  In ED awake = fully oriented initially but post seizure - noted in Ictal state.    In ED - HR 58  /72  Exam: No focal neuro deficit  Chest X ray - No acute Cardio- Pumonary disease.  Labs: WBC 24,870      K+ 3.3   UDS +THC  ED Tx:  IV Ativan 2 mg + Per ED MD discussion with Neurologist - loading dose 2 Gram of IV Keppra and daily dose increased to 1 Gram every 12 hours   Admitted Inpatient 4/28/24 at 2214 -      Breakthrough seizure (HCC)  Known  history of epileptic seizures patient came in due to seizure activity at home ended up breakthrough seizure while in the ED  ED provider spoke with neurology recommending Keppra loading dose and increasing daily Keppra dose to 1 g every 12 hours  Per patient he takes Keppra 1g [id number] at home which is on home rec. Will need further rec from neurology on dosage recommendations   Will add IV Ativan as needed for breakthrough seizure  Seizure precautions  Neurology consult for morning     Epileptic seizure, generalized (HCC)  Known history plan as above    MD Anticipated Length of Stay/Certification Statement: Patient will be admitted on an inpatient basis with an anticipated length of stay of greater than 2 midnights secondary to breakthrough seizure with need for neurology evaluation Keppra load.       4/29/24 - DAY 2:  No further seizure activity on Keppra 2 Grams q12hrs  Neurology consulted BUT    Decided to leave AGAINST MEDICAL ADVICE.  Risk versus benefits, side effects and alternative discussed in details.    Patient reports he already has upcoming appointment with his neurologist.  Per patient he does not drive due to seizure disorder.    *LEFT AMA 4/29/24 AT 1251      ED Triage Vitals   Temperature Pulse Respirations Blood Pressure SpO2   04/28/24 1832 04/28/24 1832 04/28/24 1832 04/28/24 1832 04/28/24 1832   (!) 96.6 °F (35.9 °C) 58 18 130/72 100 %      Temp Source Heart Rate Source Patient Position - Orthostatic VS BP Location FiO2 (%)   04/28/24 1832 04/28/24 2311 04/28/24 2311 04/28/24 2311 --   Temporal Monitor Lying Left arm       Pain Score       04/28/24 1832       9          Wt Readings from Last 1 Encounters:   04/28/24 79 kg (174 lb 2.6 oz)     Additional Vital Signs:   Date/Time Temp Pulse Resp BP MAP (mmHg) SpO2 O2 Device Patient Position - Orthostatic VS   04/29/24 0810 -- -- -- -- -- -- None (Room air) --   04/29/24 07:32:30 98.6 °F (37 °C) 67 20 104/47 Abnormal  66 96 % None (Room air) Lying    04/28/24 23:11:56 98.6 °F (37 °C) 63 16 105/53 70 97 % None (Room air) Lying   04/28/24 2115 -- 78 16 157/72 103 93 % None (Room air) --   04/28/24 2030 -- 58 16 118/75 91 97 % None (Room air) --   04/28/24 1936 -- -- -- -- -- -- None (Room air) --   04/28/24 1930 -- 59 -- 122/75 92 97 % -- --   04/28/24 1900 -- 57 -- 138/84 108 99 % -- --   04/28/24 1832 96.6 °F (35.9 °C) Abnormal  58 18 130/72 -- 100 % None (Room air) --     Pertinent Labs/Diagnostic Test Results:   XR chest portable   No acute cardiopulmonary disease.       Results from last 7 days   Lab Units 04/29/24 0422 04/28/24 1914   WBC Thousand/uL 20.14* 24.87*   HEMOGLOBIN g/dL 14.9 14.9   HEMATOCRIT % 44.6 44.5   PLATELETS Thousands/uL 238 264   TOTAL NEUT ABS Thousands/µL  --  20.91*     Results from last 7 days   Lab Units 04/29/24 0422 04/28/24 1914   SODIUM mmol/L 138 138   POTASSIUM mmol/L 3.8 3.3*   CHLORIDE mmol/L 107 105   CO2 mmol/L 21 17*   ANION GAP mmol/L 10 16*   BUN mg/dL 11 12   CREATININE mg/dL 0.73 0.94   EGFR ml/min/1.73sq m 132 115   CALCIUM mg/dL 9.7 9.4     Results from last 7 days   Lab Units 04/28/24 1914   AST U/L 16   ALT U/L 11   ALK PHOS U/L 73   TOTAL PROTEIN g/dL 7.1   ALBUMIN g/dL 4.8   TOTAL BILIRUBIN mg/dL 0.49     Results from last 7 days   Lab Units 04/29/24 0422 04/28/24 1914   GLUCOSE RANDOM mg/dL 100 177*     Results from last 7 days   Lab Units 04/29/24  0936   CLARITY UA  Clear   COLOR UA  Yellow   SPEC GRAV UA  1.025   PH UA  6.0   GLUCOSE UA mg/dl Negative   KETONES UA mg/dl 15 (1+)*   BLOOD UA  Negative   PROTEIN UA mg/dl Negative   NITRITE UA  Negative   BILIRUBIN UA  Negative   UROBILINOGEN UA E.U./dl 0.2   LEUKOCYTES UA  Negative       Results from last 7 days   Lab Units 04/29/24  0936   AMPH/METH  Negative   BARBITURATE UR  Negative   BENZODIAZEPINE UR  Negative   COCAINE UR  Negative   METHADONE URINE  Negative   OPIATE UR  Negative   PCP UR  Negative   THC UR  Positive*                                        ED Treatment:   Medication Administration from 04/28/2024 1829 to 04/28/2024 2307         Date/Time Order Dose Route Action     04/28/2024 2113 EDT LORazepam (ATIVAN) injection 2 mg 2 mg Intravenous Given by Other     04/28/2024 2211 EDT levETIRAcetam (KEPPRA) injection 2,000 mg 2,000 mg Intravenous Given          Past Medical History:   Diagnosis Date    Seizure (HCC)      Present on Admission:   Epileptic seizure, generalized (HCC)   Breakthrough seizure (HCC)    Admitting Diagnosis: Seizure (HCC) [R56.9]  Breakthrough seizure (HCC) [G40.919]    Age/Sex: 23 y.o. male    Admission Orders:      Medications 04/28 04/29   levETIRAcetam (KEPPRA) injection 2,000 mg  Dose: 2,000 mg  Freq: Once Route: IV  Start: 04/28/24 2200 End: 04/28/24 2211   Admin Instructions:       2211         levETIRAcetam (KEPPRA) tablet 1,000 mg  Dose: 1,000 mg  Freq: Every 12 hours scheduled Route: PO  Start: 04/29/24 0900 End: 04/29/24 1451   Admin Instructions:        0810     1451-D/C'd      LORazepam (ATIVAN) injection 2 mg  Dose: 2 mg  Freq: Once Route: IV  Start: 04/28/24 2115 End: 04/28/24 2113   Admin Instructions:       2113 [C]         zonisamide (ZONEGRAN) capsule 300 mg  Dose: 300 mg  Freq: Daily Route: PO  Start: 04/29/24 0900 End: 04/29/24 1451   Admin Instructions:        0810     1451-D/C'd        Continuous Infusions: NONE    PRN Meds:   Medications 04/28 04/29   acetaminophen (TYLENOL) tablet 650 mg  Dose: 650 mg  Freq: Every 6 hours PRN Route: PO  PRN Reasons: mild pain,headaches,fever  Indications of Use: FEVER,HEADACHE,MILD PAIN  Start: 04/29/24 0028 End: 04/29/24 1451     1451-D/C'd        Network Utilization Review Department  ATTENTION: Please call with any questions or concerns to [phone] and carefully listen to the prompts so that you are directed to the right person. All voicemails are confidential.   For Discharge needs, contact Care Management DC Support Team at [phone] opt. 2  Send all  requests for admission clinical reviews, approved or denied determinations and any other requests to dedicated fax number below belonging to the campus where the patient is receiving treatment. List of dedicated fax numbers for the Facilities:  FACILITY NAME UR FAX NUMBER   ADMISSION DENIALS (Administrative/Medical Necessity) [phone]   DISCHARGE SUPPORT TEAM (NETWORK) [phone]   PARENT CHILD HEALTH (Maternity/NICU/Pediatrics) [phone]   [facility] [phone]   [facility] [phone]   [facility] [phone]   [facility] [phone]   [facility] [phone]   [facility] [phone]   [facility] [phone]   [facility] [phone]   [facility] [phone]   [facility] [phone]   [facility] [phone]   [facility] [phone]

## 2024-04-30 NOTE — UTILIZATION REVIEW
NOTIFICATION OF ADMISSION DISCHARGE   This is a Notification of Discharge from [facility]. Please be advised that this patient has been discharge from our facility. Below you will find the admission and discharge date and time including the patient’s disposition.   UTILIZATION REVIEW CONTACT:  Shila Valdes  Utilization   Network Utilization Review Department  Phone: [phone] x carefully listen to the prompts. All voicemails are confidential.  Email: [email or web address]     ADMISSION INFORMATION  PRESENTATION DATE: 4/28/2024  6:29 PM  OBERVATION ADMISSION DATE:   INPATIENT ADMISSION DATE: 4/28/24 10:14 PM   DISCHARGE DATE: 4/29/2024 12:51 PM   DISPOSITION:Left against medical advice or discontinued care    Network Utilization Review Department  ATTENTION: Please call with any questions or concerns to [phone] and carefully listen to the prompts so that you are directed to the right person. All voicemails are confidential.   For Discharge needs, contact Care Management DC Support Team at [phone] opt. 2  Send all requests for admission clinical reviews, approved or denied determinations and any other requests to dedicated fax number below belonging to the campus where the patient is receiving treatment. List of dedicated fax numbers for the Facilities:  FACILITY NAME UR FAX NUMBER   ADMISSION DENIALS (Administrative/Medical Necessity) [phone]   DISCHARGE SUPPORT TEAM ([facility]) [phone]   PARENT CHILD HEALTH (Maternity/NICU/Pediatrics) [phone]   [facility] [phone]   [facility] [phone]   [facility] [phone]   [facility] [phone]   [facility] [phone]   [facility] [phone]   [facility] [phone]   [facility]  Critical access hospital [phone]   [facility] [phone]   [facility] [phone]   [facility] [phone]   [facility] [phone]

## 2024-10-06 ENCOUNTER — HOSPITAL ENCOUNTER (EMERGENCY)
Facility: HOSPITAL | Age: 24
End: 2024-10-06
Attending: EMERGENCY MEDICINE
Payer: COMMERCIAL

## 2024-10-06 ENCOUNTER — APPOINTMENT (EMERGENCY)
Dept: RADIOLOGY | Facility: HOSPITAL | Age: 24
End: 2024-10-06
Payer: COMMERCIAL

## 2024-10-06 ENCOUNTER — APPOINTMENT (EMERGENCY)
Dept: CT IMAGING | Facility: HOSPITAL | Age: 24
End: 2024-10-06
Payer: COMMERCIAL

## 2024-10-06 ENCOUNTER — APPOINTMENT (INPATIENT)
Dept: RADIOLOGY | Facility: HOSPITAL | Age: 24
DRG: 053 | End: 2024-10-06
Payer: COMMERCIAL

## 2024-10-06 ENCOUNTER — HOSPITAL ENCOUNTER (INPATIENT)
Facility: HOSPITAL | Age: 24
LOS: 2 days | Discharge: LEFT BEFORE DISCHARGE | DRG: 053 | End: 2024-10-08
Attending: EMERGENCY MEDICINE | Admitting: EMERGENCY MEDICINE
Payer: COMMERCIAL

## 2024-10-06 ENCOUNTER — APPOINTMENT (INPATIENT)
Dept: NEUROLOGY | Facility: CLINIC | Age: 24
DRG: 053 | End: 2024-10-06
Payer: COMMERCIAL

## 2024-10-06 VITALS
DIASTOLIC BLOOD PRESSURE: 74 MMHG | BODY MASS INDEX: 22.98 KG/M2 | RESPIRATION RATE: 21 BRPM | OXYGEN SATURATION: 100 % | WEIGHT: 174.16 LBS | SYSTOLIC BLOOD PRESSURE: 143 MMHG | HEART RATE: 103 BPM | TEMPERATURE: 97 F

## 2024-10-06 DIAGNOSIS — J96.90 RESPIRATORY FAILURE (HCC): ICD-10-CM

## 2024-10-06 DIAGNOSIS — G40.901 STATUS EPILEPTICUS (HCC): Primary | ICD-10-CM

## 2024-10-06 DIAGNOSIS — G40.309 EPILEPTIC SEIZURE, GENERALIZED (HCC): Primary | ICD-10-CM

## 2024-10-06 DIAGNOSIS — F19.90 DRUG USE: ICD-10-CM

## 2024-10-06 DIAGNOSIS — G40.901 STATUS EPILEPTICUS (HCC): ICD-10-CM

## 2024-10-06 DIAGNOSIS — N17.9 AKI (ACUTE KIDNEY INJURY) (HCC): ICD-10-CM

## 2024-10-06 LAB
ALBUMIN SERPL BCG-MCNC: 4.8 G/DL (ref 3.5–5)
ALBUMIN SERPL BCG-MCNC: 5.2 G/DL (ref 3.5–5)
ALP SERPL-CCNC: 65 U/L (ref 34–104)
ALP SERPL-CCNC: 65 U/L (ref 34–104)
ALT SERPL W P-5'-P-CCNC: 13 U/L (ref 7–52)
ALT SERPL W P-5'-P-CCNC: 15 U/L (ref 7–52)
AMPHETAMINES SERPL QL SCN: POSITIVE
ANION GAP SERPL CALCULATED.3IONS-SCNC: 30 MMOL/L (ref 4–13)
ANION GAP SERPL CALCULATED.3IONS-SCNC: 9 MMOL/L (ref 4–13)
APAP SERPL-MCNC: <2 UG/ML (ref 10–20)
AST SERPL W P-5'-P-CCNC: 20 U/L (ref 13–39)
AST SERPL W P-5'-P-CCNC: 22 U/L (ref 13–39)
BACTERIA UR QL AUTO: NORMAL /HPF
BARBITURATES UR QL: NEGATIVE
BASE EX.OXY STD BLDV CALC-SCNC: 72.8 % (ref 60–80)
BASE EX.OXY STD BLDV CALC-SCNC: 93.9 % (ref 60–80)
BASE EXCESS BLDV CALC-SCNC: -25.8 MMOL/L
BASE EXCESS BLDV CALC-SCNC: -4.5 MMOL/L
BASOPHILS # BLD AUTO: 0.05 THOUSANDS/ΜL (ref 0–0.1)
BASOPHILS # BLD AUTO: 0.09 THOUSANDS/ΜL (ref 0–0.1)
BASOPHILS NFR BLD AUTO: 0 % (ref 0–1)
BASOPHILS NFR BLD AUTO: 0 % (ref 0–1)
BENZODIAZ UR QL: NEGATIVE
BILIRUB SERPL-MCNC: 0.29 MG/DL (ref 0.2–1)
BILIRUB SERPL-MCNC: 0.44 MG/DL (ref 0.2–1)
BILIRUB UR QL STRIP: NEGATIVE
BUN SERPL-MCNC: 11 MG/DL (ref 5–25)
BUN SERPL-MCNC: 13 MG/DL (ref 5–25)
CA-I BLD-SCNC: 1.13 MMOL/L (ref 1.12–1.32)
CALCIUM SERPL-MCNC: 8.8 MG/DL (ref 8.4–10.2)
CALCIUM SERPL-MCNC: 9.3 MG/DL (ref 8.4–10.2)
CARDIAC TROPONIN I PNL SERPL HS: 7 NG/L
CHLORIDE SERPL-SCNC: 101 MMOL/L (ref 96–108)
CHLORIDE SERPL-SCNC: 108 MMOL/L (ref 96–108)
CK SERPL-CCNC: 418 U/L (ref 39–308)
CLARITY UR: CLEAR
CO2 SERPL-SCNC: 23 MMOL/L (ref 21–32)
CO2 SERPL-SCNC: 8 MMOL/L (ref 21–32)
COCAINE UR QL: NEGATIVE
COLOR UR: YELLOW
CREAT SERPL-MCNC: 1 MG/DL (ref 0.6–1.3)
CREAT SERPL-MCNC: 1.06 MG/DL (ref 0.6–1.3)
EOSINOPHIL # BLD AUTO: 0.02 THOUSAND/ΜL (ref 0–0.61)
EOSINOPHIL # BLD AUTO: 0.06 THOUSAND/ΜL (ref 0–0.61)
EOSINOPHIL NFR BLD AUTO: 0 % (ref 0–6)
EOSINOPHIL NFR BLD AUTO: 0 % (ref 0–6)
ERYTHROCYTE [DISTWIDTH] IN BLOOD BY AUTOMATED COUNT: 12.2 % (ref 11.6–15.1)
ERYTHROCYTE [DISTWIDTH] IN BLOOD BY AUTOMATED COUNT: 12.4 % (ref 11.6–15.1)
ETHANOL SERPL-MCNC: <10 MG/DL
FENTANYL UR QL SCN: NEGATIVE
GFR SERPL CREATININE-BSD FRML MDRD: 106 ML/MIN/1.73SQ M
GFR SERPL CREATININE-BSD FRML MDRD: 99 ML/MIN/1.73SQ M
GLUCOSE SERPL-MCNC: 260 MG/DL (ref 65–140)
GLUCOSE SERPL-MCNC: 81 MG/DL (ref 65–140)
GLUCOSE UR STRIP-MCNC: ABNORMAL MG/DL
HCO3 BLDV-SCNC: 22.3 MMOL/L (ref 24–30)
HCO3 BLDV-SCNC: 5.9 MMOL/L (ref 24–30)
HCT VFR BLD AUTO: 43.3 % (ref 36.5–49.3)
HCT VFR BLD AUTO: 48.4 % (ref 36.5–49.3)
HGB BLD-MCNC: 14.5 G/DL (ref 12–17)
HGB BLD-MCNC: 15.2 G/DL (ref 12–17)
HGB UR QL STRIP.AUTO: ABNORMAL
HOROWITZ INDEX BLDA+IHG-RTO: 40 MM[HG]
HYDROCODONE UR QL SCN: NEGATIVE
IMM GRANULOCYTES # BLD AUTO: 0.16 THOUSAND/UL (ref 0–0.2)
IMM GRANULOCYTES # BLD AUTO: 0.23 THOUSAND/UL (ref 0–0.2)
IMM GRANULOCYTES NFR BLD AUTO: 1 % (ref 0–2)
IMM GRANULOCYTES NFR BLD AUTO: 1 % (ref 0–2)
KETONES UR STRIP-MCNC: NEGATIVE MG/DL
LACTATE SERPL-SCNC: 19.2 MMOL/L (ref 0.5–2)
LACTATE SERPL-SCNC: 3.3 MMOL/L (ref 0.5–2)
LEUKOCYTE ESTERASE UR QL STRIP: NEGATIVE
LEVETIRACETAM SERPL-MCNC: <2 UG/ML (ref 12–46)
LYMPHOCYTES # BLD AUTO: 1.49 THOUSANDS/ΜL (ref 0.6–4.47)
LYMPHOCYTES # BLD AUTO: 3.74 THOUSANDS/ΜL (ref 0.6–4.47)
LYMPHOCYTES NFR BLD AUTO: 14 % (ref 14–44)
LYMPHOCYTES NFR BLD AUTO: 5 % (ref 14–44)
MAGNESIUM SERPL-MCNC: 2.1 MG/DL (ref 1.9–2.7)
MCH RBC QN AUTO: 30.3 PG (ref 26.8–34.3)
MCH RBC QN AUTO: 30.9 PG (ref 26.8–34.3)
MCHC RBC AUTO-ENTMCNC: 31.4 G/DL (ref 31.4–37.4)
MCHC RBC AUTO-ENTMCNC: 33.5 G/DL (ref 31.4–37.4)
MCV RBC AUTO: 92 FL (ref 82–98)
MCV RBC AUTO: 96 FL (ref 82–98)
METHADONE UR QL: NEGATIVE
MONOCYTES # BLD AUTO: 2.53 THOUSAND/ΜL (ref 0.17–1.22)
MONOCYTES # BLD AUTO: 3.31 THOUSAND/ΜL (ref 0.17–1.22)
MONOCYTES NFR BLD AUTO: 12 % (ref 4–12)
MONOCYTES NFR BLD AUTO: 9 % (ref 4–12)
NEUTROPHILS # BLD AUTO: 20.97 THOUSANDS/ΜL (ref 1.85–7.62)
NEUTROPHILS # BLD AUTO: 22.33 THOUSANDS/ΜL (ref 1.85–7.62)
NEUTS SEG NFR BLD AUTO: 76 % (ref 43–75)
NEUTS SEG NFR BLD AUTO: 82 % (ref 43–75)
NITRITE UR QL STRIP: NEGATIVE
NON-SQ EPI CELLS URNS QL MICRO: NORMAL /HPF
NRBC BLD AUTO-RTO: 0 /100 WBCS
NRBC BLD AUTO-RTO: 0 /100 WBCS
O2 CT BLDV-SCNC: 15.3 ML/DL
O2 CT BLDV-SCNC: 21.9 ML/DL
OPIATES UR QL SCN: NEGATIVE
OXYCODONE+OXYMORPHONE UR QL SCN: NEGATIVE
PCO2 BLDV: 28.6 MM HG (ref 42–50)
PCO2 BLDV: 47.5 MM HG (ref 42–50)
PCP UR QL: NEGATIVE
PEEP RESPIRATORY: 6 CM[H2O]
PH BLDV: 6.93 [PH] (ref 7.3–7.4)
PH BLDV: 7.29 [PH] (ref 7.3–7.4)
PH UR STRIP.AUTO: 5.5 [PH]
PHOSPHATE SERPL-MCNC: 2 MG/DL (ref 2.7–4.5)
PLATELET # BLD AUTO: 269 THOUSANDS/UL (ref 149–390)
PLATELET # BLD AUTO: 310 THOUSANDS/UL (ref 149–390)
PMV BLD AUTO: 11.3 FL (ref 8.9–12.7)
PMV BLD AUTO: 11.3 FL (ref 8.9–12.7)
PO2 BLDV: 110 MM HG (ref 35–45)
PO2 BLDV: 40.6 MM HG (ref 35–45)
POTASSIUM SERPL-SCNC: 3.8 MMOL/L (ref 3.5–5.3)
POTASSIUM SERPL-SCNC: 4.1 MMOL/L (ref 3.5–5.3)
PROCALCITONIN SERPL-MCNC: <0.05 NG/ML
PROT SERPL-MCNC: 7 G/DL (ref 6.4–8.4)
PROT SERPL-MCNC: 7.6 G/DL (ref 6.4–8.4)
PROT UR STRIP-MCNC: ABNORMAL MG/DL
RBC # BLD AUTO: 4.69 MILLION/UL (ref 3.88–5.62)
RBC # BLD AUTO: 5.02 MILLION/UL (ref 3.88–5.62)
RBC #/AREA URNS AUTO: NORMAL /HPF
SALICYLATES SERPL-MCNC: <5 MG/DL (ref 3–20)
SODIUM SERPL-SCNC: 139 MMOL/L (ref 135–147)
SODIUM SERPL-SCNC: 140 MMOL/L (ref 135–147)
SP GR UR STRIP.AUTO: >=1.03 (ref 1–1.03)
THC UR QL: POSITIVE
UROBILINOGEN UR QL STRIP.AUTO: 0.2 E.U./DL
VENT AC: 16
VENT- AC: AC
VT SETTING VENT: 500 ML
WBC # BLD AUTO: 27.36 THOUSAND/UL (ref 4.31–10.16)
WBC # BLD AUTO: 27.62 THOUSAND/UL (ref 4.31–10.16)
WBC #/AREA URNS AUTO: NORMAL /HPF

## 2024-10-06 PROCEDURE — 82805 BLOOD GASES W/O2 SATURATION: CPT | Performed by: EMERGENCY MEDICINE

## 2024-10-06 PROCEDURE — 87040 BLOOD CULTURE FOR BACTERIA: CPT | Performed by: EMERGENCY MEDICINE

## 2024-10-06 PROCEDURE — 99285 EMERGENCY DEPT VISIT HI MDM: CPT

## 2024-10-06 PROCEDURE — 95715 VEEG EA 12-26HR INTMT MNTR: CPT

## 2024-10-06 PROCEDURE — 80053 COMPREHEN METABOLIC PANEL: CPT | Performed by: EMERGENCY MEDICINE

## 2024-10-06 PROCEDURE — 72125 CT NECK SPINE W/O DYE: CPT

## 2024-10-06 PROCEDURE — 85025 COMPLETE CBC W/AUTO DIFF WBC: CPT | Performed by: EMERGENCY MEDICINE

## 2024-10-06 PROCEDURE — 99291 CRITICAL CARE FIRST HOUR: CPT | Performed by: EMERGENCY MEDICINE

## 2024-10-06 PROCEDURE — 5A1935Z RESPIRATORY VENTILATION, LESS THAN 24 CONSECUTIVE HOURS: ICD-10-PCS | Performed by: INTERNAL MEDICINE

## 2024-10-06 PROCEDURE — 95700 EEG CONT REC W/VID EEG TECH: CPT

## 2024-10-06 PROCEDURE — 99291 CRITICAL CARE FIRST HOUR: CPT | Performed by: INTERNAL MEDICINE

## 2024-10-06 PROCEDURE — 80307 DRUG TEST PRSMV CHEM ANLYZR: CPT | Performed by: EMERGENCY MEDICINE

## 2024-10-06 PROCEDURE — 71045 X-RAY EXAM CHEST 1 VIEW: CPT

## 2024-10-06 PROCEDURE — 82330 ASSAY OF CALCIUM: CPT

## 2024-10-06 PROCEDURE — 70450 CT HEAD/BRAIN W/O DYE: CPT

## 2024-10-06 PROCEDURE — 94002 VENT MGMT INPAT INIT DAY: CPT

## 2024-10-06 PROCEDURE — 80143 DRUG ASSAY ACETAMINOPHEN: CPT | Performed by: EMERGENCY MEDICINE

## 2024-10-06 PROCEDURE — 80179 DRUG ASSAY SALICYLATE: CPT | Performed by: EMERGENCY MEDICINE

## 2024-10-06 PROCEDURE — 93005 ELECTROCARDIOGRAM TRACING: CPT

## 2024-10-06 PROCEDURE — 85025 COMPLETE CBC W/AUTO DIFF WBC: CPT

## 2024-10-06 PROCEDURE — 84100 ASSAY OF PHOSPHORUS: CPT

## 2024-10-06 PROCEDURE — 96374 THER/PROPH/DIAG INJ IV PUSH: CPT

## 2024-10-06 PROCEDURE — 80053 COMPREHEN METABOLIC PANEL: CPT

## 2024-10-06 PROCEDURE — 36415 COLL VENOUS BLD VENIPUNCTURE: CPT | Performed by: EMERGENCY MEDICINE

## 2024-10-06 PROCEDURE — 81001 URINALYSIS AUTO W/SCOPE: CPT | Performed by: EMERGENCY MEDICINE

## 2024-10-06 PROCEDURE — 83520 IMMUNOASSAY QUANT NOS NONAB: CPT | Performed by: EMERGENCY MEDICINE

## 2024-10-06 PROCEDURE — 96375 TX/PRO/DX INJ NEW DRUG ADDON: CPT

## 2024-10-06 PROCEDURE — 94760 N-INVAS EAR/PLS OXIMETRY 1: CPT

## 2024-10-06 PROCEDURE — 96361 HYDRATE IV INFUSION ADD-ON: CPT

## 2024-10-06 PROCEDURE — 31500 INSERT EMERGENCY AIRWAY: CPT | Performed by: EMERGENCY MEDICINE

## 2024-10-06 PROCEDURE — 82077 ASSAY SPEC XCP UR&BREATH IA: CPT | Performed by: EMERGENCY MEDICINE

## 2024-10-06 PROCEDURE — 83605 ASSAY OF LACTIC ACID: CPT | Performed by: EMERGENCY MEDICINE

## 2024-10-06 PROCEDURE — 84484 ASSAY OF TROPONIN QUANT: CPT | Performed by: EMERGENCY MEDICINE

## 2024-10-06 PROCEDURE — 83735 ASSAY OF MAGNESIUM: CPT

## 2024-10-06 PROCEDURE — 82805 BLOOD GASES W/O2 SATURATION: CPT

## 2024-10-06 PROCEDURE — 80203 DRUG SCREEN QUANT ZONISAMIDE: CPT | Performed by: EMERGENCY MEDICINE

## 2024-10-06 PROCEDURE — 0BH17EZ INSERTION OF ENDOTRACHEAL AIRWAY INTO TRACHEA, VIA NATURAL OR ARTIFICIAL OPENING: ICD-10-PCS | Performed by: INTERNAL MEDICINE

## 2024-10-06 PROCEDURE — 84145 PROCALCITONIN (PCT): CPT | Performed by: EMERGENCY MEDICINE

## 2024-10-06 PROCEDURE — 82550 ASSAY OF CK (CPK): CPT | Performed by: EMERGENCY MEDICINE

## 2024-10-06 PROCEDURE — NC001 PR NO CHARGE: Performed by: STUDENT IN AN ORGANIZED HEALTH CARE EDUCATION/TRAINING PROGRAM

## 2024-10-06 PROCEDURE — 99254 IP/OBS CNSLTJ NEW/EST MOD 60: CPT | Performed by: PSYCHIATRY & NEUROLOGY

## 2024-10-06 PROCEDURE — 0T9B70Z DRAINAGE OF BLADDER WITH DRAINAGE DEVICE, VIA NATURAL OR ARTIFICIAL OPENING: ICD-10-PCS | Performed by: INTERNAL MEDICINE

## 2024-10-06 RX ORDER — POLYETHYLENE GLYCOL 3350 17 G/17G
17 POWDER, FOR SOLUTION ORAL DAILY
Status: DISCONTINUED | OUTPATIENT
Start: 2024-10-06 | End: 2024-10-08 | Stop reason: HOSPADM

## 2024-10-06 RX ORDER — ENOXAPARIN SODIUM 100 MG/ML
40 INJECTION SUBCUTANEOUS DAILY
Status: DISCONTINUED | OUTPATIENT
Start: 2024-10-06 | End: 2024-10-06

## 2024-10-06 RX ORDER — ZONISAMIDE 100 MG/1
300 CAPSULE ORAL DAILY
Status: DISCONTINUED | OUTPATIENT
Start: 2024-10-06 | End: 2024-10-07

## 2024-10-06 RX ORDER — CHLORHEXIDINE GLUCONATE ORAL RINSE 1.2 MG/ML
15 SOLUTION DENTAL EVERY 12 HOURS SCHEDULED
Status: DISCONTINUED | OUTPATIENT
Start: 2024-10-06 | End: 2024-10-06

## 2024-10-06 RX ORDER — ETOMIDATE 2 MG/ML
20 INJECTION INTRAVENOUS ONCE
Status: COMPLETED | OUTPATIENT
Start: 2024-10-06 | End: 2024-10-06

## 2024-10-06 RX ORDER — LEVETIRACETAM 500 MG/5ML
1000 INJECTION, SOLUTION, CONCENTRATE INTRAVENOUS ONCE
Status: COMPLETED | OUTPATIENT
Start: 2024-10-06 | End: 2024-10-06

## 2024-10-06 RX ORDER — ROCURONIUM BROMIDE 10 MG/ML
100 INJECTION, SOLUTION INTRAVENOUS ONCE
Status: COMPLETED | OUTPATIENT
Start: 2024-10-06 | End: 2024-10-06

## 2024-10-06 RX ORDER — FENTANYL CITRATE/PF 50 MCG/ML
SYRINGE (ML) INJECTION
Status: COMPLETED
Start: 2024-10-06 | End: 2024-10-06

## 2024-10-06 RX ORDER — ZONISAMIDE 100 MG/1
300 CAPSULE ORAL DAILY
Status: DISCONTINUED | OUTPATIENT
Start: 2024-10-06 | End: 2024-10-06

## 2024-10-06 RX ORDER — PROPOFOL 10 MG/ML
5-70 INJECTION, EMULSION INTRAVENOUS
Status: DISCONTINUED | OUTPATIENT
Start: 2024-10-06 | End: 2024-10-06

## 2024-10-06 RX ORDER — FENTANYL CITRATE 50 UG/ML
50 INJECTION, SOLUTION INTRAMUSCULAR; INTRAVENOUS
Status: DISCONTINUED | OUTPATIENT
Start: 2024-10-06 | End: 2024-10-06

## 2024-10-06 RX ORDER — LEVETIRACETAM 750 MG/1
1500 TABLET ORAL EVERY 12 HOURS SCHEDULED
Status: DISCONTINUED | OUTPATIENT
Start: 2024-10-07 | End: 2024-10-08 | Stop reason: HOSPADM

## 2024-10-06 RX ORDER — LEVETIRACETAM 500 MG/5ML
2000 INJECTION, SOLUTION, CONCENTRATE INTRAVENOUS ONCE
Status: COMPLETED | OUTPATIENT
Start: 2024-10-06 | End: 2024-10-06

## 2024-10-06 RX ORDER — PROPOFOL 10 MG/ML
5-50 INJECTION, EMULSION INTRAVENOUS
Status: DISCONTINUED | OUTPATIENT
Start: 2024-10-06 | End: 2024-10-06 | Stop reason: HOSPADM

## 2024-10-06 RX ORDER — CHLORHEXIDINE GLUCONATE ORAL RINSE 1.2 MG/ML
15 SOLUTION DENTAL EVERY 12 HOURS SCHEDULED
Status: DISCONTINUED | OUTPATIENT
Start: 2024-10-06 | End: 2024-10-08 | Stop reason: HOSPADM

## 2024-10-06 RX ORDER — ACETAMINOPHEN 325 MG/1
650 TABLET ORAL EVERY 6 HOURS PRN
Status: DISCONTINUED | OUTPATIENT
Start: 2024-10-06 | End: 2024-10-08 | Stop reason: HOSPADM

## 2024-10-06 RX ORDER — FENTANYL CITRATE 50 UG/ML
1 INJECTION, SOLUTION INTRAMUSCULAR; INTRAVENOUS ONCE
Status: COMPLETED | OUTPATIENT
Start: 2024-10-06 | End: 2024-10-06

## 2024-10-06 RX ORDER — MIDAZOLAM HYDROCHLORIDE 2 MG/2ML
5 INJECTION, SOLUTION INTRAMUSCULAR; INTRAVENOUS ONCE
Status: COMPLETED | OUTPATIENT
Start: 2024-10-06 | End: 2024-10-06

## 2024-10-06 RX ORDER — DEXMEDETOMIDINE HYDROCHLORIDE 4 UG/ML
.1-1 INJECTION, SOLUTION INTRAVENOUS
Status: DISCONTINUED | OUTPATIENT
Start: 2024-10-06 | End: 2024-10-06

## 2024-10-06 RX ORDER — MIDAZOLAM HYDROCHLORIDE 2 MG/2ML
INJECTION, SOLUTION INTRAMUSCULAR; INTRAVENOUS
Status: COMPLETED
Start: 2024-10-06 | End: 2024-10-06

## 2024-10-06 RX ORDER — SENNOSIDES 8.8 MG/5ML
8.8 LIQUID ORAL
Status: DISCONTINUED | OUTPATIENT
Start: 2024-10-06 | End: 2024-10-08 | Stop reason: HOSPADM

## 2024-10-06 RX ORDER — LORAZEPAM 2 MG/ML
2 INJECTION INTRAMUSCULAR ONCE
Status: COMPLETED | OUTPATIENT
Start: 2024-10-06 | End: 2024-10-06

## 2024-10-06 RX ORDER — HEPARIN SODIUM 5000 [USP'U]/ML
5000 INJECTION, SOLUTION INTRAVENOUS; SUBCUTANEOUS EVERY 8 HOURS SCHEDULED
Status: DISCONTINUED | OUTPATIENT
Start: 2024-10-06 | End: 2024-10-08 | Stop reason: HOSPADM

## 2024-10-06 RX ADMIN — SODIUM CHLORIDE 1000 ML: 0.9 INJECTION, SOLUTION INTRAVENOUS at 01:37

## 2024-10-06 RX ADMIN — PROPOFOL 10 MCG/KG/MIN: 10 INJECTION, EMULSION INTRAVENOUS at 01:59

## 2024-10-06 RX ADMIN — POTASSIUM PHOSPHATE, MONOBASIC POTASSIUM PHOSPHATE, DIBASIC 30 MMOL: 224; 236 INJECTION, SOLUTION, CONCENTRATE INTRAVENOUS at 11:02

## 2024-10-06 RX ADMIN — HEPARIN SODIUM 5000 UNITS: 5000 INJECTION INTRAVENOUS; SUBCUTANEOUS at 21:05

## 2024-10-06 RX ADMIN — PROPOFOL 60 MCG/KG/MIN: 10 INJECTION, EMULSION INTRAVENOUS at 11:40

## 2024-10-06 RX ADMIN — FENTANYL CITRATE 50 MCG: 50 INJECTION, SOLUTION INTRAMUSCULAR; INTRAVENOUS at 07:27

## 2024-10-06 RX ADMIN — CHLORHEXIDINE GLUCONATE 0.12% ORAL RINSE 15 ML: 1.2 LIQUID ORAL at 21:05

## 2024-10-06 RX ADMIN — PROPOFOL 70 MCG/KG/MIN: 10 INJECTION, EMULSION INTRAVENOUS at 08:44

## 2024-10-06 RX ADMIN — FENTANYL CITRATE 50 MCG: 50 INJECTION INTRAMUSCULAR; INTRAVENOUS at 07:27

## 2024-10-06 RX ADMIN — POLYETHYLENE GLYCOL 3350 17 G: 17 POWDER, FOR SOLUTION ORAL at 09:12

## 2024-10-06 RX ADMIN — ROCURONIUM BROMIDE 100 MG: 10 INJECTION, SOLUTION INTRAVENOUS at 01:38

## 2024-10-06 RX ADMIN — LORAZEPAM 2 MG: 2 INJECTION INTRAMUSCULAR; INTRAVENOUS at 01:35

## 2024-10-06 RX ADMIN — DEXMEDETOMIDINE HYDROCHLORIDE 0.2 MCG/KG/HR: 400 INJECTION INTRAVENOUS at 10:53

## 2024-10-06 RX ADMIN — MIDAZOLAM HYDROCHLORIDE 5 MG: 2 INJECTION, SOLUTION INTRAMUSCULAR; INTRAVENOUS at 04:43

## 2024-10-06 RX ADMIN — LEVETIRACETAM 1000 MG: 500 INJECTION, SOLUTION, CONCENTRATE INTRAVENOUS at 01:50

## 2024-10-06 RX ADMIN — CHLORHEXIDINE GLUCONATE 0.12% ORAL RINSE 15 ML: 1.2 LIQUID ORAL at 08:57

## 2024-10-06 RX ADMIN — HEPARIN SODIUM 5000 UNITS: 5000 INJECTION INTRAVENOUS; SUBCUTANEOUS at 13:32

## 2024-10-06 RX ADMIN — HEPARIN SODIUM 5000 UNITS: 5000 INJECTION INTRAVENOUS; SUBCUTANEOUS at 08:57

## 2024-10-06 RX ADMIN — ZONISAMIDE 300 MG: 100 CAPSULE ORAL at 09:10

## 2024-10-06 RX ADMIN — LEVETIRACETAM 2000 MG: 100 INJECTION, SOLUTION INTRAVENOUS at 04:19

## 2024-10-06 RX ADMIN — PROPOFOL 70 MCG/KG/MIN: 10 INJECTION, EMULSION INTRAVENOUS at 07:18

## 2024-10-06 RX ADMIN — MIDAZOLAM 5 MG: 1 INJECTION INTRAMUSCULAR; INTRAVENOUS at 04:43

## 2024-10-06 RX ADMIN — ETOMIDATE 20 MG: 2 INJECTION, SOLUTION INTRAVENOUS at 01:38

## 2024-10-06 NOTE — RESPIRATORY THERAPY NOTE
RT Ventilator Management Note  Tim Alanis  24 y.o. male MRN: [id number]  Unit/Bed#: ICU 03 Encounter: [id number]      Daily Screen    No data found in the last 10 encounters.           Physical Exam:   Assessment Type: Assess only  General Appearance: Sedated  Respiratory Pattern: Assisted  Chest Assessment: Chest expansion symmetrical  Bilateral Breath Sounds: Diminished  Cough: Productive  Suction: Oral, ET Tube  O2 Device: Ventilator      Resp Comments: Pt reamins on previous settings

## 2024-10-06 NOTE — EMTALA/ACUTE CARE TRANSFER
[facility] EMERGENCY DEPARTMENT  [address] [address]  Dept: [phone]      EMTALA TRANSFER CONSENT    NAME Tim Alanis Jr.                                         2000                              MRN [id number]    I have been informed of my rights regarding examination, treatment, and transfer   by Dr. Richard Cormier MD    Benefits: Specialized equipment and/or services available at the receiving facility (Include comment)________________________    Risks: Potential for delay in receiving treatment, Potential deterioration of medical condition, Loss of IV, Increased discomfort during transfer, Possible worsening of condition or death during transfer      Consent for Transfer:  I acknowledge that my medical condition has been evaluated and explained to me by the emergency department physician or other qualified medical person and/or my attending physician, who has recommended that I be transferred to the service of  Accepting Physician: Dr. Ventura at Accepting Facility Name, City & State : [facility] [place]. The above potential benefits of such transfer, the potential risks associated with such transfer, and the probable risks of not being transferred have been explained to me, and I fully understand them.  The doctor has explained that, in my case, the benefits of transfer outweigh the risks.  I agree to be transferred.    I authorize the performance of emergency medical procedures and treatments upon me in both transit and upon arrival at the receiving facility.  Additionally, I authorize the release of any and all medical records to the receiving facility and request they be transported with me, if possible.  I understand that the safest mode of transportation during a medical emergency is an ambulance and that the Hospital advocates the use of this mode of transport. Risks of traveling to the receiving facility by car, including absence of medical control, life sustaining  equipment, such as oxygen, and medical personnel has been explained to me and I fully understand them.    (PEDRO PABLO CORRECT BOX BELOW)  [  ]  I consent to the stated transfer and to be transported by ambulance/helicopter.  [  ]  I consent to the stated transfer, but refuse transportation by ambulance and accept full responsibility for my transportation by car.  I understand the risks of non-ambulance transfers and I exonerate the Hospital and its staff from any deterioration in my condition that results from this refusal.    X___________________________________________    DATE  10/06/24  TIME________  Signature of patient or legally responsible individual signing on patient behalf           RELATIONSHIP TO PATIENT_________________________          Provider Certification    NAME Tim Alanis                                          2000                              MRN [id number]    A medical screening exam was performed on the above named patient.  Based on the examination:    Condition Necessitating Transfer The primary encounter diagnosis was Status epilepticus (HCC). A diagnosis of Respiratory failure (HCC) was also pertinent to this visit.    Patient Condition: The patient has been stabilized such that within reasonable medical probability, no material deterioration of the patient condition or the condition of the unborn child(colin) is likely to result from the transfer    Reason for Transfer: Level of Care needed not available at this facility    Transfer Requirements: Facility SLB   Space available and qualified personnel available for treatment as acknowledged by    Agreed to accept transfer and to provide appropriate medical treatment as acknowledged by       Dr. Ventura  Appropriate medical records of the examination and treatment of the patient are provided at the time of transfer   STAFF INITIAL WHEN COMPLETED _______  Transfer will be performed by qualified personnel from    and appropriate  transfer equipment as required, including the use of necessary and appropriate life support measures.    Provider Certification: I have examined the patient and explained the following risks and benefits of being transferred/refusing transfer to the patient/family:  General risk, such as traffic hazards, adverse weather conditions, rough terrain or turbulence, possible failure of equipment (including vehicle or aircraft), or consequences of actions of persons outside the control of the transport personnel, Unanticipated needs of medical equipment and personnel during transport, Risk of worsening condition, The possibility of a transport vehicle being unavailable      Based on these reasonable risks and benefits to the patient and/or the unborn child(colin), and based upon the information available at the time of the patient’s examination, I certify that the medical benefits reasonably to be expected from the provision of appropriate medical treatments at another medical facility outweigh the increasing risks, if any, to the individual’s medical condition, and in the case of labor to the unborn child, from effecting the transfer.    X____________________________________________ DATE 10/06/24        TIME_______      ORIGINAL - SEND TO MEDICAL RECORDS   COPY - SEND WITH PATIENT DURING TRANSFER

## 2024-10-06 NOTE — ASSESSMENT & PLAN NOTE
Possibly due to back-to-back convulsive seizures.  - Cannot exclude some degree of aspiration pneumonia however would not expect leukocytosis to evolve so quickly.  - Recommend continue to trend WBC  - If leukocytosis persists, will consider additional infectious screen/workup.  - Will defer additional workup and management to critical care team.

## 2024-10-06 NOTE — PLAN OF CARE
Problem: PAIN - ADULT  Goal: Verbalizes/displays adequate comfort level or baseline comfort level  Description: Interventions:  - Encourage patient to monitor pain and request assistance  - Assess pain using appropriate pain scale  - Administer analgesics based on type and severity of pain and evaluate response  - Implement non-pharmacological measures as appropriate and evaluate response  - Consider cultural and social influences on pain and pain management  - Notify physician/advanced practitioner if interventions unsuccessful or patient reports new pain  Outcome: Not Progressing     Problem: INFECTION - ADULT  Goal: Absence or prevention of progression during hospitalization  Description: INTERVENTIONS:  - Assess and monitor for signs and symptoms of infection  - Monitor lab/diagnostic results  - Monitor all insertion sites, i.e. indwelling lines, tubes, and drains  - Monitor endotracheal if appropriate and nasal secretions for changes in amount and color  - [place] appropriate cooling/warming therapies per order  - Administer medications as ordered  - Instruct and encourage patient and family to use good hand hygiene technique  - Identify and instruct in appropriate isolation precautions for identified infection/condition  Outcome: Not Progressing  Goal: Absence of fever/infection during neutropenic period  Description: INTERVENTIONS:  - Monitor WBC    Outcome: Not Progressing     Problem: SAFETY ADULT  Goal: Patient will remain free of falls  Description: INTERVENTIONS:  - Educate patient/family on patient safety including physical limitations  - Instruct patient to call for assistance with activity   - Consult OT/PT to assist with strengthening/mobility   - Keep Call bell within reach  - Keep bed low and locked with side rails adjusted as appropriate  - Keep care items and personal belongings within reach  - Initiate and maintain comfort rounds  - Make Fall Risk Sign visible to staff  - Offer Toileting  every 2 Hours, in advance of need  - Initiate/Maintain bed alarm  - Obtain necessary fall risk management equipment: bed alarm  - Apply yellow socks and bracelet for high fall risk patients  - Consider moving patient to room near nurses station  Outcome: Not Progressing  Goal: Maintain or return to baseline ADL function  Description: INTERVENTIONS:  -  Assess patient's ability to carry out ADLs; assess patient's baseline for ADL function and identify physical deficits which impact ability to perform ADLs (bathing, care of mouth/teeth, toileting, grooming, dressing, etc.)  - Assess/evaluate cause of self-care deficits   - Assess range of motion  - Assess patient's mobility; develop plan if impaired  - Assess patient's need for assistive devices and provide as appropriate  - Encourage maximum independence but intervene and supervise when necessary  - Involve family in performance of ADLs  - Assess for home care needs following discharge   - Consider OT consult to assist with ADL evaluation and planning for discharge  - Provide patient education as appropriate  Outcome: Not Progressing  Goal: Maintains/Returns to pre admission functional level  Description: INTERVENTIONS:  - Perform AM-PAC 6 Click Basic Mobility/ Daily Activity assessment daily.  - Set and communicate daily mobility goal to care team and patient/family/caregiver.   - Collaborate with rehabilitation services on mobility goals if consulted  - Perform Range of Motion 3 times a day.  - Reposition patient every 2 hours.  - Dangle patient 3 times a day  - Stand patient 3 times a day  - Ambulate patient 3 times a day  - Out of bed to chair 3 times a day   - Out of bed for meals 3 times a day  - Out of bed for toileting  - Record patient progress and toleration of activity level   Outcome: Not Progressing     Problem: DISCHARGE PLANNING  Goal: Discharge to home or other facility with appropriate resources  Description: INTERVENTIONS:  - Identify barriers to  discharge w/patient and caregiver  - Arrange for needed discharge resources and transportation as appropriate  - Identify discharge learning needs (meds, wound care, etc.)  - Arrange for interpretive services to assist at discharge as needed  - Refer to Case Management Department for coordinating discharge planning if the patient needs post-hospital services based on physician/advanced practitioner order or complex needs related to functional status, cognitive ability, or social support system  Outcome: Not Progressing     Problem: Knowledge Deficit  Goal: Patient/family/caregiver demonstrates understanding of disease process, treatment plan, medications, and discharge instructions  Description: Complete learning assessment and assess knowledge base.  Interventions:  - Provide teaching at level of understanding  - Provide teaching via preferred learning methods  Outcome: Not Progressing     Problem: SAFETY,RESTRAINT: NV/NON-SELF DESTRUCTIVE BEHAVIOR  Goal: Remains free of harm/injury (restraint for non violent/non self-detsructive behavior)  Description: INTERVENTIONS:  - Instruct patient/family regarding restraint use   - Assess and monitor physiologic and psychological status   - Provide interventions and comfort measures to meet assessed patient needs   - Identify and implement measures to help patient regain control  - Assess readiness for release of restraint   Outcome: Not Progressing  Goal: Returns to optimal restraint-free functioning  Description: INTERVENTIONS:  - Assess the patient's behavior and symptoms that indicate continued need for restraint  - Identify and implement measures to help patient regain control  - Assess readiness for release of restraint   Outcome: Not Progressing     Problem: Potential for Falls  Goal: Patient will remain free of falls  Description: INTERVENTIONS:  - Educate patient/family on patient safety including physical limitations  - Instruct patient to call for assistance with  activity   - Consult OT/PT to assist with strengthening/mobility   - Keep Call bell within reach  - Keep bed low and locked with side rails adjusted as appropriate  - Keep care items and personal belongings within reach  - Initiate and maintain comfort rounds  - Make Fall Risk Sign visible to staff  - Offer Toileting every 2 Hours, in advance of need  - Initiate/Maintain bed alarm  - Obtain necessary fall risk management equipment: bed alarm  - Apply yellow socks and bracelet for high fall risk patients  - Consider moving patient to room near nurses station  Outcome: Not Progressing

## 2024-10-06 NOTE — ASSESSMENT & PLAN NOTE
Likely secondary to medication noncompliance and polysubstance abuse (methamphetamines and THC).  -Will continue video EEG for at least 24 hours.  - Continue home doses of Keppra 1500 mg twice a day and Zonegran 300 mg daily.  - Okay to continue weaning sedation with goal to extubate.  - Would keep 4 point restraints on until patient is able to consistently follow commands.  -No clear need for additional neuroimaging at this time.

## 2024-10-06 NOTE — CONSULTS
Consultation - Neurology   Name: Tim Alanis Jr. 24 y.o. male I MRN: [id number]  Unit/Bed#: ICU 03 I Date of Admission: 10/6/2024   Date of Service: 10/6/2024 I Hospital Day: 0   Inpatient consult to Neurology  Consult performed by: Sanket Smiley DO  Consult ordered by: Russell Crenshaw PA-C        Physician Requesting Evaluation: Joon Ventura MD   Reason for Evaluation / Principal Problem: Status epilepticus    Assessment & Plan  Status epilepticus (HCC)  Likely secondary to medication noncompliance and polysubstance abuse (methamphetamines and THC).  -Will continue video EEG for at least 24 hours.  - Continue home doses of Keppra 1500 mg twice a day and Zonegran 300 mg daily.  - Okay to continue weaning sedation with goal to extubate.  - Would keep 4 point restraints on until patient is able to consistently follow commands.  -No clear need for additional neuroimaging at this time.    Leukocytosis  Possibly due to back-to-back convulsive seizures.  - Cannot exclude some degree of aspiration pneumonia however would not expect leukocytosis to evolve so quickly.  - Recommend continue to trend WBC  - If leukocytosis persists, will consider additional infectious screen/workup.  - Will defer additional workup and management to critical care team.  I have discussed the above management plan in detail with the primary service.     Patient has established care with [facility] neurology, can continue to follow.  Previous [facility] neurology notes mentioned the possibility of establishing care with [facility] due to geographic proximity.  If this is the case, please let neurology know and office will contact patient to arrange outpatient follow-up.    History of Present Illness   Tim Alanis Jr. is a 24 y.o. (handedness not determinable) male with epilepsy maintained on Keppra 1500 mg twice daily and zonisamide 300 mg daily, multiple ED visits in both [facility] and [facility] for breakthrough  seizures usually in the clinical context of medication noncompliance and substance abuse (methamphetamine/amphetamine and THC ) who presents with status epilepticus.  ED note reports multiple seizures throughout the day, including 2 back-to-back seizures the night of admission without return to baseline fitting criteria for status epilepticus.  - Unknown compliance with medications.  Patient has been seen in the ER for breakthrough seizures before in the setting of medication noncompliance and polysubstance abuse.  - Patient was intubated for airway protection after a third seizure and subsequent lorazepam and Keppra.  - Present transferred to [facility] for continuous video EEG monitoring.    AEDs in ER:  -Patient given Keppra 1 g at 1:50 AM, 2 g at 4:19 AM  - Lorazepam 2 mg at 1:35 AM,  - Versed 5 mg at 4:43 AM  - Propofol started at 1:59 AM  - Precedex started at 10:53 AM    -.  AVSS since arrival  - CT head in the ER unremarkable.  - Venous pH 6.9 shortly after arrival, improved to 7.2 5 hours later  - Glucose mildly elevated at 260 on arrival, normalized 5 hours later.  - Lactic acid 19.2 on arrival, normalized 2 hours later  - WBC elevated at 27K, remains elevated at 27K 5-1/2 hours later, both with neutrophilic predominance.  - CMP and CBC otherwise unremarkable.  - UA unremarkable.  - UDS positive for amphetamines/methamphetamines and THC  - Zonegran level sent at 1:58 AM   - Keppra level sent at 1:38 AM  (Both level sent prior to any additional medications given)      Review of Systems   Unable to perform ROS: Intubated      Unable to review due to intubation and sedation  I have reviewed the patient's PMH, PSH, Social History, Family History, Meds, and Allergies    Objective :  Temp:  [97 °F (36.1 °C)-99 °F (37.2 °C)] 99 °F (37.2 °C)  HR:  [] 78  BP: (108-199)/() 108/69  Resp:  [15-24] 16  SpO2:  [97 %-100 %] 100 %  O2 Device: Ventilator    Physical Exam  Constitutional:       General: He is in acute  distress.      Appearance: Normal appearance. He is well-developed.   HENT:      Head: Normocephalic and atraumatic.      Right Ear: External ear normal.      Left Ear: External ear normal.      Nose: Nose normal.   Eyes:      General: No scleral icterus.     Conjunctiva/sclera: Conjunctivae normal.   Cardiovascular:      Rate and Rhythm: Normal rate and regular rhythm.   Pulmonary:      Effort: No respiratory distress.      Breath sounds: No stridor. No wheezing or rhonchi.   Abdominal:      General: Abdomen is flat.   Musculoskeletal:         General: No swelling or deformity. Normal range of motion.   Skin:     Coloration: Skin is not jaundiced or pale.      Findings: No erythema or rash.     Neurological Exam  Mental Status  Responsive to painful stimuli.  Sedated and intubated, there is some grimacing to deep noxious stim.  RN reports purposeful movements and withdrawal to noxious stim in all 4 extremities when sedation is held..    Cranial Nerves  Limited by sedation.  -Subtle corneal reflex intact.  -Subtle reactive pupils bilaterally and symmetric.  - Gaze is slightly disconjugate but likely due to sedation.  - No gross facial asymmetry noted.    Motor    No spontaneous motor noted.  - Flaccid all 4 extremities.  -Some slight withdrawal to deep noxious stim in both upper extremities.    Sensory  Limited by sedation.  -Slight withdrawal to deep noxious stim in both upper extremities.    Reflexes  Absent, suspect due to sedation.    Coordination    Unable to assess due to sedation.    Gait    Deferred.        Lab Results: I have reviewed the following results:I have personally reviewed pertinent reports.    Recent Labs     10/06/24  0700   WBC 27.36*   HGB 14.5   HCT 43.3      SODIUM 140   K 4.1      CO2 23   BUN 13   CREATININE 1.00   GLUC 81   CAIONIZED 1.13   MG 2.1   PHOS 2.0*     Imaging Results Review: I personally reviewed the following image studies in PACS and associated radiology  reports: MRI brain and CT head. My interpretation of the radiology images/reports is: Unremarkable for any acute contributory pathology.      VTE Prophylaxis: Sequential compression device (Venodyne)

## 2024-10-06 NOTE — CASE MANAGEMENT
Case Management Assessment & Discharge Planning Note    Patient name Tim Alanis Jr.  Location ICU 03/ICU 03 MRN [id number]  : 2000 Date 10/6/2024       Current Admission Date: 10/6/2024  Current Admission Diagnosis:Seizure (HCC)   Patient Active Problem List    Diagnosis Date Noted Date Diagnosed    Breakthrough seizure (HCC) 2024     Epileptic seizure, generalized (HCC) 2023     Lactic acidosis 2023     Leukocytosis 2023     Drug use 2023       LOS (days): 0  Geometric Mean LOS (GMLOS) (days):   Days to GMLOS:     OBJECTIVE:    Risk of Unplanned Readmission Score: 15.36         Current admission status: Inpatient       Preferred Pharmacy:   [id number] - [place], [place] - [address] [facility]  [address]  [place] [address]  Phone: [phone] Fax: [phone]    [facility] [address]  [address] [facility] [address]  Phone: [phone] Fax: [phone]    Primary Care Provider: No primary care provider on file.    Primary Insurance:   Secondary Insurance:     ASSESSMENT:  Active Health Care Proxies       Meghna Cardenas [facility] Care Representative - Mother   Primary Phone: [phone] (Mobile)                 Patient Information  Admitted from:: Home  Mental Status: Intubated  During Assessment patient was accompanied by: Not accompanied during assessment  Assessment information provided by:: Parent  Primary Caregiver: Self  Support Systems: Parent, Self, Friend  County of Residence: [facility]  What city do you live in?: [place]  Home entry access options. Select all that apply.: No steps to enter home  Type of Current Residence: 3 story home  Upon entering residence, is there a bedroom on the main floor (no further steps)?: Yes  Upon entering residence, is there a bathroom on the main floor (no further steps)?: No  Indicate which floors of current residence have a bathroom (select all  the apply):: Basement  Number of steps to basement from main floor: One Flight  Living Arrangements: Lives w/ Family members, Lives w/ Parent(s)  Is patient a ?: No    Activities of Daily Living Prior to Admission  Functional Status: Independent  Completes ADLs independently?: Yes  Ambulates independently?: Yes  Does patient use assisted devices?: No  Does patient currently own DME?: No  Does patient have a history of Outpatient Therapy (PT/OT)?: Yes  Does the patient have a history of Short-Term Rehab?: No  Does patient have a history of HHC?: No  Does patient currently have HHC?: No    Patient Information Continued  Income Source: Unknown  Does patient have prescription coverage?: Yes (mother states patient has state insurance)  Does patient receive dialysis treatments?: No  Does patient have a history of substance abuse?: Yes  Historical substance use preference: Other (+ THC, + meth)  Is patient currently in treatment for substance abuse?: Not appropriate at this time to discuss.  Does patient have a history of Mental Health Diagnosis?: No    Means of Transportation  Means of Transport to [facility]:: Family transport      Social Determinants of Health (SDOH)      Flowsheet Row Most Recent Value   Housing Stability    In the last 12 months, was there a time when you were not able to pay the mortgage or rent on time? N   In the past 12 months, how many times have you moved where you were living? 0   At any time in the past 12 months, were you homeless or living in a shelter (including now)? N   Transportation Needs    In the past 12 months, has lack of transportation kept you from medical appointments or from getting medications? no   In the past 12 months, has lack of transportation kept you from meetings, work, or from getting things needed for daily living? No   Food Insecurity    Within the past 12 months, you worried that your food would run out before you got the money to buy more. Never true   Within the  past 12 months, the food you bought just didn't last and you didn't have money to get more. Never true   Utilities    In the past 12 months has the electric, gas, oil, or water company threatened to shut off services in your home? No            DISCHARGE DETAILS:    Discharge planning discussed with:: Jazmine Gavin  [place] of Choice: Yes  Comments - Freedom of Choice: Discussed FOC  CM contacted family/caregiver?: Yes (mother jazmine)     This CM introduced self and role to mother.  Patient lives with mother in 3 story home, no DOMINGO, full flight of steps to get to basement, where bathroom is located, bedroom is on first floor.  Was IADLS, no DME at home, mother drives to appointments.  Mother states patient has history of drug use, current drug screen was + meth and THC.  CM to address HOST services with patient when extubated and able to communicate.

## 2024-10-06 NOTE — H&P
H&P - Critical Care/ICU   Name: Tim Alanis Jr. 24 y.o. male I MRN: [id number]  Unit/Bed#: ICU 03 I Date of Admission: 10/6/2024   Date of Service: 10/6/2024 I Hospital Day: 0     Assessment & Plan   Neuro:   Diagnosis: Status epilepticus, polysubstance use   Suspect etiology 2/2 medication non adherence and methamphetamine use  Plan  Restart home AEDs  Keppra 1,500mg Q12hrs  Zonisamide 300mg daily   Start vEEG  Q1 neuro checks  Diagnosis: Sedation, analgesia  Continue Propofol infusion, wean once on vEEG  Consider addition of Precedex as needed     CV:   No acute issues  Continuous cardiopulmonary monitoring  MAP goal >65    Pulm:  Diagnosis: Acute respiratory failure, intubated for airway protection in the setting of status epilepticus  Continue mechanical ventilation   16/500/6/40%  Wean for SAT/SBT with goals for extubation   Respiratory/airway clearance protocol  O2 sat goal >92%    GI:   Diagnosis: no acute issues  Bowel regimen: Miralax, Senna     :   Marion in place  Monitor Is/Os, renal indices   D/c marion when appropriate     F/E/N:   Fluids: no maintenance fluids   Electrolytes: Replete as needed, goal Mg >2, Phos >3, K>4  Nutrition: NPO    Heme/Onc:   Daignosis: DVT ppx  Heparin SQ  SCDs    Endo:   No acute issues    ID:   Diagnosis: Leukocytosis  Suspect in the setting of status epilepticus   Continue to monitor am CBC   Monitor temperature curve and WBC count    MSK/Skin:   Diagnosis: At risk for pressure injury  PT/OT when able   Skin surveillance   Frequent turns and repositioning   Disposition: Critical care    History of Present Illness     Tim Alanis Jr. is a 24 y.o. male with PMHx seizure disorder presented to [facility] ED earlier today for seizure. Treated with Ativan and Keppra. Required intubation after remaining in status epilepticus. Patient transferred to [facility] for vEEG and further care.     History obtained from chart review.  Review of Systems: See HPI for Review of Systems    Historical  Information   Past Medical History:  No date: Seizure (HCC) No past surgical history on file.   Current Outpatient Medications   Medication Instructions    levETIRAcetam (KEPPRA) 1,500 mg, Oral, Every 12 hours scheduled    zonisamide (ZONEGRAN) 300 mg, Oral, Daily    No Known Allergies   Social History     Tobacco Use    Smoking status: Former     Current packs/day: 1.00     Average packs/day: 1 pack/day for 5.0 years (5.0 ttl pk-yrs)     Types: Cigarettes    Smokeless tobacco: Never   Substance Use Topics    Alcohol use: Yes     Comment: sociall    Drug use: Not Currently     Types: Marijuana     Comment: per mother pt has history of meth amphetamine abuse    No family history on file.       Objective :                   Vitals I/O      Most Recent Min/Max in 24hrs   Temp 98 °F (36.7 °C) Temp  Min: 97 °F (36.1 °C)  Max: 98 °F (36.7 °C)   Pulse 88 Pulse  Min: 70  Max: 109   Resp 16 Resp  Min: 16  Max: 24   /66 BP  Min: 119/66  Max: 199/115   O2 Sat 97 % SpO2  Min: 97 %  Max: 100 %      Intake/Output Summary (Last 24 hours) at 10/6/2024 0807  Last data filed at 10/6/2024 0800  Gross per 24 hour   Intake --   Output 600 ml   Net -600 ml       Diet NPO    Invasive Monitoring           Physical Exam   Physical Exam  Vitals and nursing note reviewed.   Eyes:      Pupils: Pupils are equal, round, and reactive to light.   Skin:     General: Skin is warm and dry.   HENT:      Head: Normocephalic.      Comments: Skin abrasion over right eyebrow  Cardiovascular:      Rate and Rhythm: Normal rate and regular rhythm.      Pulses: Normal pulses.      Heart sounds: Normal heart sounds.   Musculoskeletal:      Right lower leg: No edema.      Left lower leg: No edema.   Abdominal: General: Bowel sounds are normal.      Palpations: Abdomen is soft.   Constitutional:       Appearance: He is well-nourished.      Interventions: He is sedated, intubated and restrained.   Pulmonary:      Effort: Pulmonary effort is normal. He is  intubated.      Breath sounds: Normal breath sounds.   Neurological:      General: No focal deficit present.        Cough reflex.      Comments: Thrashing all 4 extremities spontaneously, not following commands   Genitourinary/Anorectal:  Dong present.        Diagnostic Studies    CXR: ETT in proper position, no obvious pleural effusion or pneumothorax      Medications:  Scheduled PRN   chlorhexidine, 15 mL, Q12H DAYO  heparin (porcine), 5,000 Units, Q8H DAYO  [START ON 10/7/2024] levETIRAcetam, 1,500 mg, Q12H DAYO  zonisamide, 300 mg, Daily      fentaNYL, 50 mcg, Q1H PRN       Continuous    propofol, 5-70 mcg/kg/min, Last Rate: 70 mcg/kg/min (10/06/24 0718)         Labs:   CBC    Recent Labs     10/06/24  0138 10/06/24  0700   WBC 27.62* 27.36*   HGB 15.2 14.5   HCT 48.4 43.3    269     BMP    Recent Labs     10/06/24  0138 10/06/24  0700   SODIUM 139 140   K 3.8 4.1    108   CO2 8* 23   AGAP 30* 9   BUN 11 13   CREATININE 1.06 1.00   CALCIUM 9.3 8.8       Coags    No recent results     Additional Electrolytes  Recent Labs     10/06/24  0700   MG 2.1   PHOS 2.0*   CAIONIZED 1.13          Blood Gas    No recent results  Recent Labs     10/06/24  0700   PHVEN 7.290*   FUH2LAG 47.5   PO2VEN 40.6   LRC6RYX 22.3*   BEVEN -4.5   O4YPUAW 72.8    LFTs  Recent Labs     10/06/24  0138 10/06/24  0700   ALT 15 13   AST 22 20   ALKPHOS 65 65   ALB 5.2* 4.8   TBILI 0.29 0.44       Infectious  Recent Labs     10/06/24  0155   PROCALCITONI <0.05     Glucose  Recent Labs     10/06/24  0138 10/06/24  0700   GLUC 260* 81

## 2024-10-06 NOTE — RESPIRATORY THERAPY NOTE
RT Protocol Note  Tim Alanis JrVero 24 y.o. male MRN: [id number]  Unit/Bed#: ICU 03 Encounter: [id number]    Assessment    Active Problems:  There are no active Hospital Problems.      Home Pulmonary Medications:  N/A       Past Medical History:   Diagnosis Date    Seizure (HCC)      Social History     Socioeconomic History    Marital status: Single     Spouse name: Not on file    Number of children: Not on file    Years of education: Not on file    Highest education level: Not on file   Occupational History    Not on file   Tobacco Use    Smoking status: Former     Current packs/day: 1.00     Average packs/day: 1 pack/day for 5.0 years (5.0 ttl pk-yrs)     Types: Cigarettes    Smokeless tobacco: Never   Substance and Sexual Activity    Alcohol use: Yes     Comment: sociall    Drug use: Not Currently     Types: Marijuana     Comment: per mother pt has history of meth amphetamine abuse    Sexual activity: Yes     Birth control/protection: Condom Male   Other Topics Concern    Not on file   Social History Narrative    Not on file     Social Determinants of Health     Financial Resource Strain: Low Risk  (7/20/2023)    Received from [facility]    Overall Financial Resource Strain (CARDIA)   Food Insecurity: No Food Insecurity (2/14/2024)    Hunger Vital Sign     Worried About Running Out of Food in the Last Year: Never true     Ran Out of Food in the Last Year: Never true   Transportation Needs: No Transportation Needs (2/14/2024)    PRAPARE - Transportation     Lack of Transportation (Medical): No     Lack of Transportation (Non-Medical): No   Physical Activity: Not on file   Stress: No Stress Concern Present (7/20/2023)    [facility]    [employer] - Occupational Stress Questionnaire   Social Connections: Unknown (6/18/2024)    Received from [facility]    Social Connections     How often do you feel lonely or isolated from those around you? (Adult - for ages 18  years and over): Not on file   Intimate Partner Violence: Unknown (7/20/2023)    Received from [facility]    Humiliation, Afraid, Rape, and Kick questionnaire   Housing Stability: High Risk (2/14/2024)    Housing Stability Vital Sign     Unable to Pay for Housing in the Last Year: No     Number of Times Moved in the Last Year: 2     Homeless in the Last Year: No       Subjective         Objective    Physical Exam:   Assessment Type: Assess only  General Appearance: Eyes do not open to any stimulus  Respiratory Pattern: Assisted  Chest Assessment: Chest expansion symmetrical  Bilateral Breath Sounds: Diminished  Cough: Productive  Suction: Oral, ET Tube  O2 Device: Ventilator    Vitals:  SpO2 100%.          Imaging and other studies: Results Review Statement: No pertinent imaging studies reviewed.    O2 Device: Ventilator     Plan    Respiratory Plan: Vent/NIV/HFNC        Resp Comments: Pt. evaluarted at bedside per Respiratory protocol.  Pt. received as [facility] from [facility] intubated and sedated S/P seizures/Change in mental status.  Pt. placed on [place] G5 Ventilator on CMV/VC mode settings provided by EMS.  Pt's Breath sounds are diminished bilaterally at this time.  CXR results pending.  Pt. has no prior Pulmonary Hx. according to chart and bronchodilators are not indicated at this time.  Will continue to assess and monitor pt per Respiratory protocol.

## 2024-10-06 NOTE — PROGRESS NOTES
Progress Note - ICU Triage Asssessment   Tim Alanis  24 y.o. male MRN: [id number]  Time Called ( Time): 0240 AM  Date Called: 10/06/24  Room #: [facility] Room 7  Person requesting evaluation: Dr Cormier    Situation:    24 y.o. male, with a has a past medical history of Seizure (HCC). -- WHO PRESENTED to [facility] on 10/6/2024 w/ likely Seizure vs Status Epilepticus in the context of known convulsive epilepsy previously treated with Keppra and Zonisamide. -- NOW s/p intubation + Ativan and Keppra load.    Patient arrived to the ED and suffered a grand mal seizure requiring intubation. Previously had several seizures throughout the day and a prolong post-ictal period prior to EMS activation. Medication compliance is unclear.    Current Outpatient Medications on File Prior to Encounter   Medication Sig    levETIRAcetam (KEPPRA) 500 mg tablet Take 3 tablets (1,500 mg total) by mouth every 12 (twelve) hours    zonisamide (ZONEGRAN) 100 mg capsule Take 3 capsules (300 mg total) by mouth daily         --------------------------------------------------------  Triage Assessment & Plan:     24 y.o. male, with a has a past medical history of Seizure (HCC). -- WHO PRESENTED to [facility] on 10/6/2024 w/ likely Seizure vs Status Epilepticus in the context of known convulsive epilepsy previously treated with Keppra and Zonisamide. -- NOW s/p intubation + Ativan and Keppra load.      # Seizure vs Status Epilepticus  - Defer to epileotology vs Neuro Critical care regarding need for transfer for vEEG. If not requested or if not yet available then agree with admission to [facility] service.  - Agree with propofol if BP permits. If pressures drop, then would consider switching to midazolam infusion.  - Recommend a basic infectious workup eg procal/UA/blood culture/etc.  - Recommend obtaining a Keppra level since very elevated levels would be consistent with compliance at home plus additional dosing in the ED although normal  levels may be difficult to interpret in the context of recent loading.    # Dispo/GOC: Patient to be admitted to ICU level of care  Defer to epileptology and/or Neurocritical care regarding need for transfer for continuous vEEG monitoring.    Recommendations discussed with Dr. Cormier       --------------------------------------------------------  Selected Data:     Visit Vitals  BP (!) 175/86 (BP Location: Left arm)   Pulse 74   Temp (!) 97 °F (36.1 °C) (Temporal)   Resp 18   Wt 79 kg (174 lb 2.6 oz)   SpO2 99%   BMI 22.98 kg/m²   Smoking Status Former   BSA 2.03 m²   Temp (24hrs), Av °F (36.1 °C), Min:97 °F (36.1 °C), Max:97 °F (36.1 °C)    Intake/Output Summary (Last 24 hours) at 10/6/2024 0253  Last data filed at 10/6/2024 0237  Gross per 24 hour   Intake 1000 ml   Output 100 ml   Net 900 ml       Results from last 7 days   Lab Units 10/06/24  0155   LACTIC ACID mmol/L 19.2*     Results from last 7 days   Lab Units 10/06/24  0138   SODIUM mmol/L 139   POTASSIUM mmol/L 3.8   CHLORIDE mmol/L 101   CO2 mmol/L 8*   ANION GAP mmol/L 30*   BUN mg/dL 11   CREATININE mg/dL 1.06   GLUCOSE RANDOM mg/dL 260*     Results from last 7 days   Lab Units 10/06/24  0138   CALCIUM mg/dL 9.3     Results from last 7 days   Lab Units 10/06/24  0138   AST U/L 22   ALT U/L 15   ALK PHOS U/L 65   TOTAL BILIRUBIN mg/dL 0.29   ALBUMIN g/dL 5.2*     Results from last 7 days   Lab Units 10/06/24  0138   WBC Thousand/uL 27.62*   HEMOGLOBIN g/dL 15.2   HEMATOCRIT % 48.4   PLATELETS Thousands/uL 310         Results from last 7 days   Lab Units 10/06/24  0155   PROCALCITONIN ng/ml <0.05       --------------------------------------------------------  SIGNATURE: Santiago Magana MD PhD  [phone]  Anesthesiology and Critical Care  Staff Physician, [facility]  DATE: 2024  TIME: 2:53 AM

## 2024-10-06 NOTE — ED NOTES
Transported pt to CT scan accompanied by RN, respiratory and ED tech on the monitor at this time.      Lisa Salinas RN  10/06/24 0230

## 2024-10-06 NOTE — QUICK NOTE
Called and updated mother, Meghna. Discussed patient extubated, continues on video EEG without seizures, restarted home anti epileptic regimen. Will continue to monitor on vEEG. She was understanding of plan and very appreciative of phone call.

## 2024-10-06 NOTE — RESPIRATORY THERAPY NOTE
RT Ventilator Management Note  Tim Alanis Jr. 24 y.o. male MRN: [id number]  Unit/Bed#: ED 07 Encounter: [id number]      Daily Screen    No data found in the last 10 encounters.           Physical Exam:   Assessment Type: Assess only  General Appearance: Unresponsive  Respiratory Pattern: Assisted  Chest Assessment: Chest expansion symmetrical  Bilateral Breath Sounds: Diminished, Clear  O2 Device: vent      Resp Comments: 24 yr old pt, S/p altered mental status, seizure like activity, pt intubated by Dr. Cormier with 7.5 ET tube at 26 to the lip, (+) Etco2 change to yellow, BS equal, pt placed on vent in APVcmv 500/14/+6/40%, pt sync with vent settings, respiratory pattern unlabored.

## 2024-10-06 NOTE — ED PROVIDER NOTES
Final diagnoses:   Status epilepticus (HCC)   Respiratory failure (HCC)     ED Disposition       ED Disposition   Transfer to Another Facility-In Network    Condition   --    Date/Time   Sun Oct 6, 2024  4:11 AM    Comment                  Assessment & Plan       Medical Decision Making  0130: The patient has had some clustered seizures throughout the day, has been postictal and has had 2 seizures back-to-back this evening without returning to baseline, concerns for status epilepticus.  Unknown compliance of medications.  The patient has been seen in our emergency room for breakthrough seizures before.  No family member is present to give further history.  The patient had another tonic-clonic seizure shortly after arrival.  Decision was made to secure airway with endotracheal intubation.  The patient was given Ativan to hong the seizure here.  Loaded with Keppra 1 g.  Potential head injury.  Plan to complete CT head and CT cervical spine.  Check basic labs including tox screens.  Check EKG.  Blood glucose greater than 200.  Ultimately, plan to consult neurology and critical care for assistance with care.    0221: CTs, chest x-ray and labs reviewed.  Discussed with critical care for assistance.    0230: Critical care evaluated the patient, accepts for admission however would like to have consultation with neurology for potential video monitoring EEG.    0315: After discussion with neurology, they recommend transfer to [facility].    0350: Discussed with critical care Dr. Ventura, accepts for transfer.    Amount and/or Complexity of Data Reviewed  Labs: ordered.  Radiology: ordered.     Details: CT head--no intracranial hemorrhage  CT cervical spine--no fracture  Chest x-ray--no acute pathology, endotracheal tube in good position, OG tube in good position  ECG/medicine tests: ordered and independent interpretation performed.     Details: Normal sinus rhythm 92 bpm, normal intervals, no acute ischemia.  Discussion of  management or test interpretation with external provider(s): Dr. Cervantes neurology  Dr. Ventura ICU  Dr. Magana ICU, [facility] JEREZ    Risk  Prescription drug management.             Medications   propofol (DIPRIVAN) 1000 mg in 100 mL infusion (premix) (50 mcg/kg/min × 79 kg Intravenous Rate/Dose Change 10/6/24 0416)   sodium chloride 0.9 % bolus 1,000 mL (0 mL Intravenous Stopped 10/6/24 0237)   LORazepam (ATIVAN) injection 2 mg (2 mg Intravenous Given 10/6/24 0135)   levETIRAcetam (KEPPRA) injection 1,000 mg (1,000 mg Intravenous Given 10/6/24 0150)   ROCuronium (ZEMURON) injection 100 mg (100 mg Intravenous Given 10/6/24 0138)   etomidate (AMIDATE) 2 mg/mL injection 20 mg (20 mg Intravenous Given 10/6/24 0138)   levETIRAcetam (KEPPRA) injection 2,000 mg (2,000 mg Intravenous Given 10/6/24 0419)   midazolam (VERSED) injection 5 mg (5 mg Intravenous Given 10/6/24 0443)       ED Risk Strat Scores                                               History of Present Illness       Chief Complaint   Patient presents with    Seizure - Prior Hx Of       Past Medical History:   Diagnosis Date    Seizure (HCC)       History reviewed. No pertinent surgical history.   History reviewed. No pertinent family history.   Social History     Tobacco Use    Smoking status: Former     Current packs/day: 1.00     Average packs/day: 1 pack/day for 5.0 years (5.0 ttl pk-yrs)     Types: Cigarettes    Smokeless tobacco: Never   Substance Use Topics    Alcohol use: Yes     Comment: sociall    Drug use: Not Currently     Types: Marijuana     Comment: per mother pt has history of meth amphetamine abuse      E-Cigarette/Vaping      E-Cigarette/Vaping Substances      I have reviewed and agree with the history as documented.       History provided by:  EMS personnel and parent (Mother)  History limited by:  Patient unresponsive (Mother apparently gave EMS history, she apparently called EMS, mother is not currently present in the emergency room,  attempts to reach her by phone have been unsuccessful)  Seizure - Actively Seizing on Arrival  Seizure activity on arrival: Postictal for the past hour.    Seizure type:  Grand mal  Postictal symptoms: confusion, memory loss and somnolence    Return to baseline: no (The patient apparently had 5 seizures throughout the day today at times returning to baseline, EMS reports mother saw him strike the refrigerator with his head at 1 point today.  Has been postictal for the past hour.  This is when she called EMS.)    Severity:  Severe  Duration:  1 hour  Timing:  Clustered  Number of seizures this episode:  5  Progression:  Unchanged  Recent head injury:  During the event  PTA treatment:  None  History of seizures: yes (Supposed to be on Keppra and Zonegran per chart, unknown compliance)    Date of most recent prior episode:  1 month ago  Severity:  Severe  Seizure control level:  Poorly controlled  Current therapy:  Levetiracetam  Compliance with current therapy:  Variable      Review of Systems   Unable to perform ROS: Patient unresponsive           Objective       ED Triage Vitals [10/06/24 0133]   Temperature Pulse Blood Pressure Respirations SpO2 Patient Position - Orthostatic VS   (!) 97 °F (36.1 °C) 105 119/74 (!) 24 98 % Lying      Temp Source Heart Rate Source BP Location FiO2 (%) Pain Score    Temporal Monitor Left arm -- --      Vitals      Date and Time Temp Pulse SpO2 Resp BP Pain Score FACES Pain Rating User   10/06/24 0500 -- 102 100 % 16 131/78 -- -- AR   10/06/24 0445 -- 98 100 % 19 132/76 -- -- AR   10/06/24 0430 -- 93 100 % 23 119/66 -- -- AR   10/06/24 0415 -- 103 100 % 22 132/74 -- -- AR   10/06/24 0400 -- 98 98 % 19 151/81 -- -- AR   10/06/24 0345 -- 96 100 % 19 161/72 -- -- AR   10/06/24 0330 -- 109 100 % 23 177/91 -- -- AR   10/06/24 0315 -- 91 99 % 21 183/98 -- -- AR   10/06/24 0300 -- 74 99 % 20 172/85 -- -- AR   10/06/24 0238 -- 74 99 % 18 175/86 -- -- AR   10/06/24 0230 -- 71 99 % 24 150/70 --  -- AR   10/06/24 0212 -- 70 99 % 22 199/115 -- -- AR   10/06/24 0156 -- -- 99 % -- -- -- -- PM   10/06/24 0133 97 °F (36.1 °C) 105 98 % 24 119/74 -- --             Physical Exam  Vitals and nursing note reviewed.   Constitutional:       General: He is not in acute distress.     Appearance: He is not ill-appearing, toxic-appearing or diaphoretic.      Comments: Postictal, not answering questions correctly and not following commands, eyes open only with tactile stimulation   HENT:      Head: Normocephalic and atraumatic.      Right Ear: External ear normal.      Left Ear: External ear normal.      Nose: Nose normal. No congestion or rhinorrhea.      Mouth/Throat:      Mouth: Mucous membranes are moist.      Pharynx: Oropharynx is clear. No oropharyngeal exudate or posterior oropharyngeal erythema.      Comments: Small abrasion to the right tip of the tongue  Eyes:      General:         Right eye: No discharge.         Left eye: No discharge.      Pupils: Pupils are equal, round, and reactive to light.      Comments: Bilateral conjunctival injection   Neck:      Comments: No meningismus  Cardiovascular:      Rate and Rhythm: Normal rate and regular rhythm.      Pulses: Normal pulses.      Heart sounds: Normal heart sounds. No murmur heard.     No gallop.   Pulmonary:      Effort: Pulmonary effort is normal. No respiratory distress.      Breath sounds: Normal breath sounds. No stridor. No wheezing, rhonchi or rales.   Chest:      Chest wall: No tenderness.   Abdominal:      General: Bowel sounds are normal. There is no distension.      Palpations: Abdomen is soft. There is no mass.      Tenderness: There is no abdominal tenderness. There is no right CVA tenderness, left CVA tenderness, guarding or rebound.      Hernia: No hernia is present.   Musculoskeletal:         General: No swelling, tenderness or signs of injury.      Cervical back: Normal range of motion and neck supple. No rigidity or tenderness.  "  Lymphadenopathy:      Cervical: No cervical adenopathy.   Skin:     General: Skin is warm and dry.      Capillary Refill: Capillary refill takes less than 2 seconds.   Neurological:      General: No focal deficit present.      Comments: Postictal, lethargic, not cooperative with neuroexam         Results Reviewed       Procedure Component Value Units Date/Time    Lactic acid 2 Hours [[id number]]  (Abnormal) Collected: 10/06/24 0419    Lab Status: Final result Specimen: Blood from Arm, Left Updated: 10/06/24 0442     LACTIC ACID 3.3 mmol/L     Narrative:      Result may be elevated if tourniquet was used during collection.    Blood culture #2 [[id number]] Collected: 10/06/24 0248    Lab Status: In process Specimen: Blood from Arm, Left Updated: 10/06/24 0255    Blood culture #1 [[id number]] Collected: 10/06/24 0244    Lab Status: In process Specimen: Blood from Arm, Left Updated: 10/06/24 0254    Procalcitonin [[id number]]  (Normal) Collected: 10/06/24 0155    Lab Status: Final result Specimen: Blood from Arm, Right Updated: 10/06/24 0250     Procalcitonin <0.05 ng/ml     Acetaminophen level-\"If concentration is detectable, please discuss with medical  on call.\" [[id number]]  (Abnormal) Collected: 10/06/24 0155    Lab Status: Final result Specimen: Blood from Arm, Right Updated: 10/06/24 0240     Acetaminophen Level <2 ug/mL     Lactic acid, plasma (w/reflex if result > 2.0) [[id number]]  (Abnormal) Collected: 10/06/24 0155    Lab Status: Final result Specimen: Blood from Arm, Right Updated: 10/06/24 0232     LACTIC ACID 19.2 mmol/L     Narrative:      Result may be elevated if tourniquet was used during collection.    Urine Microscopic [[id number]]  (Normal) Collected: 10/06/24 0158    Lab Status: Final result Specimen: Urine, Clean Catch Updated: 10/06/24 0227     RBC, UA 0-5 /hpf      WBC, UA 0-5 /hpf      Epithelial Cells Occasional /hpf      Bacteria, UA Occasional /hpf     Salicylate level [[id number]]  " (Normal) Collected: 10/06/24 0155    Lab Status: Final result Specimen: Blood from Arm, Right Updated: 10/06/24 0221     Salicylate Lvl <5 mg/dL     Ethanol [[id number]]  (Normal) Collected: 10/06/24 0155    Lab Status: Final result Specimen: Blood from Arm, Right Updated: 10/06/24 0221     Ethanol Lvl <10 mg/dL     CK [[id number]]  (Abnormal) Collected: 10/06/24 0155    Lab Status: Final result Specimen: Blood from Arm, Right Updated: 10/06/24 0221     Total  U/L     Rapid drug screen, urine [[id number]]  (Abnormal) Collected: 10/06/24 0158    Lab Status: Final result Specimen: Urine, Clean Catch Updated: 10/06/24 0220     Amph/Meth UR Positive     Barbiturate Ur Negative     Benzodiazepine Urine Negative     Cocaine Urine Negative     Methadone Urine Negative     Opiate Urine Negative     PCP Ur Negative     THC Urine Positive     Oxycodone Urine Negative     Fentanyl Urine Negative     HYDROCODONE URINE Negative    Narrative:      Presumptive report. If requested, specimen will be sent to reference lab for confirmation.  FOR MEDICAL PURPOSES ONLY.   IF CONFIRMATION NEEDED PLEASE CONTACT THE LAB WITHIN 5 DAYS.    Drug Screen Cutoff Levels:  AMPHETAMINE/METHAMPHETAMINES  1000 ng/mL  BARBITURATES     200 ng/mL  BENZODIAZEPINES     200 ng/mL  COCAINE      300 ng/mL  METHADONE      300 ng/mL  OPIATES      300 ng/mL  PHENCYCLIDINE     25 ng/mL  THC       50 ng/mL  OXYCODONE      100 ng/mL  FENTANYL      5 ng/mL  HYDROCODONE     300 ng/mL    Blood gas, venous [[id number]]  (Abnormal) Collected: 10/06/24 0155    Lab Status: Final result Specimen: Blood from Arm, Right Updated: 10/06/24 0218     pH, Pradip 6.931     pCO2, Pradip 28.6 mm Hg      pO2, Pradip 110.0 mm Hg      HCO3, Pradip 5.9 mmol/L      Base Excess, Pradip -25.8 mmol/L      O2 Content, Pradip 21.9 ml/dL      O2 HGB, VENOUS 93.9 %     HS Troponin 0hr (reflex protocol) [[id number]]  (Normal) Collected: 10/06/24 0138    Lab Status: Final result Specimen: Blood from Arm, Left  Updated: 10/06/24 0214     hs TnI 0hr 7 ng/L     Comprehensive metabolic panel [[id number]]  (Abnormal) Collected: 10/06/24 0138    Lab Status: Final result Specimen: Blood from Arm, Left Updated: 10/06/24 0209     Sodium 139 mmol/L      Potassium 3.8 mmol/L      Chloride 101 mmol/L      CO2 8 mmol/L      ANION GAP 30 mmol/L      BUN 11 mg/dL      Creatinine 1.06 mg/dL      Glucose 260 mg/dL      Calcium 9.3 mg/dL      AST 22 U/L      ALT 15 U/L      Alkaline Phosphatase 65 U/L      Total Protein 7.6 g/dL      Albumin 5.2 g/dL      Total Bilirubin 0.29 mg/dL      eGFR 99 ml/min/1.73sq m     Narrative:      [employer] Kid[employer] guidelines for Chronic Kidney Disease (CKD):     Stage 1 with normal or high GFR (GFR > 90 mL/min/1.73 square meters)    Stage 2 Mild CKD (GFR = 60-89 mL/min/1.73 square meters)    Stage 3A Moderate CKD (GFR = 45-59 mL/min/1.73 square meters)    Stage 3B Moderate CKD (GFR = 30-44 mL/min/1.73 square meters)    Stage 4 Severe CKD (GFR = 15-29 mL/min/1.73 square meters)    Stage 5 End Stage CKD (GFR <15 mL/min/1.73 square meters)  Note: GFR calculation is accurate only with a steady state creatinine    UA w Reflex to Microscopic w Reflex to Culture [[id number]]  (Abnormal) Collected: 10/06/24 0158    Lab Status: Final result Specimen: Urine, Clean Catch Updated: 10/06/24 0209     Color, UA Yellow     Clarity, UA Clear     Specific Gravity, UA >=1.030     pH, UA 5.5     Leukocytes, UA Negative     Nitrite, UA Negative     Protein, UA 30 (1+) mg/dl      Glucose,  (1/10%) mg/dl      Ketones, UA Negative mg/dl      Urobilinogen, UA 0.2 E.U./dl      Bilirubin, UA Negative     Occult Blood, UA Moderate    Zonisamide level [[id number]] Collected: 10/06/24 0158    Lab Status: In process Specimen: Blood from Arm, Right Updated: 10/06/24 0202    CBC and differential [[id number]]  (Abnormal) Collected: 10/06/24 0138    Lab Status: Final result Specimen: Blood from Arm, Left Updated: 10/06/24  0146     WBC 27.62 Thousand/uL      RBC 5.02 Million/uL      Hemoglobin 15.2 g/dL      Hematocrit 48.4 %      MCV 96 fL      MCH 30.3 pg      MCHC 31.4 g/dL      RDW 12.2 %      MPV 11.3 fL      Platelets 310 Thousands/uL      nRBC 0 /100 WBCs      Segmented % 76 %      Immature Grans % 1 %      Lymphocytes % 14 %      Monocytes % 9 %      Eosinophils Relative 0 %      Basophils Relative 0 %      Absolute Neutrophils 20.97 Thousands/µL      Absolute Immature Grans 0.23 Thousand/uL      Absolute Lymphocytes 3.74 Thousands/µL      Absolute Monocytes 2.53 Thousand/µL      Eosinophils Absolute 0.06 Thousand/µL      Basophils Absolute 0.09 Thousands/µL     Levetiracetam level [[id number]] Collected: 10/06/24 0138    Lab Status: In process Specimen: Blood from Arm, Left Updated: 10/06/24 0143            XR chest 1 view portable    (Results Pending)   CT head without contrast    (Results Pending)   CT spine cervical without contrast    (Results Pending)       CriticalCare Time    Date/Time: 10/6/2024 1:53 AM    Performed by: Richard Cormier MD  Authorized by: Richard Cormier MD    Critical care provider statement:     Critical care time (minutes):  75    Critical care time was exclusive of:  Separately billable procedures and treating other patients    Critical care was necessary to treat or prevent imminent or life-threatening deterioration of the following conditions:  CNS failure or compromise and respiratory failure    Critical care was time spent personally by me on the following activities:  Ventilator management, review of old charts, re-evaluation of patient's condition, ordering and review of radiographic studies, ordering and review of laboratory studies, ordering and performing treatments and interventions, interpretation of cardiac output measurements, examination of patient, evaluation of patient's response to treatment, discussions with consultants, development of treatment plan with patient or  surrogate and obtaining history from patient or surrogate    I assumed direction of critical care for this patient from another provider in my specialty: no    Intubation    Date/Time: 10/6/2024 1:45 AM    Performed by: Richard Cormier MD  Authorized by: Richard Cormier MD    Patient location:  ED  Consent:     Consent obtained:  Emergent situation  Universal protocol:     Patient identity confirmed:  Verbally with patient, hospital-assigned identification number, arm band and provided demographic data  Pre-procedure details:     Patient status:  Unresponsive    Pretreatment medications:  Etomidate    Paralytics:  Rocuronium  Indications:     Indications for intubation: respiratory distress, respiratory failure, airway protection and hypoxemia    Procedure details:     Preoxygenation:  Nonrebreather mask    Intubation method:  Oral    Oral intubation technique:  Glidescope    Laryngoscope blade:  Mac 4    Tube size (mm):  7.5    Tube type:  Cuffed    Number of attempts:  1    Tube visualized through cords: yes    Placement assessment:     ETT to teeth:  26 cm    Tube secured with:  ETT vallecillo    Breath sounds:  Equal    Placement verification: chest rise, condensation, colorimetric ETCO2 device, CXR verification, direct visualization, equal breath sounds and tube exhalation      CXR findings:  ETT in proper place  Post-procedure details:     Patient tolerance of procedure:  Tolerated well, no immediate complications      ED Medication and Procedure Management   Prior to Admission Medications   Prescriptions Last Dose Informant Patient Reported? Taking?   levETIRAcetam (KEPPRA) 500 mg tablet   No No   Sig: Take 3 tablets (1,500 mg total) by mouth every 12 (twelve) hours   zonisamide (ZONEGRAN) 100 mg capsule   No No   Sig: Take 3 capsules (300 mg total) by mouth daily      Facility-Administered Medications: None     Patient's Medications   Discharge Prescriptions    No medications on file     No discharge  procedures on file.  ED SEPSIS DOCUMENTATION   Time reflects when diagnosis was documented in both MDM as applicable and the Disposition within this note       Time User Action Codes Description Comment    10/6/2024  2:19 AM Richard Cormier [G40.901] Status epilepticus (HCC)     10/6/2024  2:19 AM Richard Cormier [J96.90] Respiratory failure (HCC)                  Richard Cormier MD  10/06/24 0521

## 2024-10-07 ENCOUNTER — APPOINTMENT (OUTPATIENT)
Dept: RADIOLOGY | Facility: HOSPITAL | Age: 24
DRG: 053 | End: 2024-10-07
Payer: COMMERCIAL

## 2024-10-07 PROBLEM — N17.9 AKI (ACUTE KIDNEY INJURY) (HCC): Status: ACTIVE | Noted: 2024-10-07

## 2024-10-07 PROBLEM — R33.9 URINARY RETENTION: Status: ACTIVE | Noted: 2024-10-07

## 2024-10-07 PROBLEM — F19.10 POLYSUBSTANCE ABUSE (HCC): Status: ACTIVE | Noted: 2024-10-07

## 2024-10-07 LAB
ANION GAP SERPL CALCULATED.3IONS-SCNC: 10 MMOL/L (ref 4–13)
ANION GAP SERPL CALCULATED.3IONS-SCNC: 11 MMOL/L (ref 4–13)
ANION GAP SERPL CALCULATED.3IONS-SCNC: 12 MMOL/L (ref 4–13)
ATRIAL RATE: 92 BPM
BASOPHILS # BLD AUTO: 0.05 THOUSANDS/ΜL (ref 0–0.1)
BASOPHILS NFR BLD AUTO: 0 % (ref 0–1)
BUN SERPL-MCNC: 23 MG/DL (ref 5–25)
BUN SERPL-MCNC: 24 MG/DL (ref 5–25)
BUN SERPL-MCNC: 24 MG/DL (ref 5–25)
CA-I BLD-SCNC: 1.18 MMOL/L (ref 1.12–1.32)
CALCIUM SERPL-MCNC: 8.9 MG/DL (ref 8.4–10.2)
CALCIUM SERPL-MCNC: 9.1 MG/DL (ref 8.4–10.2)
CALCIUM SERPL-MCNC: 9.2 MG/DL (ref 8.4–10.2)
CHLORIDE SERPL-SCNC: 105 MMOL/L (ref 96–108)
CHLORIDE SERPL-SCNC: 106 MMOL/L (ref 96–108)
CHLORIDE SERPL-SCNC: 109 MMOL/L (ref 96–108)
CK SERPL-CCNC: 234 U/L (ref 39–308)
CO2 SERPL-SCNC: 19 MMOL/L (ref 21–32)
CO2 SERPL-SCNC: 21 MMOL/L (ref 21–32)
CO2 SERPL-SCNC: 24 MMOL/L (ref 21–32)
CREAT SERPL-MCNC: 2.72 MG/DL (ref 0.6–1.3)
CREAT SERPL-MCNC: 2.83 MG/DL (ref 0.6–1.3)
CREAT SERPL-MCNC: 2.88 MG/DL (ref 0.6–1.3)
EOSINOPHIL # BLD AUTO: 0.11 THOUSAND/ΜL (ref 0–0.61)
EOSINOPHIL NFR BLD AUTO: 1 % (ref 0–6)
ERYTHROCYTE [DISTWIDTH] IN BLOOD BY AUTOMATED COUNT: 12.6 % (ref 11.6–15.1)
GFR SERPL CREATININE-BSD FRML MDRD: 29 ML/MIN/1.73SQ M
GFR SERPL CREATININE-BSD FRML MDRD: 30 ML/MIN/1.73SQ M
GFR SERPL CREATININE-BSD FRML MDRD: 31 ML/MIN/1.73SQ M
GLUCOSE SERPL-MCNC: 100 MG/DL (ref 65–140)
GLUCOSE SERPL-MCNC: 95 MG/DL (ref 65–140)
GLUCOSE SERPL-MCNC: 95 MG/DL (ref 65–140)
HCT VFR BLD AUTO: 40.7 % (ref 36.5–49.3)
HGB BLD-MCNC: 13.5 G/DL (ref 12–17)
IMM GRANULOCYTES # BLD AUTO: 0.03 THOUSAND/UL (ref 0–0.2)
IMM GRANULOCYTES NFR BLD AUTO: 0 % (ref 0–2)
LYMPHOCYTES # BLD AUTO: 1.84 THOUSANDS/ΜL (ref 0.6–4.47)
LYMPHOCYTES NFR BLD AUTO: 13 % (ref 14–44)
MAGNESIUM SERPL-MCNC: 2 MG/DL (ref 1.9–2.7)
MCH RBC QN AUTO: 31 PG (ref 26.8–34.3)
MCHC RBC AUTO-ENTMCNC: 33.2 G/DL (ref 31.4–37.4)
MCV RBC AUTO: 93 FL (ref 82–98)
MONOCYTES # BLD AUTO: 2.02 THOUSAND/ΜL (ref 0.17–1.22)
MONOCYTES NFR BLD AUTO: 14 % (ref 4–12)
NEUTROPHILS # BLD AUTO: 10.47 THOUSANDS/ΜL (ref 1.85–7.62)
NEUTS SEG NFR BLD AUTO: 72 % (ref 43–75)
NRBC BLD AUTO-RTO: 0 /100 WBCS
P AXIS: 77 DEGREES
PHOSPHATE SERPL-MCNC: 4.8 MG/DL (ref 2.7–4.5)
PLATELET # BLD AUTO: 218 THOUSANDS/UL (ref 149–390)
PMV BLD AUTO: 11.5 FL (ref 8.9–12.7)
POTASSIUM SERPL-SCNC: 3.7 MMOL/L (ref 3.5–5.3)
POTASSIUM SERPL-SCNC: 3.9 MMOL/L (ref 3.5–5.3)
POTASSIUM SERPL-SCNC: 4.1 MMOL/L (ref 3.5–5.3)
PR INTERVAL: 194 MS
QRS AXIS: 85 DEGREES
QRSD INTERVAL: 90 MS
QT INTERVAL: 346 MS
QTC INTERVAL: 427 MS
RBC # BLD AUTO: 4.36 MILLION/UL (ref 3.88–5.62)
SODIUM SERPL-SCNC: 139 MMOL/L (ref 135–147)
T WAVE AXIS: 60 DEGREES
VENTRICULAR RATE: 92 BPM
WBC # BLD AUTO: 14.52 THOUSAND/UL (ref 4.31–10.16)

## 2024-10-07 PROCEDURE — NC001 PR NO CHARGE: Performed by: INTERNAL MEDICINE

## 2024-10-07 PROCEDURE — 97163 PT EVAL HIGH COMPLEX 45 MIN: CPT

## 2024-10-07 PROCEDURE — 97166 OT EVAL MOD COMPLEX 45 MIN: CPT

## 2024-10-07 PROCEDURE — 84100 ASSAY OF PHOSPHORUS: CPT

## 2024-10-07 PROCEDURE — 93010 ELECTROCARDIOGRAM REPORT: CPT | Performed by: INTERNAL MEDICINE

## 2024-10-07 PROCEDURE — 99233 SBSQ HOSP IP/OBS HIGH 50: CPT | Performed by: INTERNAL MEDICINE

## 2024-10-07 PROCEDURE — 95720 EEG PHY/QHP EA INCR W/VEEG: CPT | Performed by: STUDENT IN AN ORGANIZED HEALTH CARE EDUCATION/TRAINING PROGRAM

## 2024-10-07 PROCEDURE — 83735 ASSAY OF MAGNESIUM: CPT

## 2024-10-07 PROCEDURE — 82330 ASSAY OF CALCIUM: CPT

## 2024-10-07 PROCEDURE — 85025 COMPLETE CBC W/AUTO DIFF WBC: CPT

## 2024-10-07 PROCEDURE — 80048 BASIC METABOLIC PNL TOTAL CA: CPT

## 2024-10-07 PROCEDURE — 99233 SBSQ HOSP IP/OBS HIGH 50: CPT | Performed by: PSYCHIATRY & NEUROLOGY

## 2024-10-07 PROCEDURE — 82550 ASSAY OF CK (CPK): CPT | Performed by: INTERNAL MEDICINE

## 2024-10-07 PROCEDURE — 76775 US EXAM ABDO BACK WALL LIM: CPT

## 2024-10-07 RX ORDER — SODIUM CHLORIDE, SODIUM GLUCONATE, SODIUM ACETATE, POTASSIUM CHLORIDE, MAGNESIUM CHLORIDE, SODIUM PHOSPHATE, DIBASIC, AND POTASSIUM PHOSPHATE .53; .5; .37; .037; .03; .012; .00082 G/100ML; G/100ML; G/100ML; G/100ML; G/100ML; G/100ML; G/100ML
100 INJECTION, SOLUTION INTRAVENOUS CONTINUOUS
Status: DISPENSED | OUTPATIENT
Start: 2024-10-07 | End: 2024-10-08

## 2024-10-07 RX ORDER — ZONISAMIDE 100 MG/1
300 CAPSULE ORAL DAILY
Status: DISCONTINUED | OUTPATIENT
Start: 2024-10-07 | End: 2024-10-08 | Stop reason: HOSPADM

## 2024-10-07 RX ADMIN — LEVETIRACETAM 1500 MG: 750 TABLET, FILM COATED ORAL at 04:56

## 2024-10-07 RX ADMIN — SODIUM CHLORIDE, SODIUM GLUCONATE, SODIUM ACETATE, POTASSIUM CHLORIDE, MAGNESIUM CHLORIDE, SODIUM PHOSPHATE, DIBASIC, AND POTASSIUM PHOSPHATE 100 ML/HR: .53; .5; .37; .037; .03; .012; .00082 INJECTION, SOLUTION INTRAVENOUS at 21:44

## 2024-10-07 RX ADMIN — HEPARIN SODIUM 5000 UNITS: 5000 INJECTION INTRAVENOUS; SUBCUTANEOUS at 14:08

## 2024-10-07 RX ADMIN — ZONISAMIDE 300 MG: 100 CAPSULE ORAL at 08:12

## 2024-10-07 RX ADMIN — LEVETIRACETAM 1500 MG: 750 TABLET, FILM COATED ORAL at 21:44

## 2024-10-07 RX ADMIN — CHLORHEXIDINE GLUCONATE 0.12% ORAL RINSE 15 ML: 1.2 LIQUID ORAL at 08:04

## 2024-10-07 RX ADMIN — SODIUM CHLORIDE, SODIUM GLUCONATE, SODIUM ACETATE, POTASSIUM CHLORIDE, MAGNESIUM CHLORIDE, SODIUM PHOSPHATE, DIBASIC, AND POTASSIUM PHOSPHATE 100 ML/HR: .53; .5; .37; .037; .03; .012; .00082 INJECTION, SOLUTION INTRAVENOUS at 13:05

## 2024-10-07 RX ADMIN — HEPARIN SODIUM 5000 UNITS: 5000 INJECTION INTRAVENOUS; SUBCUTANEOUS at 05:00

## 2024-10-07 NOTE — PROGRESS NOTES
Progress Note - Critical Care/ICU   Name: Tim Alanis Jr. 24 y.o. male I MRN: [id number]  Unit/Bed#: ICU 03 I Date of Admission: 10/6/2024   Date of Service: 10/7/2024 I Hospital Day: 1     Critical Care Interval Transfer Note:    Brief Hospital Summary:     24-year-old male with past medical history seizure disorder and polysubstance abuse presented to [facility] 10/6 for seizure.  UDS was positive for THC and methamphetamine.  Patient was treated with Ativan and Keppra.  Required intubation after remaining in status epilepticus.  Patient was transferred to [facility] for video EEG and further care.  He was extubated yesterday afternoon.  While on video EEG, there were no seizures captured, however patient did pull his leads off early this morning.  He otherwise has been continued on his home antiepileptic regimen -Keppra 1500 mg twice daily, Zonisamide 300 daily.  Overnight, patient with urinary retention. Labs this AM revealed BALWINDER. Patient otherwise is medically stable for downgrade to med surg.     Barriers to discharge:   Seizure disorder  Continue Keppra 1500 mg twice daily, Zonisamide 300 daily  Q4 neuro checks   Neurology following  Urinary retention, BALWINDER  US kidney and bladder  Isolyte 100/hr  Check CK level   Urinary retention protocol   Monitor BMP      Consults: IP CONSULT TO CASE MANAGEMENT  IP CONSULT TO NEUROLOGY  IP CONSULT TO CASE MANAGEMENT    Patient seen and evaluated by Critical Care today and deemed to be appropriate for transfer to Med Surg. Spoke to Dr. Puentes from [facility] to accept transfer. Critical care can be contacted via SecureChat with any questions or concerns.

## 2024-10-07 NOTE — PLAN OF CARE
Problem: PAIN - ADULT  Goal: Verbalizes/displays adequate comfort level or baseline comfort level  Description: Interventions:  - Encourage patient to monitor pain and request assistance  - Assess pain using appropriate pain scale  - Administer analgesics based on type and severity of pain and evaluate response  - Implement non-pharmacological measures as appropriate and evaluate response  - Consider cultural and social influences on pain and pain management  - Notify physician/advanced practitioner if interventions unsuccessful or patient reports new pain  Outcome: Progressing     Problem: INFECTION - ADULT  Goal: Absence or prevention of progression during hospitalization  Description: INTERVENTIONS:  - Assess and monitor for signs and symptoms of infection  - Monitor lab/diagnostic results  - Monitor all insertion sites, i.e. indwelling lines, tubes, and drains  - Monitor endotracheal if appropriate and nasal secretions for changes in amount and color  - [place] appropriate cooling/warming therapies per order  - Administer medications as ordered  - Instruct and encourage patient and family to use good hand hygiene technique  - Identify and instruct in appropriate isolation precautions for identified infection/condition  Outcome: Progressing  Goal: Absence of fever/infection during neutropenic period  Description: INTERVENTIONS:  - Monitor WBC    Outcome: Progressing     Problem: SAFETY ADULT  Goal: Patient will remain free of falls  Description: INTERVENTIONS:  - Educate patient/family on patient safety including physical limitations  - Instruct patient to call for assistance with activity   - Consult OT/PT to assist with strengthening/mobility   - Keep Call bell within reach  - Keep bed low and locked with side rails adjusted as appropriate  - Keep care items and personal belongings within reach  - Initiate and maintain comfort rounds  - Make Fall Risk Sign visible to staff  - Offer Toileting   - Obtain  necessary fall risk management equipment  - Apply yellow socks and bracelet for high fall risk patients  - Consider moving patient to room near nurses station  Outcome: Progressing  Goal: Maintain or return to baseline ADL function  Description: INTERVENTIONS:  -  Assess patient's ability to carry out ADLs; assess patient's baseline for ADL function and identify physical deficits which impact ability to perform ADLs (bathing, care of mouth/teeth, toileting, grooming, dressing, etc.)  - Assess/evaluate cause of self-care deficits   - Assess range of motion  - Assess patient's mobility; develop plan if impaired  - Assess patient's need for assistive devices and provide as appropriate  - Encourage maximum independence but intervene and supervise when necessary  - Involve family in performance of ADLs  - Assess for home care needs following discharge   - Consider OT consult to assist with ADL evaluation and planning for discharge  - Provide patient education as appropriate  Outcome: Progressing  Goal: Maintains/Returns to pre admission functional level  Description: INTERVENTIONS:  - Perform AM-PAC 6 Click Basic Mobility/ Daily Activity assessment daily.  - Set and communicate daily mobility goal to care team and patient/family/caregiver.   - Collaborate with rehabilitation services on mobility goals if consulted  - Perform Range of Motion   - Reposition patient   - Dangle patient   - Stand patient   - Ambulate patient   - Out of bed to chair   - Out of bed for meals   - Out of bed for toileting  - Record patient progress and toleration of activity level   Outcome: Progressing     Problem: DISCHARGE PLANNING  Goal: Discharge to home or other facility with appropriate resources  Description: INTERVENTIONS:  - Identify barriers to discharge w/patient and caregiver  - Arrange for needed discharge resources and transportation as appropriate  - Identify discharge learning needs (meds, wound care, etc.)  - Arrange for  interpretive services to assist at discharge as needed  - Refer to Case Management Department for coordinating discharge planning if the patient needs post-hospital services based on physician/advanced practitioner order or complex needs related to functional status, cognitive ability, or social support system  Outcome: Progressing     Problem: Knowledge Deficit  Goal: Patient/family/caregiver demonstrates understanding of disease process, treatment plan, medications, and discharge instructions  Description: Complete learning assessment and assess knowledge base.  Interventions:  - Provide teaching at level of understanding  - Provide teaching via preferred learning methods  Outcome: Progressing     Problem: SAFETY,RESTRAINT: NV/NON-SELF DESTRUCTIVE BEHAVIOR  Goal: Remains free of harm/injury (restraint for non violent/non self-detsructive behavior)  Description: INTERVENTIONS:  - Instruct patient/family regarding restraint use   - Assess and monitor physiologic and psychological status   - Provide interventions and comfort measures to meet assessed patient needs   - Identify and implement measures to help patient regain control  - Assess readiness for release of restraint   Outcome: Progressing  Goal: Returns to optimal restraint-free functioning  Description: INTERVENTIONS:  - Assess the patient's behavior and symptoms that indicate continued need for restraint  - Identify and implement measures to help patient regain control  - Assess readiness for release of restraint   Outcome: Progressing     Problem: Potential for Falls  Goal: Patient will remain free of falls  Description: INTERVENTIONS:  - Educate patient/family on patient safety including physical limitations  - Instruct patient to call for assistance with activity   - Consult OT/PT to assist with strengthening/mobility   - Keep Call bell within reach  - Keep bed low and locked with side rails adjusted as appropriate  - Keep care items and personal  belongings within reach  - Initiate and maintain comfort rounds  - Make Fall Risk Sign visible to staff  - Offer Toileting   - Obtain necessary fall risk management equipment  - Apply yellow socks and bracelet for high fall risk patients  - Consider moving patient to room near nurses station  Outcome: Progressing     Problem: Prexisting or High Potential for Compromised Skin Integrity  Goal: Skin integrity is maintained or improved  Description: INTERVENTIONS:  - Identify patients at risk for skin breakdown  - Assess and monitor skin integrity  - Assess and monitor nutrition and hydration status  - Monitor labs   - Assess for incontinence   - Turn and reposition patient  - Assist with mobility/ambulation  - Relieve pressure over bony prominences  - Avoid friction and shearing  - Provide appropriate hygiene as needed including keeping skin clean and dry  - Evaluate need for skin moisturizer/barrier cream  - Collaborate with interdisciplinary team   - Patient/family teaching  - Consider wound care consult   Outcome: Progressing

## 2024-10-07 NOTE — ASSESSMENT & PLAN NOTE
Developed urinary retention with BALWINDER overnight. Has had issues voiding since marion was removed. Suspect could be related to sedating medications and immobility vs irritation 2/2 marion placement.     -if unable to void this afternoon, will start intermittant straight caths vs replacing marion  -F/u renal US  -See BALWINDER problem below

## 2024-10-07 NOTE — NURSING NOTE
"0120: Pt attempted to get out of bed and removed all monitoring leads including EEG leads off his head despite continued education prior to incident on how to use the call bell. Pt stated that \"I just wanted to get out of bed.\" RN reminded the pt that he needs to call when he wants to get out of bed or to use the bathroom. Pt assisted x1 to the bathroom but did not void. Pt assisted back to bed and all monitoring leads placed back on pt. Bed alarm on. RONN Wagner notified and aware, and states that EEG leads should be placed back on pt. EMU called 3 times but did not answer. RONN Wagner notified and aware.   "

## 2024-10-07 NOTE — OCCUPATIONAL THERAPY NOTE
"    Occupational Therapy Evaluation     Patient Name: Tim Alanis Jr.  Today's Date: 10/7/2024  Problem List  Active Problems:    Leukocytosis    Status epilepticus (HCC)    Urinary retention    BALWINDER (acute kidney injury) (HCC)    Polysubstance abuse (HCC)    Past Medical History  Past Medical History:   Diagnosis Date    Seizure (HCC)      Past Surgical History  No past surgical history on file.        10/07/24 0947   Note Type   Note type Evaluation   Pain Assessment   Pain Assessment Tool 0-10   Pain Score No Pain   Restrictions/Precautions   Weight Bearing Precautions Per Order No   Other Precautions Impulsive;Chair Alarm;Bed Alarm;Telemetry   Home Living   Type of Home House   Home Layout Multi-level  (Bedroom on main floor, bathroom in the basement with one flight of stairs to basement)   Bathroom Shower/Tub Walk-in shower   Bathroom Toilet Standard   Prior Function   Level of [place] Independent with ADLs;Independent with functional mobility;Independent with IADLS   Lives With Family   Receives Help From Family   IADLs Family/Friend/Other provides transportation   Falls in the last 6 months 0   Lifestyle   Autonomy Pt was independent with ADLs, most IADLs, and functional mobility.   Reciprocal Relationships Pt lives with family who can provide transport and assistance as needed.   Service to Others It is unknown if patient works.   Intrinsic Gratification Pt did not report any hobbies or interests.   Subjective   Subjective \"I want to go home\"   ADL   Eating Assistance 7  Independent   Grooming Assistance 6  Modified Independent   UB Bathing Assistance 7  Independent   LB Bathing Assistance 6  Modified Independent   UB Dressing Assistance 7  Independent   LB Dressing Assistance 6  Modified independent   Toileting Assistance  7  Independent   Bed Mobility   Supine to Sit 5  Supervision   Transfers   Sit to Stand 5  Supervision   Stand to Sit 5  Supervision   Balance   Static Sitting Good   Dynamic " -- DO NOT REPLY / DO NOT REPLY ALL --  -- Message is from Engagement Center Operations (ECO) --    General Patient Message: Patient called in stated he needs a leave of absence letter - Last day of work was 03- - Patient will like to return back  03- - Patient stated can we please e-mail letter to him @ [email or web address] - Please advise.    Caller Information         Type Contact Phone/Fax    03/21/2024 01:52 PM CDT Phone (Incoming) Alcides Morris (Self) [phone] (M)          Alternative phone number: [phone]    Can a detailed message be left? Yes    Message Turnaround:     Is it Working Hours? Yes - Working Hours                      Sitting Good   Static Standing Good   Dynamic Standing Fair +   Ambulatory Fair +   Activity Tolerance   Activity Tolerance Treatment limited secondary to agitation  (Pt did not want to ambulate during evaluation.)   Medical Staff Made Aware PT present for co-evaluation   Nurse Made Aware Nursing cleared pt for therapy   RUE Assessment   RUE Assessment WFL   LUE Assessment   LUE Assessment WFL   Hand Function   Gross Motor Coordination Functional   Fine Motor Coordination Functional   Cognition   Arousal/Participation Alert;Arousable;Cooperative   Orientation Level Oriented X4   Following Commands Follows all commands and directions without difficulty   Assessment   Limitation Decreased endurance   Prognosis Good   Assessment Pt is a 25 yo male admitted to [facility] on 10/6/24. Pt intially presented to ED at [facility] after the patient apparently had 5 seizures throughout the day today at times returning to baseline, EMS reports mother saw him strike the refrigerator with his head at 1 point. Pt rransfered to [facility] for vEEG and further care. Pt has a past medical history of Seizure (HCC). Pt currently lives in a 3  with his family. At baseline, pt was independent with ADLs, most IADLs, and functional mobility. Currently, pt is independent and modified independent with ADLs.  Pt currently presents with impairments in the following categories, min difficulty performing IADLS, environment activity tolerance, endurance, and standing balance/tolerance. These impairments limit pt's ability to safely engage in areas of occupation, including functional mobility and transfers, community mobility, laundry, driving, house maintenance, meal prep, cleaning, social participation, and leisure activities however has supportive family who are able to provide assist as needed. From OT standpoint, recommend Level IV resources upon D/C. Anticipate d/c home when medically cleared. No further acute OT needs indicated at this time, d/c  from caseload.   Goals   Patient Goals To go home   Discharge Recommendation   Rehab Resource Intensity Level, OT No post-acute rehabilitation needs   AM-PAC Daily Activity Inpatient   Lower Body Dressing 4   Bathing 4   Toileting 4   Upper Body Dressing 4   Grooming 4   Eating 4   Daily Activity Raw Score 24   Daily Activity Standardized Score (Calc for Raw Score >=11) 57.54   AM-PAC Applied Cognition Inpatient   Following a Speech/Presentation 4   Understanding Ordinary Conversation 4   Taking Medications 4   Remembering Where Things Are Placed or Put Away 4   Remembering List of 4-5 Errands 4   Taking Care of Complicated Tasks 4   Applied Cognition Raw Score 24   Applied Cognition Standardized Score 62.21   End of Consult   Education Provided Yes   Patient Position at End of Consult Bedside chair;Bed/Chair alarm activated;All needs within reach   Nurse Communication Nurse aware of consult     ROXY Perez

## 2024-10-07 NOTE — PROGRESS NOTES
Progress Note - Critical Care/ICU   Name: Tim Alanis Jr. 24 y.o. male I MRN: [id number]  Unit/Bed#: ICU 03 I Date of Admission: 10/6/2024   Date of Service: 10/7/2024 I Hospital Day: 1   Assessment & Plan   Neuro:   Diagnosis: Status epilepticus - resolved, polysubstance use   Suspect etiology 2/2 medication non adherence and methamphetamine use  Plan  Continue home AEDs  Keppra 1,500mg Q12hrs  Zonisamide 300mg daily   OFF vEEG  Q4 neuro checks    CV:   No acute issues  Continuous cardiopulmonary monitoring  MAP goal >65     Pulm:  Diagnosis: intubated for airway protection in the setting of status epilepticus, resolved  Extubated 10/6  O2 sat goal >92%     GI:   Diagnosis: no acute issues  Bowel regimen: Miralax, Senna      :   Diagnosis: BALWINDER, concern for urinary retention  Dong d/c'd 10/6  Repeat BMP  Urinary retention protocol  Monitor Is/Os, renal indices       F/E/N:   Fluids: no maintenance fluids   Electrolytes: Replete as needed, goal Mg >2, Phos >3, K>4  Nutrition: Regular diet     Heme/Onc:   Daignosis: DVT ppx  Heparin SQ  SCDs     Endo:   No acute issues     ID:   Diagnosis: Leukocytosis, improved  Suspect in the setting of status epilepticus   Continue to monitor am CBC   Monitor temperature curve and WBC count     MSK/Skin:   Diagnosis: At risk for pressure injury  PT/OT when able   Skin surveillance   Frequent turns and repositioning             Disposition: Med Surg    ICU Core Measures     A: Assess, Prevent, and Manage Pain Has pain been assessed? Yes  Need for changes to pain regimen? No   B: Both SAT/SAT  N/A   C: Choice of Sedation N/A   D: Delirium CAM-ICU: Negative   E: Early Mobility  Plan for early mobility? Yes   F: Family Engagement Plan for family engagement today? Yes       Review of Invasive Devices:            Prophylaxis:  VTE VTE covered by:  heparin (porcine), Subcutaneous, 5,000 Units at 10/07/24 0500       Stress Ulcer  not ordered       24 Hour Events : Overnight,  patient pulled off his EEG leads. This AM, patient denies headache, dizziness, change in vision or any other neurological symptoms. He reports he just wants to sleep and go home.     Subjective   Review of Systems: See HPI for Review of Systems    Objective :                   Vitals I/O      Most Recent Min/Max in 24hrs   Temp 98.3 °F (36.8 °C) Temp  Min: 97.6 °F (36.4 °C)  Max: 99 °F (37.2 °C)   Pulse (!) 50 Pulse  Min: 50  Max: 88   Resp (!) 32 Resp  Min: 14  Max: 42   /76 BP  Min: 95/58  Max: 136/73   O2 Sat 99 % SpO2  Min: 97 %  Max: 100 %      Intake/Output Summary (Last 24 hours) at 10/7/2024 0647  Last data filed at 10/7/2024 0600  Gross per 24 hour   Intake 948 ml   Output 965 ml   Net -17 ml       Diet Regular; Regular House    Invasive Monitoring           Physical Exam   Physical Exam  Vitals and nursing note reviewed.   Eyes:      Extraocular Movements: Extraocular movements intact.      Pupils: Pupils are equal, round, and reactive to light.   Skin:     General: Skin is warm and dry.   HENT:      Head: Normocephalic and atraumatic.   Cardiovascular:      Rate and Rhythm: Normal rate and regular rhythm.      Pulses: Normal pulses.      Heart sounds: Normal heart sounds.   Musculoskeletal:      Right lower leg: No edema.      Left lower leg: No edema.   Abdominal:      Palpations: Abdomen is soft.      Tenderness: There is no abdominal tenderness.   Constitutional:       Appearance: He is well-developed and well-nourished.   Pulmonary:      Effort: Pulmonary effort is normal.      Breath sounds: Normal breath sounds.   Psychiatric:         Speech: Speech is not no expressive aphasia.   Neurological:      General: No focal deficit present.      Mental Status: He is alert.      Cranial Nerves: No dysarthria or facial asymmetry.      Sensory: No sensory deficit.      Motor: Strength full and intact in all extremities.      Comments: Oriented to time and person, able to state at [facility],  however thought we were at [facility].          Medications:  Scheduled PRN   chlorhexidine, 15 mL, Q12H DAYO  heparin (porcine), 5,000 Units, Q8H DAYO  levETIRAcetam, 1,500 mg, Q12H DAYO  polyethylene glycol, 17 g, Daily  senna, 8.8 mg, HS  zonisamide, 300 mg, Daily      acetaminophen, 650 mg, Q6H PRN       Continuous          Labs:   CBC    Recent Labs     10/06/24  0700 10/07/24  0454   WBC 27.36* 14.52*   HGB 14.5 13.5   HCT 43.3 40.7    218     BMP    Recent Labs     10/06/24  0700 10/07/24  0454   SODIUM 140 139   K 4.1 3.9    106   CO2 23 21   AGAP 9 12   BUN 13 23   CREATININE 1.00 2.83*   CALCIUM 8.8 9.2       Coags    No recent results     Additional Electrolytes  Recent Labs     10/06/24  0700 10/07/24  0454   MG 2.1 2.0   PHOS 2.0* 4.8*   CAIONIZED 1.13 1.18          Blood Gas    No recent results  Recent Labs     10/06/24  0700   PHVEN 7.290*   VYH7TVF 47.5   PO2VEN 40.6   LNG4AYZ 22.3*   BEVEN -4.5   C1VNZFU 72.8    LFTs  Recent Labs     10/06/24  0138 10/06/24  0700   ALT 15 13   AST 22 20   ALKPHOS 65 65   ALB 5.2* 4.8   TBILI 0.29 0.44       Infectious  Recent Labs     10/06/24  0155   PROCALCITONI <0.05     Glucose  Recent Labs     10/06/24  0138 10/06/24  0700 10/07/24  0454   GLUC 260* 81 95

## 2024-10-07 NOTE — UTILIZATION REVIEW
Initial Clinical Review    TRANSFER FROM [facility]  ED    Admission: Date/Time/Statement:   Admission Orders (From admission, onward)       Ordered        10/06/24 0636  INPATIENT ADMISSION  Once                          Orders Placed This Encounter   Procedures    INPATIENT ADMISSION     Standing Status:   Standing     Number of Occurrences:   1     Order Specific Question:   Level of Care     Answer:   Critical Care [15]     Order Specific Question:   Estimated length of stay     Answer:   More than 2 Midnights     Order Specific Question:   Certification     Answer:   I certify that inpatient services are medically necessary for this patient for a duration of greater than two midnights. See H&P and MD Progress Notes for additional information about the patient's course of treatment.         Initial Presentation: 24 y.o. male initially  presented to ED at  [facility] after a seizure earlier  the day of admission, treated  with  keppra  and  ativan.  Required intubation after remaining in   status  epilepticus.  Transferred to [facility] for  vEEG and further care.  Admit  Ip  Neuro  ICU  with Status  epilepticus,  Encephalopathy, Acute respiratory failure and polysubstance abuse and plan is   keppra,  vent support, wean precedex, vEEG  and close monitoring.    10/6  Extubated    Neuro consult  Status epilepticus, likely  2nd  to  noncompliance and  polysubstance abuse ( methamphetamine and THC).   On vEEG at least  for 24 hrs.   No additional imaging at present.              Scheduled Medications:  chlorhexidine, 15 mL, Mouth/Throat, Q12H DAYO  heparin (porcine), 5,000 Units, Subcutaneous, Q8H DAYO  levETIRAcetam, 1,500 mg, Oral, Q12H DAYO  polyethylene glycol, 17 g, Oral, Daily  senna, 8.8 mg, Per NG Tube, HS  zonisamide, 300 mg, Oral, Daily      Continuous IV Infusions:     PRN Meds:  acetaminophen, 650 mg, Oral, Q6H PRN      ED Triage Vitals   Temperature Pulse Respirations Blood Pressure SpO2 Pain Score   10/06/24  0800 10/06/24 0633 10/06/24 0633 10/06/24 0800 10/06/24 0633 10/06/24 0727   98 °F (36.7 °C) 88 17 119/66 100 % Med Not Given for Pain - for MAR use only           Vital Signs (last 3 days)       Date/Time Temp Pulse Resp BP MAP (mmHg) SpO2 O2 Device Patient Position - Orthostatic VS Wilber Coma Scale Score Pain    10/07/24 0900 -- 60 16 117/77 89 98 % None (Room air) Lying -- --    10/07/24 0800 97.9 °F (36.6 °C) 60 -- 119/73 91 98 % None (Room air) Lying 15 No Pain    10/07/24 0700 -- 64 18 114/78 89 99 % None (Room air) Lying -- --    10/07/24 0600 -- 50 32 123/76 90 99 % -- -- 15 --    10/07/24 0500 -- 78 22 136/73 93 99 % -- -- 15 --    10/07/24 0400 98.3 °F (36.8 °C) 62 42 116/66 85 99 % None (Room air) -- 15 No Pain    10/07/24 0300 -- 60 17 128/75 91 99 % -- -- 15 --    10/07/24 0200 -- 74 15 123/75 89 98 % -- -- 15 --    10/07/24 0100 -- 66 18 124/51 79 98 % -- -- 15 --    10/07/24 0000 98.1 °F (36.7 °C) 70 29 100/57 70 98 % None (Room air) -- 15 No Pain    10/06/24 2300 -- 74 17 118/63 80 98 % -- -- 15 --    10/06/24 2200 -- 76 19 121/74 91 99 % -- -- 15 --    10/06/24 2100 -- 68 20 112/54 75 98 % -- -- 15 --    10/06/24 2000 97.6 °F (36.4 °C) 88 14 133/63 84 98 % None (Room air) -- 15 No Pain    10/06/24 1900 -- 82 21 106/61 78 99 % -- -- 15 --    10/06/24 1800 -- 62 16 113/77 89 99 % -- -- 15 --    10/06/24 1700 -- 70 15 107/67 85 98 % -- -- 15 --    10/06/24 1600 98.7 °F (37.1 °C) 70 16 107/67 82 97 % None (Room air) Lying 15 No Pain    10/06/24 1509 -- -- -- -- -- -- -- -- 15 --    10/06/24 1500 -- 66 17 95/58 70 99 % -- -- 10 --    10/06/24 1400 -- 72 17 101/54 64 100 % -- -- 7 --    10/06/24 1314 -- -- -- -- -- 100 % -- -- -- --    10/06/24 1300 -- 74 16 97/64 74 100 % -- -- 7 --    10/06/24 1200 99 °F (37.2 °C) 78 16 108/69 86 100 % Ventilator Lying 7 --    10/06/24 1100 -- 80 15 109/70 86 99 % -- -- 7 --    10/06/24 1000 -- 80 15 113/68 84 100 % -- -- 7 --    10/06/24 0900 -- 84 17 109/69 81 99 %  -- -- 7 --    10/06/24 0800 98 °F (36.7 °C) 88 16 119/66 84 97 % Ventilator Lying 7 --    10/06/24 0727 -- -- -- -- -- -- -- -- -- Med Not Given for Pain - for MAR use only    10/06/24 0715 -- -- -- -- -- 100 % -- -- -- --    10/06/24 0633 -- 88 17 -- -- 100 % -- -- -- --              Pertinent Labs/Diagnostic Test Results:   Radiology:  X-ray chest 1 view   Final Interpretation by Estephania Lee MD (10/06 1734)      No acute cardiopulmonary disease.      ET tube 4 cm above the sudeep.      Workstation performed: [id number]               Results from last 7 days   Lab Units 10/07/24  0454 10/06/24  0700 10/06/24  0138   WBC Thousand/uL 14.52* 27.36* 27.62*   HEMOGLOBIN g/dL 13.5 14.5 15.2   HEMATOCRIT % 40.7 43.3 48.4   PLATELETS Thousands/uL 218 269 310   TOTAL NEUT ABS Thousands/µL 10.47* 22.33* 20.97*         Results from last 7 days   Lab Units 10/07/24  0828 10/07/24  0454 10/06/24  0700 10/06/24  0138   SODIUM mmol/L 139 139 140 139   POTASSIUM mmol/L 4.1 3.9 4.1 3.8   CHLORIDE mmol/L 109* 106 108 101   CO2 mmol/L 19* 21 23 8*   ANION GAP mmol/L 11 12 9 30*   BUN mg/dL 24 23 13 11   CREATININE mg/dL 2.88* 2.83* 1.00 1.06   EGFR ml/min/1.73sq m 29 30 106 99   CALCIUM mg/dL 9.1 9.2 8.8 9.3   CALCIUM, IONIZED mmol/L  --  1.18 1.13  --    MAGNESIUM mg/dL  --  2.0 2.1  --    PHOSPHORUS mg/dL  --  4.8* 2.0*  --      Results from last 7 days   Lab Units 10/06/24  0700 10/06/24  0138   AST U/L 20 22   ALT U/L 13 15   ALK PHOS U/L 65 65   TOTAL PROTEIN g/dL 7.0 7.6   ALBUMIN g/dL 4.8 5.2*   TOTAL BILIRUBIN mg/dL 0.44 0.29         Results from last 7 days   Lab Units 10/07/24  0828 10/07/24  0454 10/06/24  0700 10/06/24  0138   GLUCOSE RANDOM mg/dL 95 95 81 260*              Results from last 7 days   Lab Units 10/06/24  0700 10/06/24  0155   PH FRANCY  7.290* 6.931*   PCO2 FRANCY mm Hg 47.5 28.6*   PO2 FRANCY mm Hg 40.6 110.0*   HCO3 FRANCY mmol/L 22.3* 5.9*   BASE EXC FRANCY mmol/L -4.5 -25.8   O2 CONTENT FRANCY ml/dL 15.3 21.9    O2 HGB, VENOUS % 72.8 93.9*         Results from last 7 days   Lab Units 10/06/24  0155   CK TOTAL U/L 418*     Results from last 7 days   Lab Units 10/06/24  0138   HS TNI 0HR ng/L 7                 Results from last 7 days   Lab Units 10/06/24  0155   PROCALCITONIN ng/ml <0.05     Results from last 7 days   Lab Units 10/06/24  0419 10/06/24  0155   LACTIC ACID mmol/L 3.3* 19.2*               Results from last 7 days   Lab Units 10/06/24  0158   CLARITY UA  Clear   COLOR UA  Yellow   SPEC GRAV UA  >=1.030   PH UA  5.5   GLUCOSE UA mg/dl 100 (1/10%)*   KETONES UA mg/dl Negative   BLOOD UA  Moderate*   PROTEIN UA mg/dl 30 (1+)*   NITRITE UA  Negative   BILIRUBIN UA  Negative   UROBILINOGEN UA E.U./dl 0.2   LEUKOCYTES UA  Negative   WBC UA /hpf 0-5   RBC UA /hpf 0-5   BACTERIA UA /hpf Occasional   EPITHELIAL CELLS WET PREP /hpf Occasional             Results from last 7 days   Lab Units 10/06/24  0158   AMPH/METH  Positive*   BARBITURATE UR  Negative   BENZODIAZEPINE UR  Negative   COCAINE UR  Negative   METHADONE URINE  Negative   OPIATE UR  Negative   PCP UR  Negative   THC UR  Positive*     Results from last 7 days   Lab Units 10/06/24  0155   ETHANOL LVL mg/dL <10   ACETAMINOPHEN LVL ug/mL <2*   SALICYLATE LVL mg/dL <5                 Results from last 7 days   Lab Units 10/06/24  0248 10/06/24  0244   BLOOD CULTURE  Received in Microbiology Lab. Culture in Progress. Received in Microbiology Lab. Culture in Progress.               Present on Admission:   Leukocytosis      Admitting Diagnosis: Seizure (HCC) [R56.9]  Age/Sex: 24 y.o. male    Network Utilization Review Department  ATTENTION: Please call with any questions or concerns to [phone] and carefully listen to the prompts so that you are directed to the right person. All voicemails are confidential.   For Discharge needs, contact Care Management DC Support Team at [phone] opt. 2  Send all requests for admission clinical reviews, approved or denied  determinations and any other requests to dedicated fax number below belonging to the campus where the patient is receiving treatment. List of dedicated fax numbers for the Facilities:  FACILITY NAME UR FAX NUMBER   ADMISSION DENIALS (Administrative/Medical Necessity) [phone]   DISCHARGE SUPPORT TEAM (NETWORK) [phone]   PARENT CHILD HEALTH (Maternity/NICU/Pediatrics) [phone]   [facility] [phone]   [facility] [phone]   [facility] [phone]   [facility] [phone]   [facility] [phone]   [facility] [phone]   [facility] [phone]   [facility] [phone]   [facility] [phone]   [facility] [phone]   [facility] [phone]   [facility] [phone]

## 2024-10-07 NOTE — ASSESSMENT & PLAN NOTE
CBC showed WBC of 27.6 at presentation --> 14.52 today. Suspect 2/2 seizures rather than infection. Noted elevated lactate at time of presentation. Blood cultures pending.     Also on ddx is aspiration PNA iso acute encephalopathy (less likely given clear lungs on CXR). UA appears non-infectious. Given rapid improvement, low suspicion for infection at this time.     -Trend CBC  -F/u blood cultures  -Will monitor for any signs/symptoms of infection

## 2024-10-07 NOTE — ASSESSMENT & PLAN NOTE
Baseline Cr 0.7-0.9. At presentation Cr was 1.0.  On 10/7, Cr increased to 2.83 --> 2.88 with BUN 23-24. Has been having urinary retention after marion was removed overnight. Now reportedly starting to void more often. Suspect BALWINDER 2/2 obstructive given issues with urinary retention vs possible ATN. Less likely prerenal given BUN/Cr ratio < 20.    Notably, UA at presentation showed moderate 1+ blood but only 0-5 RBC on micro. Suggests possible rhabdo contributing however CK today WNL (234). Started on isolyte 100 cc/hr. Renal US ordered.    -Continue isolyte at 100 cc/hr  -F/u renal US  -Trend BMP and UOP

## 2024-10-07 NOTE — SEPSIS NOTE
Sepsis Note   Tim Alanis  24 y.o. male MRN: [id number]  Unit/Bed#: ICU 03 Encounter: [id number]       Initial Sepsis Screening       Row Name 10/07/24 0551                Is the patient's history suggestive of a new or worsening infection? No  -RM                  User Key  (r) = Recorded By, (t) = Taken By, (c) = Cosigned By      Initials Name Provider Type    RM Navdeep Cardenas PA-C Physician Assistant                        There is no height or weight on file to calculate BMI.  Wt Readings from Last 1 Encounters:   10/06/24 79 kg (174 lb 2.6 oz)        Ideal body weight: 79.9 kg (176 lb 2.4 oz)

## 2024-10-07 NOTE — ASSESSMENT & PLAN NOTE
23 yo M with epilepsy maintained on Keppra 1500 mg twice daily and zonisamide 300 mg daily. History of multiple ED visits in both [facility] and [facility] for breakthrough seizures usually in the clinical context of medication noncompliance and substance abuse. ED reports multiple seizures throughout the day of presentation, including 2 back-to-back seizures the night of admission without return to baseline fitting criteria for status epilepticus. In the ED, patient received:   - Keppra 1 g at 1:50 AM, 2 g at 4:19 AM  - Lorazepam 2 mg at 1:35 AM,  - Versed 5 mg at 4:43 AM  - Propofol started at 1:59 AM  - Precedex started at 10:53 AM    Workup  - CTH: Unremarkable  - Lactic acid 19.2 normalized to 3.3 after 2 hours  -   - UDS +amphetamines/methamphetamines and +THC   - Levetiracetam <2.0  - vEEG (10/6-7): Moderate diffuse encephalopathy. No epileptiform discharges or lateralizing signs are recorded.     Impression: Status epilepticus likely secondary to medication noncompliance and polysubstance abuse (methamphetamines and THC). Patient reports no missed doses of AED despite levetiracetam level being subtherapeutic.    Plan  Case discussed with neurology attending Dr. Dacosta  Discontinue vEEG  Repeat levetiracetam level tomorrow morning  Follow zonisamide level  Repeat levetiracetam level in 1 week, if still subtherapeutic would increase to 2000 mg twice a day  Continue home doses of Keppra 1500 mg twice a day and zonisamide 300 mg daily.  No clear need for additional neuroimaging at this time.  Educated the patient on the importance of medication adherence   Appreciate assistance from  and case management for drug abstinence resources to offer to patient  Rest of care per primary team

## 2024-10-07 NOTE — ASSESSMENT & PLAN NOTE
Presented in status epilepticus prompting transfer to [facility] for neuroICU and vEEG. S/p ativan and keppra load in ED. Intubated for airway protection. Suspect status prompted by medication non-compliance and substance abuse given UDS + for methampheatimes/THC.    No longer in status. Receiving keppra 1500 mg PO BID and zonisamide 300 mg daily. S/p extubation. vEEG showed no epileptiform discharges however couldn't get full 24 h as patient reportedly ripped off vEEG leads.    -Continue Keppra 1500 mg PO BID and zonisamide 300 daily  -Neuro following, appreciate recs

## 2024-10-07 NOTE — PROGRESS NOTES
Progress Note - Neurology   Name: Tim Alanis Jr. 24 y.o. male I MRN: [id number]  Unit/Bed#: ICU 03 I Date of Admission: 10/6/2024   Date of Service: 10/7/2024 I Hospital Day: 1    Assessment & Plan  Status epilepticus (HCC)  23 yo M with epilepsy maintained on Keppra 1500 mg twice daily and zonisamide 300 mg daily. History of multiple ED visits in both [facility] and [facility] for breakthrough seizures usually in the clinical context of medication noncompliance and substance abuse. ED reports multiple seizures throughout the day of presentation, including 2 back-to-back seizures the night of admission without return to baseline fitting criteria for status epilepticus. In the ED, patient received:   - Keppra 1 g at 1:50 AM, 2 g at 4:19 AM  - Lorazepam 2 mg at 1:35 AM,  - Versed 5 mg at 4:43 AM  - Propofol started at 1:59 AM  - Precedex started at 10:53 AM    Workup  - CTH: Unremarkable  - Lactic acid 19.2 normalized to 3.3 after 2 hours  -   - UDS +amphetamines/methamphetamines and +THC   - Levetiracetam <2.0  - vEEG (10/6-7): Moderate diffuse encephalopathy. No epileptiform discharges or lateralizing signs are recorded.     Impression: Status epilepticus likely secondary to medication noncompliance and polysubstance abuse (methamphetamines and THC). Patient reports no missed doses of AED despite levetiracetam level being subtherapeutic.    Plan  Case discussed with neurology attending Dr. Dacosta  Discontinue vEEG  Repeat levetiracetam level tomorrow morning  Follow zonisamide level  Repeat levetiracetam level in 1 week, if still subtherapeutic would increase to 2000 mg twice a day  Continue home doses of Keppra 1500 mg twice a day and zonisamide 300 mg daily.  No clear need for additional neuroimaging at this time.  Educated the patient on the importance of medication adherence   Appreciate assistance from  and case management for drug abstinence resources to offer to patient  Rest of  care per primary team    Leukocytosis  Recent Labs     10/06/24  0138 10/06/24  0700 10/07/24  0454   WBC 27.62* 27.36* 14.52*     Impression: Likely due to multiple convulsive seizures.     Plan  Recommend continue to trend WBC  If leukocytosis persists, will consider additional infectious screen/workup.  Will defer additional workup and management to primary team.    Patient has established care with [facility] neurology, can continue to follow.     No further recommendations for neurologic inpatient work up at this time. Please contact neurology team with any questions or concerns.    Subjective   Patient seen and examined at the bedside. No acute overnight events. Patient reports that he feels well and has no new or worsening concerns. Patient acknowledges he has acute kidney injury and states that he wishes to go home as soon as possible. He states that he has not missed any doses of his seizure medications. Patient denies any seizure like activity since his presenting seizure and any muscle pains.     Review of Systems  A 12 point ROS was completed. Other than the above mentioned  complaints, all remaining systems were negative.    Objective :  Temp:  [97.6 °F (36.4 °C)-98.9 °F (37.2 °C)] 98.9 °F (37.2 °C)  HR:  [50-88] 52  BP: ()/(51-92) 125/78  Resp:  [14-42] 20  SpO2:  [97 %-99 %] 99 %  O2 Device: None (Room air)    Physical Exam  HENT:      Right Ear: Hearing normal.      Left Ear: Hearing normal.   Eyes:      General: Lids are normal.      Extraocular Movements: Extraocular movements intact.      Pupils: Pupils are equal, round, and reactive to light.   Neurological:      Deep Tendon Reflexes:      Reflex Scores:       Tricep reflexes are 2+ on the right side and 2+ on the left side.       Bicep reflexes are 2+ on the right side and 2+ on the left side.       Brachioradialis reflexes are 2+ on the right side and 2+ on the left side.       Patellar reflexes are 2+ on the right side and 2+ on the left  side.       Achilles reflexes are 2+ on the right side and 2+ on the left side.  Psychiatric:         Speech: Speech normal.     Neurological Exam  Mental Status  Awake, alert and oriented to person, place and time. Speech is normal. Language is fluent with no aphasia.    Cranial Nerves  CN II: Visual fields full to confrontation.  CN III, IV, VI: Extraocular movements intact bilaterally. Normal lids and orbits bilaterally. Pupils equal round and reactive to light bilaterally.  CN V:  Right: Facial sensation is normal.  Left: Facial sensation is normal on the left.  CN VII:  Right: There is no facial weakness.  Left: There is no facial weakness.  CN VIII:  Right: Hearing is normal.  Left: Hearing is normal.  CN IX, X: Palate elevates symmetrically  CN XI:  Right: Sternocleidomastoid strength is normal. Trapezius strength is normal.  Left: Sternocleidomastoid strength is normal. Trapezius strength is normal.  CN XII: Tongue midline without atrophy or fasciculations.    Motor  Normal muscle bulk throughout. No fasciculations present. Normal muscle tone. No abnormal involuntary movements. No pronator drift.                                             Right                     Left   Shoulder abduction               5                          5  Elbow flexion                         5                          5  Elbow extension                    5                          5  Hip flexion                              5                          5  Knee flexion                           5                          5  Knee extension                      5                          5  Plantarflexion                         5                          5  Dorsiflexion                            5                          5    Sensory  Light touch is normal in upper and lower extremities.     Reflexes                                            Right                      Left  Brachioradialis                    2+                          2+  Biceps                                 2+                         2+  Triceps                                2+                         2+  Patellar                                2+                         2+  Achilles                                2+                         2+    Right pathological reflexes: Missael's absent.  Left pathological reflexes: Missael's absent.    Coordination  Right: Finger-to-nose normal.Left: Finger-to-nose normal.        Lab Results: I have reviewed the following results:  Imaging Results Review: I reviewed radiology reports from this admission including: CT head and CT C-spine.  Other Study Results Review: EKG was reviewed.     VTE Pharmacologic Prophylaxis: VTE covered by:  heparin (porcine), Subcutaneous, 5,000 Units at 10/07/24 1408            Bandar DO Anil  [facility]  Neurology Residency PGY-II

## 2024-10-07 NOTE — PHYSICAL THERAPY NOTE
"                                                                                  PHYSICAL THERAPY NOTE          Patient Name: Tim Alanis Jr.  Today's Date: 10/7/2024     10/07/24 0948   PT Last Visit   PT Visit Date 10/07/24   Note Type   Note type Evaluation   Pain Assessment   Pain Assessment Tool 0-10   Pain Score No Pain   Restrictions/Precautions   Weight Bearing Precautions Per Order No   Other Precautions Impulsive;Chair Alarm;Bed Alarm;Telemetry   Home Living   Type of Home House   Home Layout Multi-level;Other (Comment)  (bed on main floor, bathroom in basement with one flight of stairs.)   Bathroom Shower/Tub Walk-in shower   Bathroom Toilet Standard   Prior Function   Level of [place] Independent with ADLs;Independent with functional mobility;Independent with IADLS   Lives With Family   Receives Help From Family   IADLs Family/Friend/Other provides transportation;Independent with meal prep;Independent with medication management   Falls in the last 6 months 0   Cognition   Orientation Level Oriented X4   Subjective   Subjective \"I just want to go home\"   RLE Assessment   RLE Assessment WNL   LLE Assessment   LLE Assessment WNL   Coordination   Movements are Fluid and Coordinated 1   Light Touch   RLE Light Touch Grossly intact   LLE Light Touch Grossly intact   Bed Mobility   Supine to Sit 5  Supervision   Transfers   Sit to Stand 5  Supervision   Stand to Sit 5  Supervision   Ambulation/Elevation   Gait pattern WNL   Gait Assistance 5  Supervision   Assistive Device None   Distance 5' bed to chair   Stair Management Assistance Not tested   Balance   Static Sitting Good   Dynamic Sitting Good   Static Standing Good   Dynamic Standing Fair +   Ambulatory Fair +   Endurance Deficit   Endurance Deficit No   Activity Tolerance   Activity Tolerance Treatment limited secondary to agitation  (Pt not wanting to ambulate/particpate)   Medical Staff Made Aware OT present for co-evaluation   Nurse Made " "Aware Nursing cleared pt for therapy   Assessment   Prognosis Good   Problem List Decreased safety awareness   Assessment Pt is a 24 y.o. male who presented to the ED after seizures who was eventually transferred to [facility]. Pt  has a past medical history of Seizure (HCC). Pt was alert and oriented and able to confirm home setup information put into chart by care coordination. Prior to admission, patient was fully independent with ADL but does not drive. Patient presented with some impulsivity, but performed bed mobility, transfer and gait to chair under supervision. Pt refused additional ambulation stating he \"just wants to go home.\" From a PT standpoint pt is not in need of any additional skilled therapy in the hospital or post-acute.   Goals   Patient Goals To go home   Short Term Goal #1 No goals needed at this time, pt is cleared for discharge from a PT standpoint. If pt is willing, should ambulate with staff during remainder of hospital stay   Plan   Treatment/Interventions ADL retraining;Functional transfer training;Bed mobility;Gait training;Spoke to nursing;OT;Endurance training   PT Frequency   (Eval only, walk with nursing/restorative staff if patient willing)   Discharge Recommendation   Rehab Resource Intensity Level, PT No post-acute rehabilitation needs   AM-PAC Basic Mobility Inpatient   Turning in Flat Bed Without Bedrails 4   Lying on Back to Sitting on Edge of Flat Bed Without Bedrails 4   Moving Bed to Chair 4   Standing Up From Chair Using Arms 4   Walk in Room 4   Climb 3-5 Stairs With Railing 4   Basic Mobility Inpatient Raw Score 24   Basic Mobility Standardized Score 57.68   [facility] Highest Level Of Mobility   -[facility] Goal 8: Walk 250 feet or more   -[facility] Achieved 4: Move to chair/commode  (Pt refused further ambulation)   End of Consult   Patient Position at End of Consult Bedside chair;Bed/Chair alarm activated;All needs within reach   Cm Mendes, SPT    "

## 2024-10-07 NOTE — PROGRESS NOTES
INTERNAL MEDICINE RESIDENCY TRANSFER ACCEPTANCE NOTE     Name: Tim Alanis Jr.   Age & Sex: 24 y.o. male   MRN: [id number]  Unit/Bed#: [facility] 728-01   Encounter: [id number]  Hospital Stay Days: 1    Accepting team: SOD Team C   Code Status: Level 1 - Full Code  Disposition: Patient requires Med/Surg    ASSESSMENT/PLAN     Active Problems:    Leukocytosis    Status epilepticus (HCC)    Urinary retention    BALWINDER (acute kidney injury) ([facility])    Polysubstance abuse (HCC)      Polysubstance abuse (HCC)  Assessment & Plan  UDS on admission + methampheatimes and THC. UDS in 12/20/23 also positive for meth+THC.     -Follow up with behavioral health    BALWINDER (acute kidney injury) ([facility])  Assessment & Plan  Baseline Cr 0.7-0.9. At presentation Cr was 1.0.  On 10/7, Cr increased to 2.83 --> 2.88 with BUN 23-24. Has been having urinary retention after marion was removed overnight. Now reportedly starting to void more often. Suspect BALWINDER 2/2 obstructive given issues with urinary retention vs possible ATN. Less likely prerenal given BUN/Cr ratio < 20.    Notably, UA at presentation showed moderate 1+ blood but only 0-5 RBC on micro. Suggests possible rhabdo contributing however CK today WNL (234). Started on isolyte 100 cc/hr. Renal US ordered.    -Continue isolyte at 100 cc/hr  -F/u renal US  -Trend BMP and UOP    Urinary retention  Assessment & Plan  Developed urinary retention with BALWINDER overnight. Has had issues voiding since marion was removed. Suspect could be related to sedating medications and immobility vs irritation 2/2 marion placement.     -if unable to void this afternoon, will start intermittant straight caths vs replacing marion  -F/u renal US  -See BALWINDER problem below    Status epilepticus (HCC)  Assessment & Plan  Presented in status epilepticus prompting transfer to \[facility]\"" for neuroICU and vEEG. S/p ativan and keppra load in ED. Intubated for airway protection. Suspect status prompted by medication non-compliance and  substance abuse given UDS + for methampheatimes/THC.    No longer in status. Receiving keppra 1500 mg PO BID and zonisamide 300 mg daily. S/p extubation. vEEG showed no epileptiform discharges however couldn't get full 24 h as patient reportedly ripped off vEEG leads.    -Continue Keppra 1500 mg PO BID and zonisamide 300 daily  -Neuro following, appreciate recs    Leukocytosis  Assessment & Plan  CBC showed WBC of 27.6 at presentation --> 14.52 today. Suspect 2/2 seizures rather than infection. Noted elevated lactate at time of presentation. Blood cultures pending.     Also on ddx is aspiration PNA iso acute encephalopathy (less likely given clear lungs on CXR). UA appears non-infectious. Given rapid improvement, low suspicion for infection at this time.     -Trend CBC  -F/u blood cultures  -Will monitor for any signs/symptoms of infection        VTE Pharmacologic Prophylaxis: Heparin  VTE Mechanical Prophylaxis: sequential compression device    HOSPITAL COURSE     Patient is a 24-year-old male with history of seizure disorder on Keppra/zonisamide, polysubstance abuse, initially presented to [facility] on 10/6 for seizures.  UDS notably positive for THC and methamphetamine. He was treated with Ativan/Keppra, initially intubated due to concern for status epilepticus.  Monitored on video EEG, no seizures captured though did pull off EEG leads by himself.  Otherwise had been maintained on Keppra and zonisamide, and additionally noted to be retaining urine periodically.  Labs morning of 10/7 noted BALWINDER, renal ultrasound ordered per neuro ICU.  Given he was otherwise medically stable, on room air and in no distress, was downgraded to med/surg.    SUBJECTIVE     Patient appears to be in no distress, currently undergoing renal ultrasound.  When did with any particular question, he rolls his eyes and does not contribute to interview or physical examination.    OBJECTIVE     Vitals:    10/07/24 1300 10/07/24 1400  10/07/24 1500 10/07/24 1600   BP:  125/78 122/73 118/70   BP Location:  Right arm  Right arm   Pulse: (!) 52 (!) 52 (!) 44 (!) 48   Resp: 18 20 15 16   Temp:    99.1 °F (37.3 °C)   TempSrc:    Oral   SpO2: 99% 99% 100% 100%     I/O last 24 hours:  In: 691.7 [P.O.:400; I.V.:291.7]  Out: 525 [Urine:525]    Physical Exam  Vitals reviewed.   Constitutional:       General: He is not in acute distress.  HENT:      Head: Normocephalic.      Mouth/Throat:      Mouth: Mucous membranes are moist.      Pharynx: Oropharynx is clear.   Eyes:      Pupils: Pupils are equal, round, and reactive to light.   Cardiovascular:      Rate and Rhythm: Normal rate and regular rhythm.   Pulmonary:      Effort: Pulmonary effort is normal. No respiratory distress.      Breath sounds: Normal breath sounds.   Abdominal:      General: There is no distension.      Palpations: Abdomen is soft.      Tenderness: There is no abdominal tenderness.   Musculoskeletal:      Right lower leg: No edema.      Left lower leg: No edema.   Skin:     General: Skin is warm and dry.   Neurological:      General: No focal deficit present.      Mental Status: He is alert and oriented to person, place, and time. Mental status is at baseline.       LABORATORY DATA     Labs: I have personally reviewed pertinent reports.    Results from last 7 days   Lab Units 10/07/24  0454 10/06/24  0700 10/06/24  0138   WBC Thousand/uL 14.52* 27.36* 27.62*   HEMOGLOBIN g/dL 13.5 14.5 15.2   HEMATOCRIT % 40.7 43.3 48.4   PLATELETS Thousands/uL 218 269 310   SEGS PCT % 72 82* 76*   MONO PCT % 14* 12 9   EOS PCT % 1 0 0      Results from last 7 days   Lab Units 10/07/24  1633 10/07/24  0828 10/07/24  0454 10/06/24  0700 10/06/24  0138   POTASSIUM mmol/L 3.7 4.1 3.9 4.1 3.8   CHLORIDE mmol/L 105 109* 106 108 101   CO2 mmol/L 24 19* 21 23 8*   BUN mg/dL 24 24 23 13 11   CREATININE mg/dL 2.72* 2.88* 2.83* 1.00 1.06   CALCIUM mg/dL 8.9 9.1 9.2 8.8 9.3   ALK PHOS U/L  --   --   --  65 65    ALT U/L  --   --   --  13 15   AST U/L  --   --   --  20 22     Results from last 7 days   Lab Units 10/07/24  0454 10/06/24  0700   MAGNESIUM mg/dL 2.0 2.1     Results from last 7 days   Lab Units 10/07/24  0454 10/06/24  0700   PHOSPHORUS mg/dL 4.8* 2.0*          Results from last 7 days   Lab Units 10/06/24  0419   LACTIC ACID mmol/L 3.3*         Micro:  Lab Results   Component Value Date    BLOODCX No Growth at 24 hrs. 10/06/2024    BLOODCX No Growth at 24 hrs. 10/06/2024    BLOODCX No Growth After 5 Days. 02/13/2024    BLOODCX No Growth After 5 Days. 02/13/2024     IMAGING & DIAGNOSTIC TESTING     Imaging: I have personally reviewed pertinent reports.    US kidney and bladder    Result Date: 10/7/2024  Impression: No hydronephrosis. Floating debris in the bladder noted. Workstation performed: [id number]     X-ray chest 1 view    Result Date: 10/6/2024  Impression: No acute cardiopulmonary disease. ET tube 4 cm above the sudeep. Workstation performed: [id number]     XR chest 1 view portable    Result Date: 10/6/2024  Impression: No acute cardiopulmonary disease. ET tube 5 cm above the sudeep. Workstation performed: [id number]     CT spine cervical without contrast    Result Date: 10/6/2024  Impression: No acute cervical spine fracture or traumatic malalignment. Workstation performed: [id number]     CT head without contrast    Result Date: 10/6/2024  Impression: No acute intracranial abnormality. Workstation performed: [id number]     EKG, Pathology, and Other Studies: I have personally reviewed pertinent reports.     ALLERGIES   No Known Allergies  MEDICATIONS     Current Facility-Administered Medications   Medication Dose Route Frequency Provider Last Rate    acetaminophen  650 mg Oral Q6H PRN Russell Crenshaw PA-C      chlorhexidine  15 mL Mouth/Throat Q12H [facility] Russell Crenshaw PA-C      heparin (porcine)  5,000 Units Subcutaneous Q8H [facility] Russell Crenshaw PA-C      levETIRAcetam  1,500 mg Oral  "Q12H DAYO Russell Crenshaw PA-C      multi-electrolyte  100 mL/hr Intravenous Continuous Russell Crenshaw PA-C 100 mL/hr (10/07/24 1305)    polyethylene glycol  17 g Oral Daily Russell Crenshaw PA-C      senna  8.8 mg Per NG Tube HS Russell Crenshaw PA-C      zonisamide  300 mg Oral Daily Russell Crenshaw PA-C       multi-electrolyte, 100 mL/hr, Last Rate: 100 mL/hr (10/07/24 1305)      acetaminophen, 650 mg, Q6H PRN        Portions of the record may have been created with voice recognition software.  Occasional wrong word or \"sound a like\" substitutions may have occurred due to the inherent limitations of voice recognition software.  Read the chart carefully and recognize, using context, where substitutions have occurred.    ==  Yung Puentes MD  [facility]  Internal Medicine Residency PGY-2  "

## 2024-10-07 NOTE — ASSESSMENT & PLAN NOTE
UDS on admission + methampheatimes and THC. UDS in 12/20/23 also positive for meth+THC.     -Follow up with behavioral health

## 2024-10-07 NOTE — ASSESSMENT & PLAN NOTE
Recent Labs     10/06/24  0138 10/06/24  0700 10/07/24  0454   WBC 27.62* 27.36* 14.52*     Impression: Likely due to multiple convulsive seizures.     Plan  Recommend continue to trend WBC  If leukocytosis persists, will consider additional infectious screen/workup.  Will defer additional workup and management to primary team.

## 2024-10-08 VITALS
DIASTOLIC BLOOD PRESSURE: 79 MMHG | SYSTOLIC BLOOD PRESSURE: 126 MMHG | WEIGHT: 170.64 LBS | TEMPERATURE: 97.3 F | RESPIRATION RATE: 17 BRPM | OXYGEN SATURATION: 99 % | HEART RATE: 60 BPM | BODY MASS INDEX: 22.51 KG/M2

## 2024-10-08 PROBLEM — G40.901 STATUS EPILEPTICUS (HCC): Status: RESOLVED | Noted: 2024-10-06 | Resolved: 2024-10-08

## 2024-10-08 PROBLEM — R33.9 URINARY RETENTION: Status: RESOLVED | Noted: 2024-10-07 | Resolved: 2024-10-08

## 2024-10-08 PROBLEM — D72.829 LEUKOCYTOSIS: Status: RESOLVED | Noted: 2023-09-18 | Resolved: 2024-10-08

## 2024-10-08 LAB
ANION GAP SERPL CALCULATED.3IONS-SCNC: 11 MMOL/L (ref 4–13)
BASOPHILS # BLD AUTO: 0.04 THOUSANDS/ΜL (ref 0–0.1)
BASOPHILS NFR BLD AUTO: 0 % (ref 0–1)
BUN SERPL-MCNC: 20 MG/DL (ref 5–25)
CA-I BLD-SCNC: 1.11 MMOL/L (ref 1.12–1.32)
CALCIUM SERPL-MCNC: 9 MG/DL (ref 8.4–10.2)
CHLORIDE SERPL-SCNC: 106 MMOL/L (ref 96–108)
CO2 SERPL-SCNC: 22 MMOL/L (ref 21–32)
CREAT SERPL-MCNC: 1.99 MG/DL (ref 0.6–1.3)
EOSINOPHIL # BLD AUTO: 0.22 THOUSAND/ΜL (ref 0–0.61)
EOSINOPHIL NFR BLD AUTO: 2 % (ref 0–6)
ERYTHROCYTE [DISTWIDTH] IN BLOOD BY AUTOMATED COUNT: 12.2 % (ref 11.6–15.1)
GFR SERPL CREATININE-BSD FRML MDRD: 46 ML/MIN/1.73SQ M
GLUCOSE SERPL-MCNC: 92 MG/DL (ref 65–140)
HCT VFR BLD AUTO: 41.2 % (ref 36.5–49.3)
HGB BLD-MCNC: 13.6 G/DL (ref 12–17)
IMM GRANULOCYTES # BLD AUTO: 0.04 THOUSAND/UL (ref 0–0.2)
IMM GRANULOCYTES NFR BLD AUTO: 0 % (ref 0–2)
LEVETIRACETAM SERPL-MCNC: 41.7 UG/ML (ref 12–46)
LYMPHOCYTES # BLD AUTO: 1.82 THOUSANDS/ΜL (ref 0.6–4.47)
LYMPHOCYTES NFR BLD AUTO: 19 % (ref 14–44)
MAGNESIUM SERPL-MCNC: 2.3 MG/DL (ref 1.9–2.7)
MCH RBC QN AUTO: 30.3 PG (ref 26.8–34.3)
MCHC RBC AUTO-ENTMCNC: 33 G/DL (ref 31.4–37.4)
MCV RBC AUTO: 92 FL (ref 82–98)
MONOCYTES # BLD AUTO: 1.32 THOUSAND/ΜL (ref 0.17–1.22)
MONOCYTES NFR BLD AUTO: 14 % (ref 4–12)
NEUTROPHILS # BLD AUTO: 6.21 THOUSANDS/ΜL (ref 1.85–7.62)
NEUTS SEG NFR BLD AUTO: 65 % (ref 43–75)
NRBC BLD AUTO-RTO: 0 /100 WBCS
PHOSPHATE SERPL-MCNC: 3.5 MG/DL (ref 2.7–4.5)
PLATELET # BLD AUTO: 212 THOUSANDS/UL (ref 149–390)
PMV BLD AUTO: 11.1 FL (ref 8.9–12.7)
POTASSIUM SERPL-SCNC: 3.8 MMOL/L (ref 3.5–5.3)
RBC # BLD AUTO: 4.49 MILLION/UL (ref 3.88–5.62)
SODIUM SERPL-SCNC: 139 MMOL/L (ref 135–147)
WBC # BLD AUTO: 9.65 THOUSAND/UL (ref 4.31–10.16)

## 2024-10-08 PROCEDURE — 85025 COMPLETE CBC W/AUTO DIFF WBC: CPT

## 2024-10-08 PROCEDURE — 83735 ASSAY OF MAGNESIUM: CPT

## 2024-10-08 PROCEDURE — 83520 IMMUNOASSAY QUANT NOS NONAB: CPT | Performed by: INTERNAL MEDICINE

## 2024-10-08 PROCEDURE — 80048 BASIC METABOLIC PNL TOTAL CA: CPT

## 2024-10-08 PROCEDURE — 82330 ASSAY OF CALCIUM: CPT

## 2024-10-08 PROCEDURE — NC001 PR NO CHARGE: Performed by: INTERNAL MEDICINE

## 2024-10-08 PROCEDURE — 99232 SBSQ HOSP IP/OBS MODERATE 35: CPT | Performed by: INTERNAL MEDICINE

## 2024-10-08 PROCEDURE — 84100 ASSAY OF PHOSPHORUS: CPT

## 2024-10-08 RX ADMIN — HEPARIN SODIUM 5000 UNITS: 5000 INJECTION INTRAVENOUS; SUBCUTANEOUS at 06:27

## 2024-10-08 RX ADMIN — ZONISAMIDE 300 MG: 100 CAPSULE ORAL at 15:28

## 2024-10-08 RX ADMIN — SODIUM CHLORIDE, SODIUM GLUCONATE, SODIUM ACETATE, POTASSIUM CHLORIDE, MAGNESIUM CHLORIDE, SODIUM PHOSPHATE, DIBASIC, AND POTASSIUM PHOSPHATE 100 ML/HR: .53; .5; .37; .037; .03; .012; .00082 INJECTION, SOLUTION INTRAVENOUS at 09:27

## 2024-10-08 RX ADMIN — HEPARIN SODIUM 5000 UNITS: 5000 INJECTION INTRAVENOUS; SUBCUTANEOUS at 14:26

## 2024-10-08 RX ADMIN — LEVETIRACETAM 1500 MG: 750 TABLET, FILM COATED ORAL at 20:52

## 2024-10-08 RX ADMIN — CHLORHEXIDINE GLUCONATE 0.12% ORAL RINSE 15 ML: 1.2 LIQUID ORAL at 09:28

## 2024-10-08 RX ADMIN — LEVETIRACETAM 1500 MG: 750 TABLET, FILM COATED ORAL at 09:28

## 2024-10-08 NOTE — ASSESSMENT & PLAN NOTE
CBC showed WBC of 27.6 at presentation --> 9.65 10/8. Suspect 2/2 seizures rather than infection. Noted elevated lactate at time of presentation. Blood cultures 2/2 negative at 24h.      Also on ddx is aspiration PNA iso acute encephalopathy (less likely given clear lungs on CXR). UA appears non-infectious. Given rapid improvement, low suspicion for infection at this time.     -Trend CBC  -F/u blood cultures  -Will monitor for any signs/symptoms of infection

## 2024-10-08 NOTE — UTILIZATION REVIEW
NOTIFICATION OF INPATIENT ADMISSION      AUTHORIZATION REQUEST   SERVICING FACILITY:   [facility]  Address: [address] [place] [address]  Tax ID: [id number]  NPI: [id number] ATTENDING PROVIDER:  Attending Name and NPI#: Hayes Beverly Md [[id number]]  Address: [address] [place] [address]  Phone: [phone]   ADMISSION INFORMATION:  Place of Service: Inpatient [facility]  Place of Service Code: 21  Inpatient Admission Date/Time: 10/6/24  6:27 AM  Discharge Date/Time: No discharge date for patient encounter.  Admitting Diagnosis Code/Description:  Seizure (HCC) [R56.9]     UTILIZATION REVIEW CONTACT:  Opal Summers Utilization   Network Utilization Review Department  Phone: [phone]  Fax: [phone]  Email: [email or web address]  Contact for approvals/pending authorizations, clinical reviews, and discharge.     PHYSICIAN ADVISORY SERVICES:  Medical Necessity Denial & Kgdz-nt-Ejlt Review  Phone: [phone]  Fax: [phone]  Email: [email or web address]     DISCHARGE SUPPORT TEAM:  For Patients Discharge Needs & Updates  Phone: [phone] opt. 2 Fax: [phone]  Email: [email or web address]

## 2024-10-08 NOTE — CASE MANAGEMENT
Case Management Progress Note    Patient name Tim Alanis Jr.  Location [facility] 728/[facility] 728-01 MRN [id number]  : 2000 Date 10/8/2024       LOS (days): 2  Geometric Mean LOS (GMLOS) (days):   Days to GMLOS:        OBJECTIVE:        Current admission status: Inpatient  Preferred Pharmacy:   [id number] - [place], [place] - [address]  [address]  [place] [address]  Phone: [phone] Fax: [phone]    [facility] [place], [place] - [address] [facility]  [address] [facility] [address]  Phone: [phone] Fax: [phone]    Primary Care Provider: No primary care provider on file.    Primary Insurance: [employer]  Secondary Insurance:     PROGRESS NOTE:  CM spoke with pt bedside re: [facility] referral. Pt is in agreement with assessment. CM advised that a search in [facility] can be completed. CM faxed requested documentation to [phone]

## 2024-10-08 NOTE — ASSESSMENT & PLAN NOTE
Presented in status epilepticus prompting transfer to [facility] for neuroICU and vEEG. S/p ativan and keppra load in ED. Intubated for airway protection. Suspect status prompted by medication non-compliance and substance abuse given UDS + for methampheatimes/THC.    No longer in status. Receiving keppra 1500 mg PO BID and zonisamide 300 mg daily. S/p extubation. vEEG showed no epileptiform discharges however couldn't get full 24 h as patient reportedly ripped off vEEG leads.    -Continue Keppra 1500 mg PO BID and zonisamide 300 daily  -Cleared from neuro perspective provide patient does not have any further seizure activity

## 2024-10-08 NOTE — CASE MANAGEMENT
Case Management Discharge Planning Note    Patient name Tim Alanis Jr.  Location [facility] 728/[facility] 728-01 MRN [id number]  : 2000 Date 10/8/2024       Current Admission Date: 10/6/2024  Current Admission Diagnosis:Status epilepticus (HCC)   Patient Active Problem List    Diagnosis Date Noted Date Diagnosed    Urinary retention 10/07/2024     BALWINDER (acute kidney injury) ([facility]) 10/07/2024     Polysubstance abuse (HCC) 10/07/2024     Status epilepticus (HCC) 10/06/2024     Breakthrough seizure ([facility]) 2024     Epileptic seizure, generalized ([facility]) 2023     Lactic acidosis 2023     Leukocytosis 2023     Drug use 2023       LOS (days): 2  Geometric Mean LOS (GMLOS) (days):   Days to GMLOS:     OBJECTIVE:  Risk of Unplanned Readmission Score: 15.89         Current admission status: Inpatient   Preferred Pharmacy:   [id number] - [place], [place] - [address]  [address]  [place] [address]  Phone: [phone] Fax: [phone]    [facility] [address] [place] [address] [address] [facility]  [address] [facility] [address]  Phone: [phone] Fax: [phone]    Primary Care Provider: No primary care provider on file.    Primary Insurance: [employer]  Secondary Insurance:     DISCHARGE DETAILS:      Additional Comments: Reviewed with . Pt resides in [place] and doesn't drive. CM left message with HOST to see if they are able to do search in [facility] for OP

## 2024-10-08 NOTE — RESTORATIVE TECHNICIAN NOTE
Restorative Technician Note      Patient Name: Tim Alanis Jr.     Note Type: Mobility  Patient Position Upon Consult: Supine  Activity Performed: Ambulated; Dangled; Stood  Assistive Device: Other (Comment) (none)  Education Provided: Yes  Patient Position at End of Consult: Supine; All needs within reach; Bed/Chair alarm activated    Lesly JETER, Restorative Technician,

## 2024-10-08 NOTE — ASSESSMENT & PLAN NOTE
Baseline Cr 0.7-0.9. At presentation Cr was 1.0.  On 10/7, Cr increased to 2.83 --> 2.88 with BUN 23-24. Has been having urinary retention after marion was removed overnight. Now reportedly starting to void more often, 10/8 creatinine downtrending to 1.99 after patient regained urinary function.  Suspect BALWINDER 2/2 obstructive given issues with urinary retention vs possible ATN. Less likely prerenal given BUN/Cr ratio < 20.    Notably, UA at presentation showed moderate 1+ blood but only 0-5 RBC on micro. Suggests possible rhabdo contributing however CK today WNL (234).     -Continue isolyte at 100 cc/hr until 6 PM  -Trend BMP and UOP

## 2024-10-08 NOTE — ASSESSMENT & PLAN NOTE
Developed urinary retention with BALWINDER overnight. Has had issues voiding since marion was removed. Suspect could be related to sedating medications and immobility vs irritation 2/2 marion placement. Renal ultrasound was largely negative and only found floating echogenic debris in bladder lumen, of note patient was able to urinate prior to ultrasound.  Pt denies any urinary sxs today and endorses voiding appropriately.     -Continue IV fluids until 6 pm tonight   -F/u morning renal labs   -Bowel regimen in place  -See BALWINDER problem above

## 2024-10-08 NOTE — PROGRESS NOTES
Progress Note - Internal Medicine   Name: Tim Alanis Jr. 24 y.o. male I MRN: [id number]  Unit/Bed#: [facility] 728-01 I Date of Admission: 10/6/2024   Date of Service: 10/8/2024 I Hospital Day: 2     Assessment & Plan  Status epilepticus (HCC)  Presented in status epilepticus prompting transfer to [facility] for neuroICU and vEEG. S/p ativan and keppra load in ED. Intubated for airway protection. Suspect status prompted by medication non-compliance and substance abuse given UDS + for methampheatimes/THC.    No longer in status. Receiving keppra 1500 mg PO BID and zonisamide 300 mg daily. S/p extubation. vEEG showed no epileptiform discharges however couldn't get full 24 h as patient reportedly ripped off vEEG leads.    -Continue Keppra 1500 mg PO BID and zonisamide 300 daily  -Cleared from neuro perspective provide patient does not have any further seizure activity    BALWINDER (acute kidney injury) ([facility])  Baseline Cr 0.7-0.9. At presentation Cr was 1.0.  On 10/7, Cr increased to 2.83 --> 2.88 with BUN 23-24. Has been having urinary retention after marion was removed overnight. Now reportedly starting to void more often, 10/8 creatinine downtrending to 1.99 after patient regained urinary function.  Suspect BALWINDER 2/2 obstructive given issues with urinary retention vs possible ATN. Less likely prerenal given BUN/Cr ratio < 20.    Notably, UA at presentation showed moderate 1+ blood but only 0-5 RBC on micro. Suggests possible rhabdo contributing however CK today WNL (234).     -Continue isolyte at 100 cc/hr until 6 PM  -Trend BMP and UOP    Urinary retention  Developed urinary retention with BALWINDER overnight. Has had issues voiding since marion was removed. Suspect could be related to sedating medications and immobility vs irritation 2/2 marion placement. Renal ultrasound was largely negative and only found floating echogenic debris in bladder lumen, of note patient was able to urinate prior to ultrasound.  Pt denies any urinary sxs  today and endorses voiding appropriately.     -Continue IV fluids until 6 pm tonight   -F/u morning renal labs   -Bowel regimen in place  -See BALWINDER problem above    Leukocytosis  CBC showed WBC of 27.6 at presentation --> 9.65 10/8. Suspect 2/2 seizures rather than infection. Noted elevated lactate at time of presentation. Blood cultures 2/2 negative at 24h.      Also on ddx is aspiration PNA iso acute encephalopathy (less likely given clear lungs on CXR). UA appears non-infectious. Given rapid improvement, low suspicion for infection at this time.     -Trend CBC  -F/u blood cultures  -Will monitor for any signs/symptoms of infection    Polysubstance abuse (HCC)  UDS on admission + methampheatimes and THC. UDS in 23 also positive for meth+THC.     -Follow up with behavioral health    Disposition: Pending improvement in renal function, likely discharge tomorrow.    SUBJECTIVE     Patient seen and examined. No acute events overnight. Pt states they are feeling much better overall and have no sxs and states they feel ready to go home. Pt denies cp, abd pain, sob, fevers/chills, nausea/voming, diarrhea, constipation, incomplete urinary voiding,  dysuria, and hematuria.  Patient has reported ability to urinate on his own and tolerating p.o. intake.    OBJECTIVE     Vitals:    10/07/24 1600 10/07/24 2346 10/08/24 0551 10/08/24 0727   BP: 118/70 123/70  123/71   BP Location: Right arm      Pulse: (!) 48 76  79   Resp: 16   16   Temp: 99.1 °F (37.3 °C) 98 °F (36.7 °C)  98 °F (36.7 °C)   TempSrc: Oral      SpO2: 100% 97%  98%   Weight:   77.4 kg (170 lb 10.2 oz)       Temperature:   Temp (24hrs), Av.4 °F (36.9 °C), Min:98 °F (36.7 °C), Max:99.1 °F (37.3 °C)    Temperature: 98 °F (36.7 °C)  Intake & Output:  I/O         10/06 0701  10/07 0700 10/07 0701  10/08 0700 10/08 0701  10/09 0700    P.O. 200 200     I.V. (mL/kg) 268 291.7 (3.8)     NG/      IV Piggyback 250      Total Intake(mL/kg) 948 491.7 (6.4)      Urine (mL/kg/hr) 965 525 (0.3)     Emesis/NG output 0      Stool 0      Total Output 965 525     Net -17 -33.3            Unmeasured Urine Occurrence 1 x      Unmeasured Stool Occurrence 0 x            Weights:        Body mass index is 22.51 kg/m².  Weight (last 2 days)       Date/Time Weight    10/08/24 0551 77.4 (170.64)          Physical Exam  Vitals and nursing note reviewed.   Constitutional:       General: He is not in acute distress.     Appearance: He is well-developed.   HENT:      Head: Normocephalic and atraumatic.   Eyes:      General: No scleral icterus.        Right eye: No discharge.         Left eye: No discharge.      Conjunctiva/sclera: Conjunctivae normal.   Cardiovascular:      Rate and Rhythm: Normal rate and regular rhythm.      Heart sounds: No murmur heard.     No friction rub. No gallop.   Pulmonary:      Effort: Pulmonary effort is normal. No respiratory distress.      Breath sounds: Normal breath sounds. No wheezing, rhonchi or rales.   Abdominal:      Palpations: Abdomen is soft.      Tenderness: There is no abdominal tenderness. There is no right CVA tenderness, left CVA tenderness or guarding.   Musculoskeletal:      Cervical back: Neck supple.      Right lower leg: No edema.      Left lower leg: No edema.   Skin:     General: Skin is warm and dry.      Capillary Refill: Capillary refill takes less than 2 seconds.   Neurological:      Mental Status: He is alert.   Psychiatric:         Mood and Affect: Mood normal.       LABORATORY DATA     Labs: I have personally reviewed pertinent reports.  Results from last 7 days   Lab Units 10/08/24  0622 10/07/24  0454 10/06/24  0700   WBC Thousand/uL 9.65 14.52* 27.36*   HEMOGLOBIN g/dL 13.6 13.5 14.5   HEMATOCRIT % 41.2 40.7 43.3   PLATELETS Thousands/uL 212 218 269   SEGS PCT % 65 72 82*   MONO PCT % 14* 14* 12   EOS PCT % 2 1 0      Results from last 7 days   Lab Units 10/08/24  0622 10/07/24  1633 10/07/24  0828 10/07/24  0454 10/06/24  0700  10/06/24  0138   POTASSIUM mmol/L 3.8 3.7 4.1   < > 4.1 3.8   CHLORIDE mmol/L 106 105 109*   < > 108 101   CO2 mmol/L 22 24 19*   < > 23 8*   BUN mg/dL 20 24 24   < > 13 11   CREATININE mg/dL 1.99* 2.72* 2.88*   < > 1.00 1.06   CALCIUM mg/dL 9.0 8.9 9.1   < > 8.8 9.3   ALK PHOS U/L  --   --   --   --  65 65   ALT U/L  --   --   --   --  13 15   AST U/L  --   --   --   --  20 22    < > = values in this interval not displayed.     Results from last 7 days   Lab Units 10/08/24  0622 10/07/24  0454 10/06/24  0700   MAGNESIUM mg/dL 2.3 2.0 2.1     Results from last 7 days   Lab Units 10/08/24  0622 10/07/24  0454 10/06/24  0700   PHOSPHORUS mg/dL 3.5 4.8* 2.0*          Results from last 7 days   Lab Units 10/06/24  0419   LACTIC ACID mmol/L 3.3*           IMAGING & DIAGNOSTIC TESTING     Radiology Results: I have personally reviewed pertinent reports.  US kidney and bladder    Result Date: 10/7/2024  Impression: No hydronephrosis. Floating debris in the bladder noted. Workstation performed: [id number]     X-ray chest 1 view    Result Date: 10/6/2024  Impression: No acute cardiopulmonary disease. ET tube 4 cm above the sudeep. Workstation performed: [id number]     XR chest 1 view portable    Result Date: 10/6/2024  Impression: No acute cardiopulmonary disease. ET tube 5 cm above the sudeep. Workstation performed: [id number]     CT spine cervical without contrast    Result Date: 10/6/2024  Impression: No acute cervical spine fracture or traumatic malalignment. Workstation performed: [id number]     CT head without contrast    Result Date: 10/6/2024  Impression: No acute intracranial abnormality. Workstation performed: [id number]     Other Diagnostic Testing: I have personally reviewed pertinent reports.    ACTIVE MEDICATIONS       Current Facility-Administered Medications:     acetaminophen (TYLENOL) tablet 650 mg, Q6H PRN    chlorhexidine (PERIDEX) 0.12 % oral rinse 15 mL, Q12H DAYO    heparin (porcine) subcutaneous  "injection 5,000 Units, Q8H DAYO    levETIRAcetam (KEPPRA) tablet 1,500 mg, Q12H DAYO    multi-electrolyte (PLASMALYTE-A/ISOLYTE-S PH 7.4) IV solution, Continuous, Last Rate: 100 mL/hr (10/08/24 0927)    polyethylene glycol (MIRALAX) packet 17 g, Daily    senna oral syrup 8.8 mg, HS    zonisamide (ZONEGRAN) capsule 300 mg, Daily    VTE Pharmacologic Prophylaxis: Heparin  VTE Mechanical Prophylaxis: sequential compression device and foot pump applied    Portions of the record may have been created with voice recognition software.  Occasional wrong word or \"sound a like\" substitutions may have occurred due to the inherent limitations of voice recognition software.  Read the chart carefully and recognize, using context, where substitutions have occurred.   "

## 2024-10-08 NOTE — CERTIFIED RECOVERY SPECIALIST
Certified  Note    Patient name: Tim Alanis Jr.  Location: [facility] 728/[facility] 728-01  [place]: [facility]  Attending:  Hayes Beverly* MRN [id number]  : 2000  Age: 24 y.o.    Sex: male Date 10/8/2024         Substance Use History:     Social History     Substance and Sexual Activity   Alcohol Use Yes    Comment: sociall        Social History     Substance and Sexual Activity   Drug Use Not Currently    Types: Marijuana    Comment: per mother pt has history of meth amphetamine abuse       Time spent: 15 min   Consult rcvd: Y  Patient seen in: IP  Stage of change:  pre-contemplation  Substance used:Meth      CRS provided introductions and explanation of service. CRS and patient engaged in conversation of hospitalization. Patient discussed needs he feels would be appropriate. CRS shared understanding.     CRS and patient discussed patients medical condition (seizures) and patient substance use. Patient explains that he uses meth and smokes THC, reports his mother is aware and he talks to her about his use. Patient stated that he was diagnosed with depression and ADHD a long time ago. Patient isnot medicated at this time.     Patient lives at home with mother, can not drive and currently unemployed to due seizures.  Patient is not interested in going to IP  treatment. Patient states he is open to therapy and seeing a psychiatrist     CRS will continue to follow until discharge. CRS spoke to CM and explained above     Resources and contact information given     -  Teresa Corey

## 2024-10-08 NOTE — PLAN OF CARE
Problem: PAIN - ADULT  Goal: Verbalizes/displays adequate comfort level or baseline comfort level  Description: Interventions:  - Encourage patient to monitor pain and request assistance  - Assess pain using appropriate pain scale  - Administer analgesics based on type and severity of pain and evaluate response  - Implement non-pharmacological measures as appropriate and evaluate response  - Consider cultural and social influences on pain and pain management  - Notify physician/advanced practitioner if interventions unsuccessful or patient reports new pain  Outcome: Progressing     Problem: INFECTION - ADULT  Goal: Absence or prevention of progression during hospitalization  Description: INTERVENTIONS:  - Assess and monitor for signs and symptoms of infection  - Monitor lab/diagnostic results  - Monitor all insertion sites, i.e. indwelling lines, tubes, and drains  - Monitor endotracheal if appropriate and nasal secretions for changes in amount and color  - [place] appropriate cooling/warming therapies per order  - Administer medications as ordered  - Instruct and encourage patient and family to use good hand hygiene technique  - Identify and instruct in appropriate isolation precautions for identified infection/condition  Outcome: Progressing  Goal: Absence of fever/infection during neutropenic period  Description: INTERVENTIONS:  - Monitor WBC    Outcome: Progressing     Problem: SAFETY ADULT  Goal: Patient will remain free of falls  Description: INTERVENTIONS:  - Educate patient/family on patient safety including physical limitations  - Instruct patient to call for assistance with activity   - Consult OT/PT to assist with strengthening/mobility   - Keep Call bell within reach  - Keep bed low and locked with side rails adjusted as appropriate  - Keep care items and personal belongings within reach  - Initiate and maintain comfort rounds  - Make Fall Risk Sign visible to staff  - Offer Toileting every  Hours,  in advance of need  - Initiate/Maintain alarm  - Obtain necessary fall risk management equipment:   - Apply yellow socks and bracelet for high fall risk patients  - Consider moving patient to room near nurses station  Outcome: Progressing  Goal: Maintain or return to baseline ADL function  Description: INTERVENTIONS:  -  Assess patient's ability to carry out ADLs; assess patient's baseline for ADL function and identify physical deficits which impact ability to perform ADLs (bathing, care of mouth/teeth, toileting, grooming, dressing, etc.)  - Assess/evaluate cause of self-care deficits   - Assess range of motion  - Assess patient's mobility; develop plan if impaired  - Assess patient's need for assistive devices and provide as appropriate  - Encourage maximum independence but intervene and supervise when necessary  - Involve family in performance of ADLs  - Assess for home care needs following discharge   - Consider OT consult to assist with ADL evaluation and planning for discharge  - Provide patient education as appropriate  Outcome: Progressing  Goal: Maintains/Returns to pre admission functional level  Description: INTERVENTIONS:  - Perform AM-PAC 6 Click Basic Mobility/ Daily Activity assessment daily.  - Set and communicate daily mobility goal to care team and patient/family/caregiver.   - Collaborate with rehabilitation services on mobility goals if consulted  - Perform Range of Motion  times a day.  - Reposition patient every  hours.  - Dangle patient  times a day  - Stand patient  times a day  - Ambulate patient  times a day  - Out of bed to chair  times a day   - Out of bed for meals  times a day  - Out of bed for toileting  - Record patient progress and toleration of activity level   Outcome: Progressing     Problem: DISCHARGE PLANNING  Goal: Discharge to home or other facility with appropriate resources  Description: INTERVENTIONS:  - Identify barriers to discharge w/patient and caregiver  - Arrange for  needed discharge resources and transportation as appropriate  - Identify discharge learning needs (meds, wound care, etc.)  - Arrange for interpretive services to assist at discharge as needed  - Refer to Case Management Department for coordinating discharge planning if the patient needs post-hospital services based on physician/advanced practitioner order or complex needs related to functional status, cognitive ability, or social support system  Outcome: Progressing     Problem: Knowledge Deficit  Goal: Patient/family/caregiver demonstrates understanding of disease process, treatment plan, medications, and discharge instructions  Description: Complete learning assessment and assess knowledge base.  Interventions:  - Provide teaching at level of understanding  - Provide teaching via preferred learning methods  Outcome: Progressing     Problem: SAFETY,RESTRAINT: NV/NON-SELF DESTRUCTIVE BEHAVIOR  Goal: Remains free of harm/injury (restraint for non violent/non self-detsructive behavior)  Description: INTERVENTIONS:  - Instruct patient/family regarding restraint use   - Assess and monitor physiologic and psychological status   - Provide interventions and comfort measures to meet assessed patient needs   - Identify and implement measures to help patient regain control  - Assess readiness for release of restraint   Outcome: Progressing  Goal: Returns to optimal restraint-free functioning  Description: INTERVENTIONS:  - Assess the patient's behavior and symptoms that indicate continued need for restraint  - Identify and implement measures to help patient regain control  - Assess readiness for release of restraint   Outcome: Progressing     Problem: Potential for Falls  Goal: Patient will remain free of falls  Description: INTERVENTIONS:  - Educate patient/family on patient safety including physical limitations  - Instruct patient to call for assistance with activity   - Consult OT/PT to assist with strengthening/mobility    - Keep Call bell within reach  - Keep bed low and locked with side rails adjusted as appropriate  - Keep care items and personal belongings within reach  - Initiate and maintain comfort rounds  - Make Fall Risk Sign visible to staff  - Offer Toileting every  Hours, in advance of need  - Initiate/Maintain alarm  - Obtain necessary fall risk management equipment:   - Apply yellow socks and bracelet for high fall risk patients  - Consider moving patient to room near nurses station  Outcome: Progressing     Problem: Prexisting or High Potential for Compromised Skin Integrity  Goal: Skin integrity is maintained or improved  Description: INTERVENTIONS:  - Identify patients at risk for skin breakdown  - Assess and monitor skin integrity  - Assess and monitor nutrition and hydration status  - Monitor labs   - Assess for incontinence   - Turn and reposition patient  - Assist with mobility/ambulation  - Relieve pressure over bony prominences  - Avoid friction and shearing  - Provide appropriate hygiene as needed including keeping skin clean and dry  - Evaluate need for skin moisturizer/barrier cream  - Collaborate with interdisciplinary team   - Patient/family teaching  - Consider wound care consult   Outcome: Progressing

## 2024-10-09 LAB — ZONISAMIDE SERPL-MCNC: 3.4 UG/ML (ref 10–40)

## 2024-10-09 NOTE — NURSING NOTE
Patient and mother wanted to be discharged now.  They did not want the HOST program.  They were seen by the doctors and signed the AMA paper.  Patient given his PO Keppra prior to discharge.  No scripts given.  Patient stated he had all his belongings.  Patient walked out with mother for home.

## 2024-10-09 NOTE — UTILIZATION REVIEW
NOTIFICATION OF ADMISSION DISCHARGE   This is a Notification of Discharge from [facility]. Please be advised that this patient has been discharge from our facility. Below you will find the admission and discharge date and time including the patient’s disposition.   UTILIZATION REVIEW CONTACT:  Opal Summers  Utilization   Network Utilization Review Department  Phone: [phone] x carefully listen to the prompts. All voicemails are confidential.  Email: [email or web address]     ADMISSION INFORMATION  PRESENTATION DATE: 10/6/2024  6:27 AM  OBERVATION ADMISSION DATE: N/A  INPATIENT ADMISSION DATE: 10/6/24  6:27 AM   DISCHARGE DATE: 10/8/2024  9:05 PM   DISPOSITION:Left against medical advice or discontinued care    Network Utilization Review Department  ATTENTION: Please call with any questions or concerns to [phone] and carefully listen to the prompts so that you are directed to the right person. All voicemails are confidential.   For Discharge needs, contact Care Management DC Support Team at [phone] opt. 2  Send all requests for admission clinical reviews, approved or denied determinations and any other requests to dedicated fax number below belonging to the campus where the patient is receiving treatment. List of dedicated fax numbers for the Facilities:  FACILITY NAME UR FAX NUMBER   ADMISSION DENIALS (Administrative/Medical Necessity) [phone]   DISCHARGE SUPPORT TEAM ([facility]) [phone]   PARENT CHILD HEALTH (Maternity/NICU/Pediatrics) [phone]   [facility] [phone]   [facility] [phone]   [facility] [phone]   [facility] [phone]   [facility] [phone]   [facility] [phone]   [facility] [phone]   [facility]  [place] [facility] [phone]   [facility] [phone]   [facility] [phone]   [facility] [phone]   [facility] [phone]          Bexarotene Pregnancy And Lactation Text: This medication is Pregnancy Category X and should not be given to women who are pregnant or may become pregnant. This medication should not be used if you are breast feeding.

## 2024-10-09 NOTE — ASSESSMENT & PLAN NOTE
Baseline Cr 0.7-0.9. At presentation Cr was 1.0.  On 10/7, Cr increased to 2.83 --> 2.88 with BUN 23-24. Has been having urinary retention after marion was removed overnight. Now reportedly starting to void more often, 10/8 creatinine downtrending to 1.99 after patient regained urinary function.  Suspect BALWINDER 2/2 obstructive given issues with urinary retention vs possible ATN. Less likely prerenal given BUN/Cr ratio < 20.  Renal ultrasound negative for acute structural pathology.    Suspect secondary to acute seizure event.  Creatinine was improving with ongoing hydration; however, patient ultimately elected to leave A on 10/8 despite knowing the risks of worsening renal function, recurrent seizure/status epilepticus, worsening overall illness, and potential demise.    Plan  -Encourage hydration on discharge  -Repeat BMP ordered in 1 week from discharge to ensure renal function not worsening  -Strongly encouraged patient to avoid further substance use, comply with home AEDs, and establish with a local PCP for hospital follow-up

## 2024-10-09 NOTE — PLAN OF CARE
Problem: PAIN - ADULT  Goal: Verbalizes/displays adequate comfort level or baseline comfort level  Description: Interventions:  - Encourage patient to monitor pain and request assistance  - Assess pain using appropriate pain scale  - Administer analgesics based on type and severity of pain and evaluate response  - Implement non-pharmacological measures as appropriate and evaluate response  - Consider cultural and social influences on pain and pain management  - Notify physician/advanced practitioner if interventions unsuccessful or patient reports new pain  Outcome: Completed     Problem: INFECTION - ADULT  Goal: Absence or prevention of progression during hospitalization  Description: INTERVENTIONS:  - Assess and monitor for signs and symptoms of infection  - Monitor lab/diagnostic results  - Monitor all insertion sites, i.e. indwelling lines, tubes, and drains  - Monitor endotracheal if appropriate and nasal secretions for changes in amount and color  - [place] appropriate cooling/warming therapies per order  - Administer medications as ordered  - Instruct and encourage patient and family to use good hand hygiene technique  - Identify and instruct in appropriate isolation precautions for identified infection/condition  Outcome: Completed  Goal: Absence of fever/infection during neutropenic period  Description: INTERVENTIONS:  - Monitor WBC    Outcome: Completed     Problem: SAFETY ADULT  Goal: Patient will remain free of falls  Description: INTERVENTIONS:  - Educate patient/family on patient safety including physical limitations  - Instruct patient to call for assistance with activity   - Consult OT/PT to assist with strengthening/mobility   - Keep Call bell within reach  - Keep bed low and locked with side rails adjusted as appropriate  - Keep care items and personal belongings within reach  - Initiate and maintain comfort rounds  - Make Fall Risk Sign visible to staff  - Offer Toileting every 2 Hours, in  advance of need  - Initiate/Maintain bed alarm  - Obtain necessary fall risk management equipment:    - Apply yellow socks and bracelet for high fall risk patients  - Consider moving patient to room near nurses station  Outcome: Completed  Goal: Maintain or return to baseline ADL function  Description: INTERVENTIONS:  -  Assess patient's ability to carry out ADLs; assess patient's baseline for ADL function and identify physical deficits which impact ability to perform ADLs (bathing, care of mouth/teeth, toileting, grooming, dressing, etc.)  - Assess/evaluate cause of self-care deficits   - Assess range of motion  - Assess patient's mobility; develop plan if impaired  - Assess patient's need for assistive devices and provide as appropriate  - Encourage maximum independence but intervene and supervise when necessary  - Involve family in performance of ADLs  - Assess for home care needs following discharge   - Consider OT consult to assist with ADL evaluation and planning for discharge  - Provide patient education as appropriate  Outcome: Completed  Goal: Maintains/Returns to pre admission functional level  Description: INTERVENTIONS:  - Perform AM-PAC 6 Click Basic Mobility/ Daily Activity assessment daily.  - Set and communicate daily mobility goal to care team and patient/family/caregiver.   - Collaborate with rehabilitation services on mobility goals if consulted  - Perform Range of Motion 4 times a day.  - Reposition patient every 2 hours.  - Dangle patient 4 times a day  - Stand patient 4 times a day  - Ambulate patient 4 times a day  - Out of bed to chair 4 times a day   - Out of bed for meals 3 times a day  - Out of bed for toileting  - Record patient progress and toleration of activity level   Outcome: Completed     Problem: DISCHARGE PLANNING  Goal: Discharge to home or other facility with appropriate resources  Description: INTERVENTIONS:  - Identify barriers to discharge w/patient and caregiver  - Arrange  for needed discharge resources and transportation as appropriate  - Identify discharge learning needs (meds, wound care, etc.)  - Arrange for interpretive services to assist at discharge as needed  - Refer to Case Management Department for coordinating discharge planning if the patient needs post-hospital services based on physician/advanced practitioner order or complex needs related to functional status, cognitive ability, or social support system  Outcome: Completed     Problem: Knowledge Deficit  Goal: Patient/family/caregiver demonstrates understanding of disease process, treatment plan, medications, and discharge instructions  Description: Complete learning assessment and assess knowledge base.  Interventions:  - Provide teaching at level of understanding  - Provide teaching via preferred learning methods  Outcome: Completed     Problem: SAFETY,RESTRAINT: NV/NON-SELF DESTRUCTIVE BEHAVIOR  Goal: Remains free of harm/injury (restraint for non violent/non self-detsructive behavior)  Description: INTERVENTIONS:  - Instruct patient/family regarding restraint use   - Assess and monitor physiologic and psychological status   - Provide interventions and comfort measures to meet assessed patient needs   - Identify and implement measures to help patient regain control  - Assess readiness for release of restraint   Outcome: Completed  Goal: Returns to optimal restraint-free functioning  Description: INTERVENTIONS:  - Assess the patient's behavior and symptoms that indicate continued need for restraint  - Identify and implement measures to help patient regain control  - Assess readiness for release of restraint   Outcome: Completed     Problem: Potential for Falls  Goal: Patient will remain free of falls  Description: INTERVENTIONS:  - Educate patient/family on patient safety including physical limitations  - Instruct patient to call for assistance with activity   - Consult OT/PT to assist with strengthening/mobility   -  Keep Call bell within reach  - Keep bed low and locked with side rails adjusted as appropriate  - Keep care items and personal belongings within reach  - Initiate and maintain comfort rounds  - Make Fall Risk Sign visible to staff  - Offer Toileting every 2 Hours, in advance of need  - Initiate/Maintain bed alarm  - Obtain necessary fall risk management equipment:    - Apply yellow socks and bracelet for high fall risk patients  - Consider moving patient to room near nurses station  Outcome: Completed     Problem: Prexisting or High Potential for Compromised Skin Integrity  Goal: Skin integrity is maintained or improved  Description: INTERVENTIONS:  - Identify patients at risk for skin breakdown  - Assess and monitor skin integrity  - Assess and monitor nutrition and hydration status  - Monitor labs   - Assess for incontinence   - Turn and reposition patient  - Assist with mobility/ambulation  - Relieve pressure over bony prominences  - Avoid friction and shearing  - Provide appropriate hygiene as needed including keeping skin clean and dry  - Evaluate need for skin moisturizer/barrier cream  - Collaborate with interdisciplinary team   - Patient/family teaching  - Consider wound care consult   Outcome: Completed

## 2024-10-09 NOTE — DISCHARGE INSTR - AVS FIRST PAGE
-Please obtain repeat labs in approximately 1 week to ensure that your renal function is improving  -Please follow-up with neurology on discharge for further adjustments to your AED regimen  -We strongly recommend establishing with a primary care physician near your place of residence  -We strongly recommend avoiding any methamphetamine use or other substance use which could increase the likelihood of seizure events  -Please return to the hospital with any recurrent symptoms

## 2024-10-09 NOTE — ASSESSMENT & PLAN NOTE
UDS on admission + methampheatimes and THC. UDS in 12/20/23 also positive for meth+THC.  Patient ultimately declined HOST referral and elected to leave AMA.    Plan  -Strongly encouraged substance use cessation on discharge

## 2024-10-09 NOTE — ASSESSMENT & PLAN NOTE
Patient was initially transferred to [facility] under the neuro ICU service with concern for status epilepticus.  Video EEG performed this admission did not show any acute seizure events although patient did self remove his EEG leads following intubation.  Evaluated by neurology during this admission and ultimately recommended to continue home Keppra and zonisamide.    Patient ultimately elected to leave [place] on 10/8 despite risks of worsening seizures, recurrent status epilepticus, worsening illness, and potential death.    Plan  -Continue Keppra and zonisamide on discharge  -Strongly encouraged patient to follow with his regular neurologist in [place]  -No driving until cleared by neurology

## 2024-10-09 NOTE — DISCHARGE SUMMARY
INTERNAL MEDICINE RESIDENCY DISCHARGE SUMMARY     Tim Alanis Jr.   24 y.o. male  MRN: [id number]  Room/Bed: [facility] 728/[facility] 728-01     [facility]    Encounter: [id number]    Principal Problem:    Epileptic seizure, generalized (HCC)  Active Problems:    BALWINDER (acute kidney injury) ([facility])    Polysubstance abuse ([facility])      * Epileptic seizure, generalized ([facility])  Assessment & Plan  Patient was initially transferred to [facility] under the neuro ICU service with concern for status epilepticus.  Video EEG performed this admission did not show any acute seizure events although patient did self remove his EEG leads following intubation.  Evaluated by neurology during this admission and ultimately recommended to continue home Keppra and zonisamide.    Patient ultimately elected to leave AMA on 10/8 despite risks of worsening seizures, recurrent status epilepticus, worsening illness, and potential death.    Plan  -Continue Keppra and zonisamide on discharge  -Strongly encouraged patient to follow with his regular neurologist in [place]  -No driving until cleared by neurology    BALWINDER (acute kidney injury) ([facility])  Assessment & Plan  Baseline Cr 0.7-0.9. At presentation Cr was 1.0.  On 10/7, Cr increased to 2.83 --> 2.88 with BUN 23-24. Has been having urinary retention after marion was removed overnight. Now reportedly starting to void more often, 10/8 creatinine downtrending to 1.99 after patient regained urinary function.  Suspect BALWINDER 2/2 obstructive given issues with urinary retention vs possible ATN. Less likely prerenal given BUN/Cr ratio < 20.  Renal ultrasound negative for acute structural pathology.    Suspect secondary to acute seizure event.  Creatinine was improving with ongoing hydration; however, patient ultimately elected to leave AMA on 10/8 despite knowing the risks of worsening renal function, recurrent seizure/status epilepticus, worsening overall illness, and potential  "demise.    Plan  -Encourage hydration on discharge  -Repeat BMP ordered in 1 week from discharge to ensure renal function not worsening  -Strongly encouraged patient to avoid further substance use, comply with home AEDs, and establish with a local PCP for hospital follow-up      Polysubstance abuse (HCC)  Assessment & Plan  UDS on admission + methampheatimes and THC. UDS in 12/20/23 also positive for meth+THC.  Patient ultimately declined HOST referral and elected to leave AMA.    Plan  -Strongly encouraged substance use cessation on discharge        DETAILS OF HOSPITAL COURSE     Per H&P by Russell Crenshaw PA-C: \"Tim Alanis Jr. is a 24 y.o. male with PMHx seizure disorder presented to [facility] ED earlier today for seizure. Treated with Ativan and Keppra. Required intubation after remaining in status epilepticus. Patient transferred to [facility] for vEEG and further care.\"  Following transfer to [place], patient was originally admitted to the neuro ICU for close ongoing monitoring given status epilepticus.  His home AEDs, including Keppra and zonisamide, were resumed.  He was monitored on video EEG which did not capture any seizure events, though the patient did self remove his leads.  During his time in the ICU, he was noted to have developed an BALWINDER with peak creatinine of 2.88; following fluid resuscitation, this had begun to improve to 1.99 by his final day in the hospital.  He did undergo renal ultrasound which was largely unremarkable.  The patient was evaluated by Neurology and recommended to resume his home AEDs and to follow-up with his regular neurologist in [place].  Following his downgrade out of the ICU, the plan was originally for continued monitoring to ensure that the patient's renal function returns to his prehospital baseline; however, on the evening of 10/8, he stated that he would like to leave AMA.  Despite a detailed discussion of the risks of leaving prior to completion of his medical treatment, " including the potential for worsening seizures, recurrent status epilepticus, worsening renal function, critical illness, and potentially even death, the patient ultimately decided to leave the hospital AGAINST MEDICAL ADVICE.  He was advised to establish with a primary care physician in 1 to 2 weeks for ongoing care and was advised to contact his regular neurologist to schedule hospital follow-up.  He will continue with his existing AEDs.  We will place an order for repeat BMP in approximately one week to ensure that his renal function is not worsening on discharge.    Please see progress note from earlier on the day of discharge for additional subjective information as well as physical exam.    DISCHARGE INFORMATION     PCP at Discharge: none; patient advised to establish with a local provider    Admitting Provider: Joon Ventura MD  Admission Date: 10/6/2024    Discharge Provider: Hayes Beverly MD  Discharge Date: 10/9/24    Discharge Disposition: Left against medical advice or discontinued care  Discharge Condition: fair  Discharge with Lines: no    Discharge Diet: regular diet  Activity Restrictions: no driving until cleared by Neurology  Test Results Pending at Discharge: None; BMP requested in one week    Discharge Diagnoses:  Principal Problem:    Epileptic seizure, generalized (HCC)  Active Problems:    BALWINDER (acute kidney injury) (HCC)    Polysubstance abuse (HCC)  Resolved Problems:    Leukocytosis    Status epilepticus (HCC)    Urinary retention      Consulting Providers:  Neurology; Neuro Critical Care    Diagnostic & Therapeutic Procedures Performed:  US kidney and bladder    Result Date: 10/7/2024  Impression: No hydronephrosis. Floating debris in the bladder noted. Workstation performed: [id number]     X-ray chest 1 view    Result Date: 10/6/2024  Impression: No acute cardiopulmonary disease. ET tube 4 cm above the sudeep. Workstation performed: [id number]     XR chest 1 view  "portable    Result Date: 10/6/2024  Impression: No acute cardiopulmonary disease. ET tube 5 cm above the sudeep. Workstation performed: [id number]     CT spine cervical without contrast    Result Date: 10/6/2024  Impression: No acute cervical spine fracture or traumatic malalignment. Workstation performed: [id number]     CT head without contrast    Result Date: 10/6/2024  Impression: No acute intracranial abnormality. Workstation performed: [id number]       Code Status: Prior  Advance Directive & Living Will: <no information>  Power of :    POLST:      Medications:  Discharge Medication List as of 10/8/2024  9:03 PM        Discharge Medication List as of 10/8/2024  9:03 PM        Discharge Medication List as of 10/8/2024  9:03 PM        CONTINUE these medications which have NOT CHANGED    Details   levETIRAcetam (KEPPRA) 500 mg tablet Take 3 tablets (1,500 mg total) by mouth every 12 (twelve) hours, Starting Mon 4/29/2024, No Print      zonisamide (ZONEGRAN) 100 mg capsule Take 3 capsules (300 mg total) by mouth daily, Starting Wed 2/14/2024, Until Mon 4/29/2024, Normal             Allergies:  No Known Allergies    FOLLOW-UP     PCP Outpatient Follow-up:  Yes, recommend establishing with a local PCP within 1 to 2 weeks from discharge for ongoing care    Consulting Providers Follow-up:  Yes, advised to follow-up with patient's regular neurologist in [place] as soon as possible for ongoing management of seizures    Active Issues Requiring Follow-up:   Seizure disorder complicated by status epilepticus, BALWINDER, substance use    Discharge Statement:   I spent 30 minutes minutes discharging the patient. This time was spent on the day of discharge. I had direct contact with the patient on the day of discharge. Additional documentation is required if more than 30 minutes were spent on discharge.    Portions of the record may have been created with voice recognition software.  Occasional wrong word or \"sound a like\" " substitutions may have occurred due to the inherent limitations of voice recognition software.  Read the chart carefully and recognize, using context, where substitutions have occurred.    ==  Yung Griffiths MD  [facility]  Internal Medicine Resident PGY-2

## 2024-10-11 LAB
BACTERIA BLD CULT: NORMAL
BACTERIA BLD CULT: NORMAL

## 2024-10-16 ENCOUNTER — APPOINTMENT (EMERGENCY)
Dept: RADIOLOGY | Facility: HOSPITAL | Age: 24
DRG: 053 | End: 2024-10-16
Payer: COMMERCIAL

## 2024-10-16 ENCOUNTER — HOSPITAL ENCOUNTER (EMERGENCY)
Facility: HOSPITAL | Age: 24
Discharge: HOME | DRG: 053 | End: 2024-10-16
Attending: EMERGENCY MEDICINE
Payer: COMMERCIAL

## 2024-10-16 ENCOUNTER — HOSPITAL ENCOUNTER (INPATIENT)
Facility: HOSPITAL | Age: 24
LOS: 2 days | Discharge: HOME | DRG: 053 | End: 2024-10-18
Attending: EMERGENCY MEDICINE | Admitting: ANESTHESIOLOGY
Payer: COMMERCIAL

## 2024-10-16 VITALS
SYSTOLIC BLOOD PRESSURE: 157 MMHG | BODY MASS INDEX: 25.06 KG/M2 | WEIGHT: 179.68 LBS | TEMPERATURE: 98.8 F | HEART RATE: 103 BPM | OXYGEN SATURATION: 97 % | RESPIRATION RATE: 20 BRPM | DIASTOLIC BLOOD PRESSURE: 93 MMHG

## 2024-10-16 VITALS
RESPIRATION RATE: 16 BRPM | HEART RATE: 78 BPM | SYSTOLIC BLOOD PRESSURE: 142 MMHG | OXYGEN SATURATION: 98 % | DIASTOLIC BLOOD PRESSURE: 70 MMHG | WEIGHT: 170 LBS | BODY MASS INDEX: 23.71 KG/M2 | TEMPERATURE: 98.4 F

## 2024-10-16 DIAGNOSIS — F15.10 METHAMPHETAMINE ABUSE (HCC): ICD-10-CM

## 2024-10-16 DIAGNOSIS — F19.10 SUBSTANCE ABUSE (HCC): ICD-10-CM

## 2024-10-16 DIAGNOSIS — G40.919 BREAKTHROUGH SEIZURE (HCC): ICD-10-CM

## 2024-10-16 DIAGNOSIS — F12.10 CANNABIS ABUSE: ICD-10-CM

## 2024-10-16 DIAGNOSIS — E72.20 HYPERAMMONEMIA (HCC): ICD-10-CM

## 2024-10-16 DIAGNOSIS — R56.9 SEIZURE (HCC): Primary | ICD-10-CM

## 2024-10-16 DIAGNOSIS — G40.919 BREAKTHROUGH SEIZURE (HCC): Primary | ICD-10-CM

## 2024-10-16 DIAGNOSIS — Z91.199 HISTORY OF NONCOMPLIANCE WITH MEDICAL TREATMENT: ICD-10-CM

## 2024-10-16 DIAGNOSIS — F19.10 POLYSUBSTANCE ABUSE (HCC): ICD-10-CM

## 2024-10-16 DIAGNOSIS — T50.905A MEDICATION REACTION, INITIAL ENCOUNTER: ICD-10-CM

## 2024-10-16 LAB
ALBUMIN SERPL BCG-MCNC: 5 G/DL (ref 3.5–5)
ALP SERPL-CCNC: 69 U/L (ref 34–104)
ALT SERPL W P-5'-P-CCNC: 12 U/L (ref 7–52)
AMMONIA PLAS-SCNC: 178 UMOL/L (ref 18–72)
AMMONIA PLAS-SCNC: 179 UMOL/L (ref 18–72)
AMPHETAMINES SERPL QL SCN: POSITIVE
ANION GAP SERPL CALCULATED.3IONS-SCNC: 27 MMOL/L (ref 4–13)
ANION GAP SERPL CALCULATED.3IONS-SCNC: 30 MMOL/L (ref 4–13)
AST SERPL W P-5'-P-CCNC: 14 U/L (ref 13–39)
BACTERIA UR QL AUTO: NORMAL /HPF
BARBITURATES UR QL: NEGATIVE
BASOPHILS # BLD AUTO: 0.06 THOUSANDS/ΜL (ref 0–0.1)
BASOPHILS # BLD AUTO: 0.07 THOUSANDS/ΜL (ref 0–0.1)
BASOPHILS NFR BLD AUTO: 0 % (ref 0–1)
BASOPHILS NFR BLD AUTO: 0 % (ref 0–1)
BENZODIAZ UR QL: NEGATIVE
BILIRUB SERPL-MCNC: 0.34 MG/DL (ref 0.2–1)
BILIRUB UR QL STRIP: NEGATIVE
BUN SERPL-MCNC: 12 MG/DL (ref 5–25)
BUN SERPL-MCNC: 13 MG/DL (ref 5–25)
CALCIUM SERPL-MCNC: 10 MG/DL (ref 8.4–10.2)
CALCIUM SERPL-MCNC: 9.3 MG/DL (ref 8.4–10.2)
CHLORIDE SERPL-SCNC: 102 MMOL/L (ref 96–108)
CHLORIDE SERPL-SCNC: 102 MMOL/L (ref 96–108)
CK SERPL-CCNC: 76 U/L (ref 39–308)
CLARITY UR: CLEAR
CO2 SERPL-SCNC: 11 MMOL/L (ref 21–32)
CO2 SERPL-SCNC: 9 MMOL/L (ref 21–32)
COCAINE UR QL: NEGATIVE
COLOR UR: YELLOW
CREAT SERPL-MCNC: 1.03 MG/DL (ref 0.6–1.3)
CREAT SERPL-MCNC: 1.06 MG/DL (ref 0.6–1.3)
EOSINOPHIL # BLD AUTO: 0.18 THOUSAND/ΜL (ref 0–0.61)
EOSINOPHIL # BLD AUTO: 0.19 THOUSAND/ΜL (ref 0–0.61)
EOSINOPHIL NFR BLD AUTO: 1 % (ref 0–6)
EOSINOPHIL NFR BLD AUTO: 1 % (ref 0–6)
ERYTHROCYTE [DISTWIDTH] IN BLOOD BY AUTOMATED COUNT: 11.8 % (ref 11.6–15.1)
ERYTHROCYTE [DISTWIDTH] IN BLOOD BY AUTOMATED COUNT: 11.9 % (ref 11.6–15.1)
FENTANYL UR QL SCN: NEGATIVE
GFR SERPL CREATININE-BSD FRML MDRD: 102 ML/MIN/1.73SQ M
GFR SERPL CREATININE-BSD FRML MDRD: 99 ML/MIN/1.73SQ M
GLUCOSE SERPL-MCNC: 101 MG/DL (ref 65–140)
GLUCOSE SERPL-MCNC: 106 MG/DL (ref 65–140)
GLUCOSE SERPL-MCNC: 123 MG/DL (ref 65–140)
GLUCOSE SERPL-MCNC: 126 MG/DL (ref 65–140)
GLUCOSE UR STRIP-MCNC: NEGATIVE MG/DL
HCT VFR BLD AUTO: 47.6 % (ref 36.5–49.3)
HCT VFR BLD AUTO: 48.4 % (ref 36.5–49.3)
HGB BLD-MCNC: 14.2 G/DL (ref 12–17)
HGB BLD-MCNC: 15.1 G/DL (ref 12–17)
HGB UR QL STRIP.AUTO: NEGATIVE
HYDROCODONE UR QL SCN: NEGATIVE
IMM GRANULOCYTES # BLD AUTO: 0.06 THOUSAND/UL (ref 0–0.2)
IMM GRANULOCYTES # BLD AUTO: 0.27 THOUSAND/UL (ref 0–0.2)
IMM GRANULOCYTES NFR BLD AUTO: 0 % (ref 0–2)
IMM GRANULOCYTES NFR BLD AUTO: 1 % (ref 0–2)
KETONES UR STRIP-MCNC: NEGATIVE MG/DL
LACTATE SERPL-SCNC: 17.5 MMOL/L (ref 0.5–2)
LEUKOCYTE ESTERASE UR QL STRIP: NEGATIVE
LEVETIRACETAM SERPL-MCNC: <2 UG/ML (ref 12–46)
LYMPHOCYTES # BLD AUTO: 3.22 THOUSANDS/ΜL (ref 0.6–4.47)
LYMPHOCYTES # BLD AUTO: 3.42 THOUSANDS/ΜL (ref 0.6–4.47)
LYMPHOCYTES NFR BLD AUTO: 13 % (ref 14–44)
LYMPHOCYTES NFR BLD AUTO: 22 % (ref 14–44)
MCH RBC QN AUTO: 30.3 PG (ref 26.8–34.3)
MCH RBC QN AUTO: 30.3 PG (ref 26.8–34.3)
MCHC RBC AUTO-ENTMCNC: 29.8 G/DL (ref 31.4–37.4)
MCHC RBC AUTO-ENTMCNC: 31.2 G/DL (ref 31.4–37.4)
MCV RBC AUTO: 102 FL (ref 82–98)
MCV RBC AUTO: 97 FL (ref 82–98)
METHADONE UR QL: NEGATIVE
MONOCYTES # BLD AUTO: 1.86 THOUSAND/ΜL (ref 0.17–1.22)
MONOCYTES # BLD AUTO: 3.51 THOUSAND/ΜL (ref 0.17–1.22)
MONOCYTES NFR BLD AUTO: 12 % (ref 4–12)
MONOCYTES NFR BLD AUTO: 14 % (ref 4–12)
NEUTROPHILS # BLD AUTO: 10.21 THOUSANDS/ΜL (ref 1.85–7.62)
NEUTROPHILS # BLD AUTO: 17.72 THOUSANDS/ΜL (ref 1.85–7.62)
NEUTS SEG NFR BLD AUTO: 65 % (ref 43–75)
NEUTS SEG NFR BLD AUTO: 71 % (ref 43–75)
NITRITE UR QL STRIP: NEGATIVE
NON-SQ EPI CELLS URNS QL MICRO: NORMAL /HPF
NRBC BLD AUTO-RTO: 0 /100 WBCS
NRBC BLD AUTO-RTO: 0 /100 WBCS
OPIATES UR QL SCN: NEGATIVE
OXYCODONE+OXYMORPHONE UR QL SCN: NEGATIVE
PCP UR QL: NEGATIVE
PH UR STRIP.AUTO: 6 [PH]
PLATELET # BLD AUTO: 302 THOUSANDS/UL (ref 149–390)
PLATELET # BLD AUTO: 315 THOUSANDS/UL (ref 149–390)
PMV BLD AUTO: 11.1 FL (ref 8.9–12.7)
PMV BLD AUTO: 11.1 FL (ref 8.9–12.7)
POTASSIUM SERPL-SCNC: 3.2 MMOL/L (ref 3.5–5.3)
POTASSIUM SERPL-SCNC: 3.4 MMOL/L (ref 3.5–5.3)
PROT SERPL-MCNC: 7.6 G/DL (ref 6.4–8.4)
PROT UR STRIP-MCNC: ABNORMAL MG/DL
RBC # BLD AUTO: 4.69 MILLION/UL (ref 3.88–5.62)
RBC # BLD AUTO: 4.99 MILLION/UL (ref 3.88–5.62)
RBC #/AREA URNS AUTO: NORMAL /HPF
SODIUM SERPL-SCNC: 140 MMOL/L (ref 135–147)
SODIUM SERPL-SCNC: 141 MMOL/L (ref 135–147)
SP GR UR STRIP.AUTO: 1.02 (ref 1–1.03)
THC UR QL: POSITIVE
UROBILINOGEN UR QL STRIP.AUTO: 0.2 E.U./DL
WBC # BLD AUTO: 15.8 THOUSAND/UL (ref 4.31–10.16)
WBC # BLD AUTO: 24.97 THOUSAND/UL (ref 4.31–10.16)
WBC #/AREA URNS AUTO: NORMAL /HPF

## 2024-10-16 PROCEDURE — 85025 COMPLETE CBC W/AUTO DIFF WBC: CPT | Performed by: EMERGENCY MEDICINE

## 2024-10-16 PROCEDURE — 83520 IMMUNOASSAY QUANT NOS NONAB: CPT | Performed by: EMERGENCY MEDICINE

## 2024-10-16 PROCEDURE — 99285 EMERGENCY DEPT VISIT HI MDM: CPT | Performed by: EMERGENCY MEDICINE

## 2024-10-16 PROCEDURE — 83605 ASSAY OF LACTIC ACID: CPT | Performed by: EMERGENCY MEDICINE

## 2024-10-16 PROCEDURE — 93005 ELECTROCARDIOGRAM TRACING: CPT

## 2024-10-16 PROCEDURE — 80203 DRUG SCREEN QUANT ZONISAMIDE: CPT

## 2024-10-16 PROCEDURE — 83605 ASSAY OF LACTIC ACID: CPT

## 2024-10-16 PROCEDURE — 80053 COMPREHEN METABOLIC PANEL: CPT | Performed by: EMERGENCY MEDICINE

## 2024-10-16 PROCEDURE — 99284 EMERGENCY DEPT VISIT MOD MDM: CPT | Performed by: EMERGENCY MEDICINE

## 2024-10-16 PROCEDURE — 96361 HYDRATE IV INFUSION ADD-ON: CPT

## 2024-10-16 PROCEDURE — 82948 REAGENT STRIP/BLOOD GLUCOSE: CPT

## 2024-10-16 PROCEDURE — 99284 EMERGENCY DEPT VISIT MOD MDM: CPT

## 2024-10-16 PROCEDURE — 81001 URINALYSIS AUTO W/SCOPE: CPT | Performed by: EMERGENCY MEDICINE

## 2024-10-16 PROCEDURE — 82140 ASSAY OF AMMONIA: CPT | Performed by: EMERGENCY MEDICINE

## 2024-10-16 PROCEDURE — 96360 HYDRATION IV INFUSION INIT: CPT

## 2024-10-16 PROCEDURE — 80048 BASIC METABOLIC PNL TOTAL CA: CPT | Performed by: EMERGENCY MEDICINE

## 2024-10-16 PROCEDURE — 84145 PROCALCITONIN (PCT): CPT

## 2024-10-16 PROCEDURE — 36415 COLL VENOUS BLD VENIPUNCTURE: CPT | Performed by: EMERGENCY MEDICINE

## 2024-10-16 PROCEDURE — 83735 ASSAY OF MAGNESIUM: CPT

## 2024-10-16 PROCEDURE — 80307 DRUG TEST PRSMV CHEM ANLYZR: CPT | Performed by: EMERGENCY MEDICINE

## 2024-10-16 PROCEDURE — 84100 ASSAY OF PHOSPHORUS: CPT

## 2024-10-16 PROCEDURE — 71045 X-RAY EXAM CHEST 1 VIEW: CPT

## 2024-10-16 PROCEDURE — 82550 ASSAY OF CK (CPK): CPT | Performed by: EMERGENCY MEDICINE

## 2024-10-16 RX ORDER — LEVETIRACETAM 500 MG/5ML
1000 INJECTION, SOLUTION, CONCENTRATE INTRAVENOUS ONCE
Status: COMPLETED | OUTPATIENT
Start: 2024-10-16 | End: 2024-10-16

## 2024-10-16 RX ORDER — LEVETIRACETAM 500 MG/5ML
500 INJECTION, SOLUTION, CONCENTRATE INTRAVENOUS EVERY 12 HOURS SCHEDULED
Status: DISCONTINUED | OUTPATIENT
Start: 2024-10-17 | End: 2024-10-18 | Stop reason: HOSPADM

## 2024-10-16 RX ORDER — POTASSIUM CHLORIDE 14.9 MG/ML
20 INJECTION INTRAVENOUS ONCE
Status: COMPLETED | OUTPATIENT
Start: 2024-10-17 | End: 2024-10-17

## 2024-10-16 RX ORDER — SODIUM CHLORIDE, SODIUM GLUCONATE, SODIUM ACETATE, POTASSIUM CHLORIDE, MAGNESIUM CHLORIDE, SODIUM PHOSPHATE, DIBASIC, AND POTASSIUM PHOSPHATE .53; .5; .37; .037; .03; .012; .00082 G/100ML; G/100ML; G/100ML; G/100ML; G/100ML; G/100ML; G/100ML
2000 INJECTION, SOLUTION INTRAVENOUS ONCE
Status: COMPLETED | OUTPATIENT
Start: 2024-10-16 | End: 2024-10-17

## 2024-10-16 RX ORDER — CHLORHEXIDINE GLUCONATE ORAL RINSE 1.2 MG/ML
15 SOLUTION DENTAL EVERY 12 HOURS SCHEDULED
Status: DISCONTINUED | OUTPATIENT
Start: 2024-10-17 | End: 2024-10-18 | Stop reason: HOSPADM

## 2024-10-16 RX ORDER — LORAZEPAM 2 MG/ML
2 INJECTION INTRAMUSCULAR ONCE
Status: COMPLETED | OUTPATIENT
Start: 2024-10-16 | End: 2024-10-16

## 2024-10-16 RX ORDER — HEPARIN SODIUM 5000 [USP'U]/ML
5000 INJECTION, SOLUTION INTRAVENOUS; SUBCUTANEOUS EVERY 8 HOURS SCHEDULED
Status: DISCONTINUED | OUTPATIENT
Start: 2024-10-17 | End: 2024-10-18 | Stop reason: HOSPADM

## 2024-10-16 RX ADMIN — SODIUM CHLORIDE, SODIUM GLUCONATE, SODIUM ACETATE, POTASSIUM CHLORIDE, MAGNESIUM CHLORIDE, SODIUM PHOSPHATE, DIBASIC, AND POTASSIUM PHOSPHATE 2000 ML: .53; .5; .37; .037; .03; .012; .00082 INJECTION, SOLUTION INTRAVENOUS at 22:41

## 2024-10-16 RX ADMIN — SODIUM CHLORIDE 1000 ML: 0.9 INJECTION, SOLUTION INTRAVENOUS at 16:28

## 2024-10-16 RX ADMIN — LEVETIRACETAM 1000 MG: 100 INJECTION, SOLUTION INTRAVENOUS at 22:22

## 2024-10-16 RX ADMIN — LORAZEPAM 2 MG: 2 INJECTION INTRAMUSCULAR; INTRAVENOUS at 22:05

## 2024-10-16 NOTE — ED NOTES
"This Nurse approached the pt to obtain follow up blood work. Pt was visibly agitated, shaking on the bed and hitting hands together, talking forcefully. At this time pt is stating \"I'm going home, I've been waiting to go home, you people keep coming in here for blood work and I've been waiting for hours to leave.\" This Nurse attempted to provide comfort measures and pt was too agitated at this time.     Lauren Emmanuel RN  10/16/24 [id number]    "

## 2024-10-16 NOTE — ED NOTES
Patient heard speaking with mother in room, entered room to attempt to do neuro assessment and finish cleaning patient. Patient alert and oriented x4. Patient now agreeable to changing out of soiled pants. Patient asked to stand at bedside while linens were changed. Patient able to stand at bedside. Patient then helped into gown. While helping put patient in gown, patient pulling repeatedly at pants. Asked patient several times if they needed assistance. Patient refusing and starting to pace in room. Patient had EKG wires tangled in draw strings of both pairs of pants. This RN reached to assist and felt something in patients right pocket, patient became irate stating they no longer wanted to change and was requesting to leave. Provider and security alerted.      Tami Murdock RN  10/16/24 [id number]

## 2024-10-16 NOTE — ED NOTES
Mother left to get him clean clothes and states she will be back. She states it ok to leave her a message on her cell phone if we kimberli to get a hold of her.      Jose Antonio Leblanc RN  10/16/24 [id number]

## 2024-10-16 NOTE — ED NOTES
Patient ambulated to bathroom with out difficulty. Patient providing urine sample at this time.      Gali Tolentino RN  10/16/24 [id number]

## 2024-10-16 NOTE — ED PROVIDER NOTES
Time reflects when diagnosis was documented in both MDM as applicable and the Disposition within this note       Time User Action Codes Description Comment    10/16/2024  7:26 PM Trenton White [G40.919] Breakthrough seizure (HCC)     10/16/2024  7:26 PM Trenton White Add [F19.10] Polysubstance abuse (HCC)     10/16/2024  7:26 PM Trenton White Add [F15.10] Methamphetamine abuse (HCC)     10/16/2024  7:27 PM Trenton White Add [F12.10] Cannabis abuse     10/16/2024  7:27 PM Trenton White [E72.20] Hyperammonemia (HCC)     10/16/2024  7:27 PM Trenton Whiet Remove [F19.10] Polysubstance abuse (HCC)           ED Disposition       ED Disposition   Discharge    Condition   Stable    Date/Time   Wed Oct 16, 2024  7:26 PM    Comment   Tim Alanis Jr. discharge to home/self care.                   Assessment & Plan       Medical Decision Making  Patient was seen and evaluated.  Presents in an apparent postictal state on arrival.  He progressively improved throughout his stay.  However, he seemed to be uncooperative at times with nursing interventions.  Became very irritated at length of stay, asking to leave.  Blood work obtained as noted above.  Actually shows improved renal function from recent hospitalization as well as normal liver function.  Elevated white blood cell count and lactic acid attributed to seizure activity.  Found to have an elevated ammonia level.  Ammonia level was obtained due to mother's report of altered mental status at times since recent hospital discharge.  Given patient's elevated ammonia despite normal renal function, suspect this may be related to zonisamide, as hyperammonemia and hyperammonemic encephalopathy are noted adverse effects.  Patient was offered hospital admission for further evaluation and workup.  However, he is adamant that he would like to leave the hospital and follow-up as outpatient.  He was advised to follow-up closely with his neurologist for further advice  regarding his antiepileptic medications.  He was also advised to seek assistance with his polysubstance abuse, specifically methamphetamine and marijuana.  Patient is estimated to be stable for discharge from the emergency department.  Strict return precautions reviewed.  All questions answered.    Amount and/or Complexity of Data Reviewed  Labs: ordered.  ECG/medicine tests: ordered and independent interpretation performed.     Details: EKG by my interpretation demonstrates sinus tachycardia at a ventricular rate of 118 bpm.  Right right axis, normal intervals.  No acute ST elevations or T wave inversions.  Compared with prior EKG from October 6, 2024, ventricular rate has increased by 26 bpm, axis is deviated rightward.             Medications   sodium chloride 0.9 % bolus 1,000 mL (0 mL Intravenous Stopped 10/16/24 1800)       ED Risk Strat Scores                           SBIRT 22yo+      Flowsheet Row Most Recent Value   Initial Alcohol Screen: US AUDIT-C     1. How often do you have a drink containing alcohol? 0 Filed at: 10/16/2024 1615   2. How many drinks containing alcohol do you have on a typical day you are drinking?  0 Filed at: 10/16/2024 1615   3a. Male UNDER 65: How often do you have five or more drinks on one occasion? 0 Filed at: 10/16/2024 1615   Audit-C Score 0 Filed at: 10/16/2024 1615   DANIELE: How many times in the past year have you...    Used an illegal drug or used a prescription medication for non-medical reasons? Never Filed at: 10/16/2024 1615                            History of Present Illness       Chief Complaint   Patient presents with    Seizure Re-Evaluation     Patient was at [facility] getting picture taking and started convulsing. Patient postictal at this time.        Past Medical History:   Diagnosis Date    Seizure (HCC)       History reviewed. No pertinent surgical history.   History reviewed. No pertinent family history.   Social History     Tobacco Use    Smoking status: Former      Current packs/day: 1.00     Average packs/day: 1 pack/day for 5.0 years (5.0 ttl pk-yrs)     Types: Cigarettes    Smokeless tobacco: Never   Vaping Use    Vaping status: Never Used   Substance Use Topics    Alcohol use: Yes     Comment: sociall    Drug use: Not Currently     Types: Marijuana     Comment: per mother pt has history of meth amphetamine abuse      E-Cigarette/Vaping    E-Cigarette Use Never User       E-Cigarette/Vaping Substances      I have reviewed and agree with the history as documented.     Patient presents to the emergency department acutely altered.  Brought in by mother.  History obtained from mother.  Patient was apparently here recently for recurrent seizures, transferred to [facility].  Evaluation they are unclear.  He was discharged, continued on prior medications.  Per triage report, supposed to be on Zomig and Keppra.  Mother states that since his discharge from [facility], he began not acting right, seeming confused at times, not always recognizing people, actually expressing some paranoia towards others.  Mother states that she tries to make sure he is taking his medications properly, but she is not sure if he is remembering them consistently.  Today, she states that they were driving in the car, going to various administrative appointments, when patient began having a seizure lasting a few minutes.  She drove straight here.  Patient was postictal on arrival, unresponsive initially, then awoke but seemed confused, consistent with postictal state.  Additional history review of systems limited due to patient's mental status.        Review of Systems   Unable to perform ROS: Mental status change           Objective       ED Triage Vitals [10/16/24 1609]   Temperature Pulse Blood Pressure Respirations SpO2 Patient Position - Orthostatic VS   98.4 °F (36.9 °C) (!) 125 118/66 16 96 % Lying      Temp Source Heart Rate Source BP Location FiO2 (%) Pain Score    Temporal  Monitor Left arm -- --      Vitals      Date and Time Temp Pulse SpO2 Resp BP Pain Score FACES Pain Rating User   10/16/24 1715 -- 93 97 % 18 149/97 -- -- SS   10/16/24 1609 98.4 °F (36.9 °C) 125 96 % 16 118/66 -- 0 BF            Physical Exam  Vitals and nursing note reviewed.   Constitutional:       Appearance: He is well-developed. He is ill-appearing and diaphoretic.      Comments: Initially unresponsive, then eyes opening spontaneously, muttering response to questions, responds to voice and name, does not consistently follow commands.   HENT:      Head: Normocephalic and atraumatic.      Right Ear: External ear normal.      Left Ear: External ear normal.      Nose: Nose normal.   Eyes:      Extraocular Movements: Extraocular movements intact.      Conjunctiva/sclera: Conjunctivae normal.      Pupils: Pupils are equal, round, and reactive to light.   Cardiovascular:      Rate and Rhythm: Regular rhythm. Tachycardia present.      Pulses: Normal pulses.      Heart sounds: Normal heart sounds. No murmur heard.     No friction rub. No gallop.   Pulmonary:      Effort: Pulmonary effort is normal. No respiratory distress.      Breath sounds: Normal breath sounds. No wheezing or rales.   Abdominal:      General: Abdomen is flat. There is no distension.      Palpations: Abdomen is soft.      Tenderness: There is no abdominal tenderness. There is no guarding or rebound.   Musculoskeletal:         General: No swelling or signs of injury. Normal range of motion.      Cervical back: Normal range of motion and neck supple.   Skin:     General: Skin is warm.      Capillary Refill: Capillary refill takes less than 2 seconds.      Findings: No erythema or rash.   Neurological:      General: No focal deficit present.      Mental Status: He is disoriented.      GCS: GCS eye subscore is 4. GCS verbal subscore is 3. GCS motor subscore is 5.   Psychiatric:      Comments: Unable to fully assess.         Results Reviewed        Procedure Component Value Units Date/Time    Rapid drug screen, urine [[id number]]  (Abnormal) Collected: 10/16/24 1813    Lab Status: Final result Specimen: Urine, Clean Catch Updated: 10/16/24 1848     Amph/Meth UR Positive     Barbiturate Ur Negative     Benzodiazepine Urine Negative     Cocaine Urine Negative     Methadone Urine Negative     Opiate Urine Negative     PCP Ur Negative     THC Urine Positive     Oxycodone Urine Negative     Fentanyl Urine Negative     HYDROCODONE URINE Negative    Narrative:      Presumptive report. If requested, specimen will be sent to reference lab for confirmation.  FOR MEDICAL PURPOSES ONLY.   IF CONFIRMATION NEEDED PLEASE CONTACT THE LAB WITHIN 5 DAYS.    Drug Screen Cutoff Levels:  AMPHETAMINE/METHAMPHETAMINES  1000 ng/mL  BARBITURATES     200 ng/mL  BENZODIAZEPINES     200 ng/mL  COCAINE      300 ng/mL  METHADONE      300 ng/mL  OPIATES      300 ng/mL  PHENCYCLIDINE     25 ng/mL  THC       50 ng/mL  OXYCODONE      100 ng/mL  FENTANYL      5 ng/mL  HYDROCODONE     300 ng/mL    Urine Microscopic [[id number]]  (Normal) Collected: 10/16/24 1813    Lab Status: Final result Specimen: Urine, Clean Catch Updated: 10/16/24 1823     RBC, UA None Seen /hpf      WBC, UA None Seen /hpf      Epithelial Cells Occasional /hpf      Bacteria, UA None Seen /hpf     UA (URINE) with reflex to Scope [[id number]]  (Abnormal) Collected: 10/16/24 1813    Lab Status: Final result Specimen: Urine, Clean Catch Updated: 10/16/24 1820     Color, UA Yellow     Clarity, UA Clear     Specific Gravity, UA 1.025     pH, UA 6.0     Leukocytes, UA Negative     Nitrite, UA Negative     Protein, UA 30 (1+) mg/dl      Glucose, UA Negative mg/dl      Ketones, UA Negative mg/dl      Urobilinogen, UA 0.2 E.U./dl      Bilirubin, UA Negative     Occult Blood, UA Negative    Ammonia [[id number]]  (Abnormal) Collected: 10/16/24 1625    Lab Status: Final result Specimen: Blood from Arm, Right Updated: 10/16/24 2514      Ammonia 178 umol/L     Lactic acid, plasma (w/reflex if result > 2.0) [[id number]]  (Abnormal) Collected: 10/16/24 1625    Lab Status: Final result Specimen: Blood from Arm, Right Updated: 10/16/24 1709     LACTIC ACID 17.5 mmol/L     Narrative:      Result may be elevated if tourniquet was used during collection.    Lactic acid 2 Hours [[id number]]     Lab Status: No result Specimen: Blood     Comprehensive metabolic panel [[id number]]  (Abnormal) Collected: 10/16/24 1625    Lab Status: Final result Specimen: Blood from Arm, Right Updated: 10/16/24 1655     Sodium 140 mmol/L      Potassium 3.4 mmol/L      Chloride 102 mmol/L      CO2 11 mmol/L      ANION GAP 27 mmol/L      BUN 13 mg/dL      Creatinine 1.06 mg/dL      Glucose 101 mg/dL      Calcium 10.0 mg/dL      AST 14 U/L      ALT 12 U/L      Alkaline Phosphatase 69 U/L      Total Protein 7.6 g/dL      Albumin 5.0 g/dL      Total Bilirubin 0.34 mg/dL      eGFR 99 ml/min/1.73sq m     Narrative:      [employer] guidelines for Chronic Kidney Disease (CKD):     Stage 1 with normal or high GFR (GFR > 90 mL/min/1.73 square meters)    Stage 2 Mild CKD (GFR = 60-89 mL/min/1.73 square meters)    Stage 3A Moderate CKD (GFR = 45-59 mL/min/1.73 square meters)    Stage 3B Moderate CKD (GFR = 30-44 mL/min/1.73 square meters)    Stage 4 Severe CKD (GFR = 15-29 mL/min/1.73 square meters)    Stage 5 End Stage CKD (GFR <15 mL/min/1.73 square meters)  Note: GFR calculation is accurate only with a steady state creatinine    CK [[id number]]  (Normal) Collected: 10/16/24 1625    Lab Status: Final result Specimen: Blood from Arm, Right Updated: 10/16/24 1655     Total CK 76 U/L     CBC and differential [[id number]]  (Abnormal) Collected: 10/16/24 1625    Lab Status: Final result Specimen: Blood from Arm, Right Updated: 10/16/24 1633     WBC 15.80 Thousand/uL      RBC 4.99 Million/uL      Hemoglobin 15.1 g/dL      Hematocrit 48.4 %      MCV 97 fL      MCH 30.3 pg       MCHC 31.2 g/dL      RDW 11.8 %      MPV 11.1 fL      Platelets 302 Thousands/uL      nRBC 0 /100 WBCs      Segmented % 65 %      Immature Grans % 0 %      Lymphocytes % 22 %      Monocytes % 12 %      Eosinophils Relative 1 %      Basophils Relative 0 %      Absolute Neutrophils 10.21 Thousands/µL      Absolute Immature Grans 0.06 Thousand/uL      Absolute Lymphocytes 3.42 Thousands/µL      Absolute Monocytes 1.86 Thousand/µL      Eosinophils Absolute 0.18 Thousand/µL      Basophils Absolute 0.07 Thousands/µL     Levetiracetam level [[id number]] Collected: 10/16/24 1625    Lab Status: In process Specimen: Blood from Arm, Right Updated: 10/16/24 1630    Fingerstick Glucose (POCT) [[id number]]  (Normal) Collected: 10/16/24 1612    Lab Status: Final result Specimen: Blood Updated: 10/16/24 1613     POC Glucose 106 mg/dl             No orders to display       Procedures    ED Medication and Procedure Management   Prior to Admission Medications   Prescriptions Last Dose Informant Patient Reported? Taking?   levETIRAcetam (KEPPRA) 500 mg tablet   No No   Sig: Take 3 tablets (1,500 mg total) by mouth every 12 (twelve) hours   zonisamide (ZONEGRAN) 100 mg capsule   No No   Sig: Take 3 capsules (300 mg total) by mouth daily      Facility-Administered Medications: None     Patient's Medications   Discharge Prescriptions    No medications on file     No discharge procedures on file.  ED SEPSIS DOCUMENTATION   Time reflects when diagnosis was documented in both MDM as applicable and the Disposition within this note       Time User Action Codes Description Comment    10/16/2024  7:26 PM Trenton White [G40.919] Breakthrough seizure (HCC)     10/16/2024  7:26 PM Trenton White [F19.10] Polysubstance abuse (HCC)     10/16/2024  7:26 PM Trenton White [F15.10] Methamphetamine abuse (HCC)     10/16/2024  7:27 PM Trenton White [F12.10] Cannabis abuse     10/16/2024  7:27 PM Trenton White [E72.20] Hyperammonemia  ([facility])     10/16/2024  7:27 PM Trenton White [F19.10] Polysubstance abuse ([facility])                  Trenton White MD  10/16/24 1935

## 2024-10-16 NOTE — ED NOTES
Pt's mother Shannon called to pick patient up for discharge, stated she will be on her way      Valarie Albarran RN  10/16/24 1945

## 2024-10-16 NOTE — DISCHARGE INSTRUCTIONS
Follow-up closely with neurology regarding instructions for ongoing dosing of your seizure medications.  Your ammonia level was found to be elevated today, which could be caused by your zonisamide.  Recommend discussing this with your neurologist to determine whether zonisamide should be continued.  Strongly recommend consideration of cessation of substance abuse.  Follow-up closely with primary care.  Return to the ED for any new or worsening symptoms or concerns.

## 2024-10-17 ENCOUNTER — APPOINTMENT (INPATIENT)
Dept: CT IMAGING | Facility: HOSPITAL | Age: 24
DRG: 053 | End: 2024-10-17
Payer: COMMERCIAL

## 2024-10-17 PROBLEM — R65.10 SIRS (SYSTEMIC INFLAMMATORY RESPONSE SYNDROME) (HCC): Status: ACTIVE | Noted: 2023-09-18

## 2024-10-17 PROBLEM — E72.20 HYPERAMMONEMIA (HCC): Status: ACTIVE | Noted: 2024-10-17

## 2024-10-17 LAB
AMMONIA PLAS-SCNC: 39 UMOL/L (ref 18–72)
ANION GAP SERPL CALCULATED.3IONS-SCNC: 7 MMOL/L (ref 4–13)
ANION GAP SERPL CALCULATED.3IONS-SCNC: 7 MMOL/L (ref 4–13)
ATRIAL RATE: 106 BPM
ATRIAL RATE: 118 BPM
ATRIAL RATE: 91 BPM
BUN SERPL-MCNC: 8 MG/DL (ref 5–25)
BUN SERPL-MCNC: 9 MG/DL (ref 5–25)
CA-I BLD-SCNC: 1.05 MMOL/L (ref 1.12–1.32)
CALCIUM SERPL-MCNC: 8.2 MG/DL (ref 8.4–10.2)
CALCIUM SERPL-MCNC: 8.9 MG/DL (ref 8.4–10.2)
CHLORIDE SERPL-SCNC: 109 MMOL/L (ref 96–108)
CHLORIDE SERPL-SCNC: 110 MMOL/L (ref 96–108)
CO2 SERPL-SCNC: 20 MMOL/L (ref 21–32)
CO2 SERPL-SCNC: 21 MMOL/L (ref 21–32)
CREAT SERPL-MCNC: 0.66 MG/DL (ref 0.6–1.3)
CREAT SERPL-MCNC: 0.69 MG/DL (ref 0.6–1.3)
ERYTHROCYTE [DISTWIDTH] IN BLOOD BY AUTOMATED COUNT: 11.9 % (ref 11.6–15.1)
GFR SERPL CREATININE-BSD FRML MDRD: 134 ML/MIN/1.73SQ M
GFR SERPL CREATININE-BSD FRML MDRD: 137 ML/MIN/1.73SQ M
GLUCOSE SERPL-MCNC: 89 MG/DL (ref 65–140)
GLUCOSE SERPL-MCNC: 95 MG/DL (ref 65–140)
HCT VFR BLD AUTO: 37 % (ref 36.5–49.3)
HGB BLD-MCNC: 12.2 G/DL (ref 12–17)
LACTATE SERPL-SCNC: 1.3 MMOL/L (ref 0.5–2)
MAGNESIUM SERPL-MCNC: 2.1 MG/DL (ref 1.9–2.7)
MAGNESIUM SERPL-MCNC: 2.2 MG/DL (ref 1.9–2.7)
MCH RBC QN AUTO: 29.9 PG (ref 26.8–34.3)
MCHC RBC AUTO-ENTMCNC: 33 G/DL (ref 31.4–37.4)
MCV RBC AUTO: 91 FL (ref 82–98)
P AXIS: 73 DEGREES
P AXIS: 74 DEGREES
P AXIS: 82 DEGREES
PHOSPHATE SERPL-MCNC: 2.7 MG/DL (ref 2.7–4.5)
PHOSPHATE SERPL-MCNC: 3.2 MG/DL (ref 2.7–4.5)
PLATELET # BLD AUTO: 225 THOUSANDS/UL (ref 149–390)
PLATELET # BLD AUTO: 315 THOUSANDS/UL (ref 149–390)
PMV BLD AUTO: 11.1 FL (ref 8.9–12.7)
PMV BLD AUTO: 11.2 FL (ref 8.9–12.7)
POTASSIUM SERPL-SCNC: 5 MMOL/L (ref 3.5–5.3)
POTASSIUM SERPL-SCNC: 5.6 MMOL/L (ref 3.5–5.3)
PR INTERVAL: 156 MS
PR INTERVAL: 162 MS
PR INTERVAL: 182 MS
PROCALCITONIN SERPL-MCNC: 0.1 NG/ML
QRS AXIS: 84 DEGREES
QRS AXIS: 88 DEGREES
QRS AXIS: 91 DEGREES
QRSD INTERVAL: 78 MS
QRSD INTERVAL: 84 MS
QRSD INTERVAL: 94 MS
QT INTERVAL: 318 MS
QT INTERVAL: 344 MS
QT INTERVAL: 370 MS
QTC INTERVAL: 445 MS
QTC INTERVAL: 455 MS
QTC INTERVAL: 456 MS
RBC # BLD AUTO: 4.08 MILLION/UL (ref 3.88–5.62)
SODIUM SERPL-SCNC: 137 MMOL/L (ref 135–147)
SODIUM SERPL-SCNC: 137 MMOL/L (ref 135–147)
T WAVE AXIS: 59 DEGREES
T WAVE AXIS: 70 DEGREES
T WAVE AXIS: 77 DEGREES
VENTRICULAR RATE: 106 BPM
VENTRICULAR RATE: 118 BPM
VENTRICULAR RATE: 91 BPM
WBC # BLD AUTO: 14.96 THOUSAND/UL (ref 4.31–10.16)

## 2024-10-17 PROCEDURE — 85027 COMPLETE CBC AUTOMATED: CPT

## 2024-10-17 PROCEDURE — 82330 ASSAY OF CALCIUM: CPT

## 2024-10-17 PROCEDURE — 82140 ASSAY OF AMMONIA: CPT

## 2024-10-17 PROCEDURE — 36415 COLL VENOUS BLD VENIPUNCTURE: CPT

## 2024-10-17 PROCEDURE — NC001 PR NO CHARGE: Performed by: PHYSICIAN ASSISTANT

## 2024-10-17 PROCEDURE — 87040 BLOOD CULTURE FOR BACTERIA: CPT

## 2024-10-17 PROCEDURE — 70450 CT HEAD/BRAIN W/O DYE: CPT

## 2024-10-17 PROCEDURE — 80048 BASIC METABOLIC PNL TOTAL CA: CPT | Performed by: PHYSICIAN ASSISTANT

## 2024-10-17 PROCEDURE — 99255 IP/OBS CONSLTJ NEW/EST HI 80: CPT | Performed by: PSYCHIATRY & NEUROLOGY

## 2024-10-17 PROCEDURE — 83735 ASSAY OF MAGNESIUM: CPT

## 2024-10-17 PROCEDURE — 99223 1ST HOSP IP/OBS HIGH 75: CPT | Performed by: STUDENT IN AN ORGANIZED HEALTH CARE EDUCATION/TRAINING PROGRAM

## 2024-10-17 PROCEDURE — 84100 ASSAY OF PHOSPHORUS: CPT

## 2024-10-17 RX ORDER — SODIUM CHLORIDE, SODIUM GLUCONATE, SODIUM ACETATE, POTASSIUM CHLORIDE, MAGNESIUM CHLORIDE, SODIUM PHOSPHATE, DIBASIC, AND POTASSIUM PHOSPHATE .53; .5; .37; .037; .03; .012; .00082 G/100ML; G/100ML; G/100ML; G/100ML; G/100ML; G/100ML; G/100ML
1000 INJECTION, SOLUTION INTRAVENOUS ONCE
Status: COMPLETED | OUTPATIENT
Start: 2024-10-17 | End: 2024-10-17

## 2024-10-17 RX ORDER — LORAZEPAM 2 MG/ML
2 INJECTION INTRAMUSCULAR ONCE
Status: DISCONTINUED | OUTPATIENT
Start: 2024-10-17 | End: 2024-10-18 | Stop reason: HOSPADM

## 2024-10-17 RX ORDER — SODIUM CHLORIDE, SODIUM GLUCONATE, SODIUM ACETATE, POTASSIUM CHLORIDE, MAGNESIUM CHLORIDE, SODIUM PHOSPHATE, DIBASIC, AND POTASSIUM PHOSPHATE .53; .5; .37; .037; .03; .012; .00082 G/100ML; G/100ML; G/100ML; G/100ML; G/100ML; G/100ML; G/100ML
125 INJECTION, SOLUTION INTRAVENOUS CONTINUOUS
Status: DISCONTINUED | OUTPATIENT
Start: 2024-10-17 | End: 2024-10-18

## 2024-10-17 RX ORDER — ZONISAMIDE 100 MG/1
300 CAPSULE ORAL DAILY
Status: DISCONTINUED | OUTPATIENT
Start: 2024-10-17 | End: 2024-10-18 | Stop reason: HOSPADM

## 2024-10-17 RX ORDER — LORAZEPAM 2 MG/ML
2 INJECTION INTRAMUSCULAR EVERY 4 HOURS PRN
Status: DISCONTINUED | OUTPATIENT
Start: 2024-10-17 | End: 2024-10-18 | Stop reason: HOSPADM

## 2024-10-17 RX ORDER — LORAZEPAM 2 MG/ML
2 INJECTION INTRAMUSCULAR EVERY 4 HOURS PRN
Status: DISCONTINUED | OUTPATIENT
Start: 2024-10-17 | End: 2024-10-17

## 2024-10-17 RX ORDER — CALCIUM GLUCONATE 20 MG/ML
2 INJECTION, SOLUTION INTRAVENOUS ONCE
Status: COMPLETED | OUTPATIENT
Start: 2024-10-17 | End: 2024-10-17

## 2024-10-17 RX ADMIN — CHLORHEXIDINE GLUCONATE 15 ML: 1.2 RINSE ORAL at 20:45

## 2024-10-17 RX ADMIN — LEVETIRACETAM 500 MG: 100 INJECTION, SOLUTION INTRAVENOUS at 20:43

## 2024-10-17 RX ADMIN — SODIUM CHLORIDE, SODIUM GLUCONATE, SODIUM ACETATE, POTASSIUM CHLORIDE, MAGNESIUM CHLORIDE, SODIUM PHOSPHATE, DIBASIC, AND POTASSIUM PHOSPHATE 125 ML/HR: .53; .5; .37; .037; .03; .012; .00082 INJECTION, SOLUTION INTRAVENOUS at 13:36

## 2024-10-17 RX ADMIN — SODIUM CHLORIDE, SODIUM GLUCONATE, SODIUM ACETATE, POTASSIUM CHLORIDE, MAGNESIUM CHLORIDE, SODIUM PHOSPHATE, DIBASIC, AND POTASSIUM PHOSPHATE 1000 ML: .53; .5; .37; .037; .03; .012; .00082 INJECTION, SOLUTION INTRAVENOUS at 11:55

## 2024-10-17 RX ADMIN — ZONISAMIDE 300 MG: 100 CAPSULE ORAL at 15:47

## 2024-10-17 RX ADMIN — CHLORHEXIDINE GLUCONATE 15 ML: 1.2 RINSE ORAL at 09:57

## 2024-10-17 RX ADMIN — SODIUM CHLORIDE, SODIUM GLUCONATE, SODIUM ACETATE, POTASSIUM CHLORIDE, MAGNESIUM CHLORIDE, SODIUM PHOSPHATE, DIBASIC, AND POTASSIUM PHOSPHATE 125 ML/HR: .53; .5; .37; .037; .03; .012; .00082 INJECTION, SOLUTION INTRAVENOUS at 20:44

## 2024-10-17 RX ADMIN — CALCIUM GLUCONATE 2 G: 20 INJECTION, SOLUTION INTRAVENOUS at 05:57

## 2024-10-17 RX ADMIN — LORAZEPAM 2 MG: 2 INJECTION INTRAMUSCULAR; INTRAVENOUS at 05:57

## 2024-10-17 RX ADMIN — POTASSIUM CHLORIDE 20 MEQ: 14.9 INJECTION, SOLUTION INTRAVENOUS at 03:22

## 2024-10-17 RX ADMIN — LORAZEPAM 2 MG: 2 INJECTION INTRAMUSCULAR; INTRAVENOUS at 01:22

## 2024-10-17 RX ADMIN — LEVETIRACETAM 500 MG: 100 INJECTION, SOLUTION INTRAVENOUS at 09:57

## 2024-10-17 RX ADMIN — POTASSIUM CHLORIDE 20 MEQ: 14.9 INJECTION, SOLUTION INTRAVENOUS at 01:43

## 2024-10-17 RX ADMIN — POTASSIUM CHLORIDE 20 MEQ: 14.9 INJECTION, SOLUTION INTRAVENOUS at 05:37

## 2024-10-17 NOTE — UTILIZATION REVIEW
Initial Clinical Review    Admission: Date/Time/Statement:   Admission Orders (From admission, onward)       Ordered        10/16/24 2341  INPATIENT ADMISSION  Once                          Orders Placed This Encounter   Procedures    INPATIENT ADMISSION     Standing Status:   Standing     Number of Occurrences:   1     Order Specific Question:   Level of Care     Answer:   Level 1 Stepdown [13]     Order Specific Question:   Estimated length of stay     Answer:   More than 2 Midnights     Order Specific Question:   Certification     Answer:   I certify that inpatient services are medically necessary for this patient for a duration of greater than two midnights. See H&P and MD Progress Notes for additional information about the patient's course of treatment.     ED Arrival Information       Expected   -    Arrival   10/16/2024 21:50    Acuity   Emergent              Means of arrival   Walk-In    Escorted by   Family Member    Service   Critical Care/ICU    Admission type   Emergency              Arrival complaint   Seizure             Chief Complaint   Patient presents with    Medical Problem     Pt arrived to front of ED in car with his mother. Pt pulled from car by this nurse, Meghna, RN, Agustín VALLECILLO RN and Valarie RN to stretcher and taken directly back to room 5 in ED. Pt actively seizing when nurses arrived at car. Pt maintaining airway. Security also assisted.        Initial Presentation: 24 y.o. male to ED via walk in from home with family member.    Admitted to inpatient with Dx: Generalized Epileptic Seizure.  Presented to ED with Seizure-like activity.   PMHx:generalized seizures, medication noncompliance and polysubstance abuse .   Date: 10/16/2024   On exam: In acute distress.  Unresponsive..   Imaging shows CT head no acute findings.  ED treatment IV flds. IV ativan x 1.  IV keppra.    Plan includes Regular diet.  Fall precautions / Seizure precautions.  OOB to chair.  Daily weight.  I&O.  .    Anticipated  Length of Stay/Certification Statement: inpatient services are medically necessary for this patient for a duration of greater than two midnights.    Day 2: 10/17/2024  Consult Neuro.  Monitor airway.    Atvan PRN.  IV Kepra.      10/17/2024  Consult Neuro:    Recurrent seizures in the context of AED noncompliance and amphetamine/methamphetamine use.  -Recommend one-time Keppra load 3 g IV or p.o. followed by resumption of outpatient regiment 1.5 g twice daily.  - Can resume Zonegran at outpatient regiment 300 mg daily.  -No clear need for any additional neuroimaging or workup at this time.  - After a 24-hour seizure-free period, patient will be cleared for discharge from a neurologic standpoint.  - Office to call patient arrange outpatient follow-up    ED Treatment-Medication Administration from 10/16/2024 2149 to 10/17/2024 0126         Date/Time Order Dose Route Action     10/16/2024 2205 LORazepam (ATIVAN) injection 2 mg 2 mg Intravenous Given     10/16/2024 2222 levETIRAcetam (KEPPRA) injection 1,000 mg 1,000 mg Intravenous Given     10/16/2024 2241 multi-electrolyte (ISOLYTE-S PH 7.4) bolus 2,000 mL 2,000 mL Intravenous New Bag     10/17/2024 0122 LORazepam (ATIVAN) injection 2 mg 2 mg Intravenous Given            Scheduled Medications:  chlorhexidine, 15 mL, Mouth/Throat, Q12H DAYO  heparin (porcine), 5,000 Units, Subcutaneous, Q8H DAYO  levETIRAcetam, 500 mg, Intravenous, Q12H DAYO  LORazepam, 2 mg, Intravenous, Once      Continuous IV Infusions:  multi-electrolyte, 125 mL/hr, Intravenous, Continuous      PRN Meds:  LORazepam, 2 mg, Intravenous, Q4H PRN      ED Triage Vitals [10/16/24 2205]   Temperature Pulse Respirations Blood Pressure SpO2 Pain Score   98.8 °F (37.1 °C) 92 20 144/74 98 % No Pain     Weight (last 2 days)       Date/Time Weight    10/17/24 0130 78.1 (172.18)    10/16/24 2348 78.1 (172.18)            Vital Signs (last 3 days)       Date/Time Temp Pulse Resp BP MAP (mmHg) SpO2 O2 Device Patient  Position - Orthostatic VS [place] Coma Scale Score Pain    10/17/24 1200 -- 87 19 130/77 97 98 % -- -- 14 No Pain    10/17/24 1100 99 °F (37.2 °C) 83 15 118/72 88 98 % None (Room air) Lying -- No Pain    10/17/24 1000 -- 102 16 118/59 81 99 % -- -- -- --    10/17/24 0900 -- 64 14 130/80 97 99 % -- -- -- --    10/17/24 0800 -- 85 17 117/62 83 98 % -- -- 14 No Pain    10/17/24 0700 99.1 °F (37.3 °C) 92 19 114/68 84 97 % -- -- -- --    10/17/24 0600 -- 98 14 125/82 97 99 % -- -- -- --    10/17/24 0500 -- 83 15 106/58 78 97 % -- -- -- --    10/17/24 0407 99.5 °F (37.5 °C) 96 20 116/67 85 97 % None (Room air) Lying -- --    10/17/24 0400 -- 97 20 116/67 85 97 % -- -- 14 --    10/17/24 0300 -- 102 21 112/61 79 97 % -- -- -- --    10/17/24 0200 -- 92 20 121/73 91 97 % -- -- -- --    10/17/24 0130 99 °F (37.2 °C) 80 20 133/82 102 100 % None (Room air) Lying 14 No Pain    10/17/24 0100 -- 95 -- 116/73 87 98 % -- -- -- --    10/17/24 0015 -- 83 -- 111/64 83 97 % -- -- -- --    10/16/24 2345 -- 85 23 132/85 103 97 % -- -- -- --    10/16/24 2315 -- 78 20 140/86 108 97 % -- -- -- --    10/16/24 2300 -- 92 23 133/84 102 95 % -- -- -- --    10/16/24 2205 98.8 °F (37.1 °C) 92 20 144/74 100 98 % None (Room air) Sitting -- No Pain    10/16/24 2200 -- -- -- -- -- -- -- -- 11 --              Pertinent Labs/Diagnostic Test Results:   Radiology:  CT head without contrast   Final Interpretation by Marcela Roberts MD (10/17 0036)   No acute intracranial abnormality.      XR chest 1 view portable   Final Interpretation by Fadi Pressley MD (10/17 0833)   No acute cardiopulmonary disease.        Cardiology:  ECG 12 lead   Final Result by Oscar Jauregui MD (10/17 0822)   Normal sinus rhythm with sinus arrhythmia   Normal ECG   When compared with ECG of 16-OCT-2024 20:59, (unconfirmed)   Premature ventricular complexes are no longer Present   Nonspecific T wave abnormality no longer evident in Anterior leads   Confirmed by Shania,  Oscar ([id number]) on 10/17/2024 8:22:40 AM        GI:  No orders to display     Results from last 7 days   Lab Units 10/17/24  0501 10/16/24  2220 10/16/24  1625   WBC Thousand/uL 14.96* 24.97* 15.80*   HEMOGLOBIN g/dL 12.2 14.2 15.1   HEMATOCRIT % 37.0 47.6 48.4   PLATELETS Thousands/uL 225 315  315 302   TOTAL NEUT ABS Thousands/µL  --  17.72* 10.21*     Results from last 7 days   Lab Units 10/17/24  0501 10/16/24  2220 10/16/24  1625   SODIUM mmol/L 137 141 140   POTASSIUM mmol/L 5.6* 3.2* 3.4*   CHLORIDE mmol/L 109* 102 102   CO2 mmol/L 21 9* 11*   ANION GAP mmol/L 7 30* 27*   BUN mg/dL 8 12 13   CREATININE mg/dL 0.69 1.03 1.06   EGFR ml/min/1.73sq m 134 102 99   CALCIUM mg/dL 8.2* 9.3 10.0   CALCIUM, IONIZED mmol/L 1.05*  --   --    MAGNESIUM mg/dL 2.2 2.1  --    PHOSPHORUS mg/dL 2.7 3.2  --      Results from last 7 days   Lab Units 10/17/24  0501 10/16/24  2220 10/16/24  1625   AST U/L  --   --  14   ALT U/L  --   --  12   ALK PHOS U/L  --   --  69   TOTAL PROTEIN g/dL  --   --  7.6   ALBUMIN g/dL  --   --  5.0   TOTAL BILIRUBIN mg/dL  --   --  0.34   AMMONIA umol/L 39 179* 178*     Results from last 7 days   Lab Units 10/16/24  2155 10/16/24  1612   POC GLUCOSE mg/dl 126 106     Results from last 7 days   Lab Units 10/17/24  0501 10/16/24  2220 10/16/24  1625   GLUCOSE RANDOM mg/dL 89 123 101     Results from last 7 days   Lab Units 10/16/24  1625   CK TOTAL U/L 76     Results from last 7 days   Lab Units 10/16/24  2220   PROCALCITONIN ng/ml 0.10     Results from last 7 days   Lab Units 10/16/24  2353 10/16/24  1625   LACTIC ACID mmol/L 1.3 17.5*     Results from last 7 days   Lab Units 10/16/24  1813   CLARITY UA  Clear   COLOR UA  Yellow   SPEC GRAV UA  1.025   PH UA  6.0   GLUCOSE UA mg/dl Negative   KETONES UA mg/dl Negative   BLOOD UA  Negative   PROTEIN UA mg/dl 30 (1+)*   NITRITE UA  Negative   BILIRUBIN UA  Negative   UROBILINOGEN UA E.U./dl 0.2   LEUKOCYTES UA  Negative   WBC UA /hpf None Seen   RBC UA  /hpf None Seen   BACTERIA UA /hpf None Seen   EPITHELIAL CELLS WET PREP /hpf Occasional     Results from last 7 days   Lab Units 10/16/24  1813   AMPH/METH  Positive*   BARBITURATE UR  Negative   BENZODIAZEPINE UR  Negative   COCAINE UR  Negative   METHADONE URINE  Negative   OPIATE UR  Negative   PCP UR  Negative   THC UR  Positive*     Results from last 7 days   Lab Units 10/17/24  0019   BLOOD CULTURE  Received in Microbiology Lab. Culture in Progress.  Received in Microbiology Lab. Culture in Progress.     Past Medical History:   Diagnosis Date    Seizure (HCC)      Present on Admission:   Polysubstance abuse (HCC)   Metabolic acidosis   Epileptic seizure, generalized (HCC)   SIRS (systemic inflammatory response syndrome) (HCC)      Admitting Diagnosis: Substance abuse (HCC) [F19.10]  Seizure (HCC) [R56.9]  Hyperammonemia (HCC) [E72.20]  History of noncompliance with medical treatment [Z91.199]  Polysubstance abuse (HCC) [F19.10]  Breakthrough seizure (HCC) [G40.919]  Medication reaction, initial encounter [T50.905A]  Known medical problems [Z78.9]  Age/Sex: 24 y.o. male    Network Utilization Review Department  ATTENTION: Please call with any questions or concerns to [phone] and carefully listen to the prompts so that you are directed to the right person. All voicemails are confidential.   For Discharge needs, contact Care Management DC Support Team at [phone] opt. 2  Send all requests for admission clinical reviews, approved or denied determinations and any other requests to dedicated fax number below belonging to the campus where the patient is receiving treatment. List of dedicated fax numbers for the Facilities:  FACILITY NAME UR FAX NUMBER   ADMISSION DENIALS (Administrative/Medical Necessity) [phone]   DISCHARGE SUPPORT TEAM (NETWORK) [phone]   PARENT CHILD HEALTH (Maternity/NICU/Pediatrics) [phone]   [facility] [phone]   [facility]  [facility] [phone]   [facility] [phone]   [facility] [phone]   [facility] [phone]   [facility] [phone]   [facility] [phone]   [facility] [phone]   [facility] [phone]   [facility] [phone]   [facility] [phone]   [facility] [phone]

## 2024-10-17 NOTE — DISCHARGE INSTR - OTHER ORDERS
Here is some resources for you:  Drug and Alcohol providers                                                                                                  [facility] Phone: [phone]      [facility] Phone: [phone]     [facility] Phone: [phone]     [facility] [place] Phone: [phone]     [facility] Phone: [phone]     [place] [facility] Phone: [phone]    Mental Health Providers                                                                                              [facility] [phone]   [facility] [phone]   State mental health facility [phone]   [facility] [phone]  [facility] [phone]  [facility]) [phone]  [facility] [phone]  [facility] [phone]  [facility] Services [phone]    Mental Health Case Management Support    Address: [place] [address]    Phone [phone]      [facility] Crisis  Call: [phone]   Text: [phone]   Website with chat feature: [email or web address]                                      Services are available for anyone who calls, chats, texts, walks in, or requests a Crisis Specialist to come to their home, school, or community.

## 2024-10-17 NOTE — ASSESSMENT & PLAN NOTE
Recurrent seizures in the context of AED noncompliance and amphetamine/methamphetamine use.  -Recommend one-time Keppra load 3 g IV or p.o. followed by resumption of outpatient regiment 1.5 g twice daily.  - Can resume Zonegran at outpatient regiment 300 mg daily.  -No clear need for any additional neuroimaging or workup at this time.  - After a 24-hour seizure-free period, patient will be cleared for discharge from a neurologic standpoint.  - Office to call patient arrange outpatient follow-up

## 2024-10-17 NOTE — ASSESSMENT & PLAN NOTE
Ammonia level 179 on admission   LFTs normal  Patient noted to be taking Zonigran which can cause hyperammonemia   No note of hepatic encephalopathy as patient was Aox3 with elevated levels in ED, patient became lethargic after receiving ativan  Patient NPO given lethargy  Will hold Zonigran  Can consider Lactulose when patient able to tolerate PO intake  Repeat Ammonia level 10/18

## 2024-10-17 NOTE — H&P
Axis (Degrees)            77                              T Axis (H&P - Critical Care/ICU   Name: Tim Alanis Jr. 24 y.o. male I MRN: [id number]  Unit/Bed#: ED 05 I Date of Admission: 10/16/2024   Date of Service: 10/17/2024 I Hospital Day: 1       Assessment & Plan  Epileptic seizure, generalized (HCC)  Home regimen consisting of Keppra and Zonigran  Has been admitted multiple times due to noncompliance with seizure medication and breakthrough seizures  Patient has visited the ED 3x today before leaving AMA, patient returned due to seizure like activity in the car with mother and mother brought patient back to the ED  Will be admitted to ICU due to postictal state   Last admission patient was intubated due to airway protection after having 5 seizures in one day and transferred to hospitals where he left AMA on 10/8  Patient was instructed to follow up with neurologist in [place] and continue Keppra and Zonisamide upon discharge  Keppra level <2, will obtain Zonisamide level  Patient received 2L IVFs in ED with Keppra load  Continue Keppra  PRN Ativan for breakthrough seizures  Seizure precautions  Monitor airway  Neurology consult  CT head pending  Metabolic acidosis  Gapped metabolic acidosis in the setting of elevated LA  Due to recurrent seizure activity   Given 2L IVF in ED during this admission, in total received 3L IVFs   Trend LA until cleared   SIRS (systemic inflammatory response syndrome) (HCC)  Met SIRS criteria given tachypnea and elevated WBC 24.97  Likely reflexive due to seizures  LA 17.5  Will obtain PCT and blood cultures for completeness  Monitor off abx at current time  Trend WBC and fever curve  Polysubstance abuse (HCC)  Known history of polysubstance abuse  Positive for methamphetamines and THC on UDS  Encourage substance use cessation when appropriate  Consult case management for referrals   PRN benzo for withdrawal and seizure precautions  Hyperammonemia (HCC)  Ammonia level 179 on admission   LFTs normal  Patient noted to be taking Zonigran which can cause  56  No date: Heart  hyperammonemia   No note of hepatic encephalopathy as patient was Aox3 with elevated levels in ED, patient became lethargic after receiving ativan  Patient NPO given lethargy  Will hold Zonigran  Can consider Lactulose when patient able to tolerate PO intake  Repeat Ammonia level 10/18  Disposition: Stepdown Level 1    History of Present Illness   Tim Alanis Jr. is a 24 y.o. male with PMH of generalized seizures, medication noncompliance and polysubstance abuse who presents with seizure like activity. This is patient's 3rd visit to ED today before leaving AMA. The patient would have seizure-like activity in the car and mom would drive patient back to ED to be evaluated. In ED, LA 17.5, in total today patient has received 3L IVFs, keppra load and 2mg ativan. Patient has been intubated on last admission due to airway protection and will be admitted under CC for closer monitoring and seizure precautions.     History obtained from chart review and unobtainable from patient due to mental status.  Review of Systems: Review of Systems   Unable to perform ROS: Mental status change       Historical Information   Past Medical History:  No date: Seizure (HCC) No past surgical history on file.   Current Outpatient Medications   Medication Instructions    levETIRAcetam (KEPPRA) 1,500 mg, Oral, Every 12 hours scheduled    zonisamide (ZONEGRAN) 300 mg, Oral, Daily    No Known Allergies   Social History     Tobacco Use    Smoking status: Former     Current packs/day: 1.00     Average packs/day: 1 pack/day for 5.0 years (5.0 ttl pk-yrs)     Types: Cigarettes    Smokeless tobacco: Never   Vaping Use    Vaping status: Never Used   Substance Use Topics    Alcohol use: Not Currently    Drug use: Yes     Types: Marijuana     Comment: per mother pt has history of meth amphetamine abuse    History reviewed. No pertinent family history.       Objective :                   Vitals I/O      Most Recent Min/Max in 24hrs   Temp 98.8 °F (37.1  valve disorder      Comment:  MVP  No date: Hyperlipoproteinemia  02/01/2019: Long term (current) use of anticoagulants-coumadin  1997: Malignant neoplasm (CMD)      Comment:  Uterine: early stage: had a hysterectomy: patient                reported  08/30/2017: Mild mitral valve prolapse      Comment:  ECHO 8/8/2017 Echo 8/08/17:  LVEF 75%Normal LV size, SF                , with no regional wall motion abnormalities.Grade I/IV                DD.Mild P. HTN, RVSP 29.7 mmHg. Moderately increased LA                size.Trace MV regurgitation,Prob MVP..TV prolapse.  01/18/2018: Mild pulmonary hypertension (CMD)      Comment:  Echo 8/08/17:  LVEF 75%Normal LV size, SF , with no                regional wall motion abnormalities.Grade I/IV DD.Mild P.                HTN, RVSP 29.7 mmHg. Moderately increased LA size.Trace                MV regurgitation..TV prolapse.  08/30/2017: Mixed hyperlipidemia  No date: Osteoporosis  07/07/2017: Palpitations  08/30/2017: Paroxysmal atrial fibrillation (CMD)      Comment:  Paroxysmal AFib:CHADS 3,on AC-Coumadin.Echo 8/08/17:                 LVEF 75%Normal LV size, SF , with no regional wall motion               abnormalities.Grade I/IV DD.Mild P. HTN, RVSP 29.7 mmHg.                Moderately increased LA size.Trace MV regurgitation..TV                prolapse.  No date: Stroke (CMD)  01/18/2018: Tricuspid valve prolapse      Comment:  .Echo 8/08/17:  LVEF 75%Normal LV size, SF , with no                regional wall motion abnormalities.Grade I/IV DD.Mild P.                HTN, RVSP 29.7 mmHg. Moderately increased LA size.Trace                MV regurgitation..TV prolapse.  02/15/2013: Uterine cancer (CMS/HCC), history of      Comment:  Uterine stage one. 2008 hysterectomy     Past Surgical History:  No date: Breast biopsy; Right      Comment:  Yrs ago  06/05/2015: Colonoscopy      Comment:  [place], Diverticulosis  No date: Eye surgery  No date: Holter monitor  No date:  °C) Temp  Min: 98.4 °F (36.9 °C)  Max: 98.8 °F (37.1 °C)   Pulse 83 Pulse  Min: 78  Max: 125   Resp (!) 23 Resp  Min: 16  Max: 23   /64 BP  Min: 111/64  Max: 157/93   O2 Sat 97 % SpO2  Min: 95 %  Max: 98 %    No intake or output data in the 24 hours ending 10/17/24 0032    Diet NPO    Invasive Monitoring           Physical Exam   Physical Exam  Eyes:      Extraocular Movements: Extraocular movements intact.      Pupils: Pupils are equal, round, and reactive to light.   Skin:     General: Skin is warm and dry.      Capillary Refill: Capillary refill takes less than 2 seconds.   HENT:      Head: Normocephalic.      Mouth/Throat:      Mouth: Mucous membranes are moist.   Cardiovascular:      Rate and Rhythm: Normal rate and regular rhythm.      Pulses: Normal pulses.      Heart sounds: Normal heart sounds.   Musculoskeletal:      Right lower leg: No edema.      Left lower leg: No edema.   Abdominal: General: Bowel sounds are normal. There is no distension.      Palpations: Abdomen is soft.   Constitutional:       General: He is not in acute distress.     Appearance: He is well-developed and well-nourished. He is not ill-appearing.   Pulmonary:      Effort: Pulmonary effort is normal. No respiratory distress.      Breath sounds: Normal breath sounds. No wheezing or rhonchi.   Neurological:      Mental Status: He is somnolent.      Motor: Strength full and intact in all extremities.      Comments: Patient would open eyes, grumbles with tactile stimuli, grunts once to answer questions, moves all extremities equally but does not follow command          Diagnostic Studies        Lab Results: I have reviewed the following results:     Medications:  Scheduled PRN   chlorhexidine, 15 mL, Q12H DAYO  heparin (porcine), 5,000 Units, Q8H DAYO  levETIRAcetam, 500 mg, Q12H DAYO  potassium chloride, 20 mEq, Once   Followed by  potassium chloride, 20 mEq, Once   Followed by  potassium chloride, 20 mEq, Once      LORazepam, 2 mg,  Q4H PRN       Continuous          Labs:   CBC    Recent Labs     10/16/24  1625 10/16/24  2220   WBC 15.80* 24.97*   HGB 15.1 14.2   HCT 48.4 47.6    315     BMP    Recent Labs     10/16/24  1625 10/16/24  2220   SODIUM 140 141   K 3.4* 3.2*    102   CO2 11* 9*   AGAP 27* 30*   BUN 13 12   CREATININE 1.06 1.03   CALCIUM 10.0 9.3       Coags    No recent results     Additional Electrolytes  No recent results       Blood Gas    No recent results  No recent results LFTs  Recent Labs     10/16/24  1625   ALT 12   AST 14   ALKPHOS 69   ALB 5.0   TBILI 0.34       Infectious  No recent results  Glucose  Recent Labs     10/16/24  1625 10/16/24  2220   GLUC 101 123           Assessment: Alert and oriented and No acute distress    Dental Exam    Patient has:  Lower dentures and Upper dentures    Pulmonary Exam  Pulmonary exam normal  Breath sounds clear to auscultation:  Yes  Patient Demonstrates:  Non-labored Breathing      Anesthesia Plan:    ASA Status: 3  Anesthesia Type: MAC    Induction: Intravenous  Preferred Airway Type: MaskPatient does not have a difficult airway or is not at risk of aspiration.   Maintenance: TIVA  Premedication: None      Post-op Pain Management: Per Surgeon  Postoperative analgesia plan does NOT include opiods    Checklist  Reviewed: Lab Results, EKG, Care Everywhere, Allergies, Patient Summary, Past Med History, Medications, Nursing Notes, NPO Status, Problem list and Outside Records  Consent/Risks Discussed Statement:  The proposed anesthetic plan, including its risks and benefits, have been discussed with the Patient along with the risks and benefits of alternatives. Questions were encouraged and answered and the patient and/or representative understands and agrees to proceed.        I discussed with the patient (and/or patient's legal representative) the risks and benefits of the proposed anesthesia plan, MAC, which may include services performed by other anesthesia providers.    Alternative anesthesia plans, if available, were reviewed with the patient (and/or patient's legal representative). Discussion has been held with the patient (and/or patient's legal representative) regarding risks of anesthesia, which include intra-operative awareness and emergent situations that may require change in anesthesia plan.    The patient (and/or patient's legal representative) has indicated understanding, his/her questions have been answered, and he/she wishes to proceed with the planned anesthetic.      Blood Products: Not Anticipated

## 2024-10-17 NOTE — ASSESSMENT & PLAN NOTE
Gapped metabolic acidosis in the setting of elevated LA  Due to recurrent seizure activity   Given 2L IVF in ED during this admission, in total received 3L IVFs   Trend LA until cleared

## 2024-10-17 NOTE — ASSESSMENT & PLAN NOTE
Met SIRS criteria given tachypnea and elevated WBC 24.97  Likely reflexive due to seizures  LA 17.5  Will obtain PCT and blood cultures for completeness  Monitor off abx at current time  Trend WBC and fever curve

## 2024-10-17 NOTE — ED PROVIDER NOTES
Time reflects when diagnosis was documented in both MDM as applicable and the Disposition within this note       Time User Action Codes Description Comment    10/16/2024 11:39 PM Fadi Chandler Add [R56.9] Seizure (HCC)     10/16/2024 11:39 PM Fadi Chandler Add [Z91.A91] Caregiver's noncompliance with patient's other medical treatment and regimen due to financial hardship     10/16/2024 11:39 PM Fadi Chandler Remove [Z91.A91] Caregiver's noncompliance with patient's other medical treatment and regimen due to financial hardship     10/16/2024 11:39 PM Fadi Chandler Add [Z91.199] History of noncompliance with medical treatment     10/16/2024 11:40 PM Fadi Chandler Add [E72.20] Hyperammonemia (HCC)     10/16/2024 11:40 PM Fadi Chandler Add [T50.905A] Medication reaction, initial encounter     10/16/2024 11:40 PM Fadi Chandler Add [F19.10] Substance abuse (HCC)           ED Disposition       ED Disposition   Admit    Condition   Stable    Date/Time   Wed Oct 16, 2024 11:39 PM    Comment                  Assessment & Plan       Medical Decision Making  Patient presented to the emergency department and a MSE was performed. The patient was evaluated for complaint related to acute change in mental status.  Patient is potentially at risk for, but not limited to, acid-base imbalance, endocrine disorder, seizure disorder, multifactorial or single etiology encephalopathy, pneumonia, urinary tract infection, pyelonephritis, other infectious process, seizure disorder, substance use disorder, medication overdose, trauma, hypo or hyperglycemia, uremia, syncope, stroke, substance use disorder or mental health disorder. Several of these diagnoses have been evaluated and ruled out by history and physical.  As needed, patient will be further evaluated with laboratory and imaging studies.  Higher level diagnostics, such as CT imaging or ultrasound, may also be required.  Please see work-up portion of the note for further  evaluation of patient's risk.  Socioeconomic factors were also considered as part of the decision-making process.  Unless otherwise stated in the chart or patient is admitted as elsewhere documented, any previously prescribed medications will be maintained.      Problems Addressed:  History of noncompliance with medical treatment: chronic illness or injury with exacerbation, progression, or side effects of treatment  Hyperammonemia (HCC): chronic illness or injury with exacerbation, progression, or side effects of treatment  Medication reaction, initial encounter: undiagnosed new problem with uncertain prognosis  Seizure (HCC): chronic illness or injury with severe exacerbation, progression, or side effects of treatment that poses a threat to life or bodily functions  Substance abuse (HCC): chronic illness or injury with exacerbation, progression, or side effects of treatment that poses a threat to life or bodily functions    Amount and/or Complexity of Data Reviewed  Labs: ordered. Decision-making details documented in ED Course.  Radiology: ordered. Decision-making details documented in ED Course.  Discussion of management or test interpretation with external provider(s): Care managed with critical care team.    Risk  Prescription drug management.  Decision regarding hospitalization.  Risk Details: Patient presented to the emergency department and a MSE was performed. The patient was evaluated and diagnosed with acute exacerbation of seizure disorder secondary to noncompliance and substance abuse disorder. This is a new issue that will require additional planned work-up and treatment in a hospitalized setting. As may have been required as part of this evaluation, clinical laboratory test, radiology imaging and medical testing (I.e. EKG) were ordered as necessitated by the patient's presentation. I independently reviewed these studies, imaging and testing. This patient's case is considered to be a considerable risk  secondary to the above listed disease process and poses a threat to the patient's well-being and baseline function. Further in-patient diagnostic testing and management, which may include the administration of parenteral medications, is required.            ED Course as of 10/16/24 2342   Wed Oct 16, 2024   2328 ICU team to see patient.       Medications   LORazepam (ATIVAN) injection 2 mg (2 mg Intravenous Given 10/16/24 2205)   levETIRAcetam (KEPPRA) injection 1,000 mg (1,000 mg Intravenous Given 10/16/24 2222)   multi-electrolyte (ISOLYTE-S PH 7.4) bolus 2,000 mL (2,000 mL Intravenous New Bag 10/16/24 2241)       ED Risk Strat Scores                                               History of Present Illness       Chief Complaint   Patient presents with    Medical Problem     Pt arrived to front of ED in car with his mother. Pt pulled from car by this nurse, Meghna RN, Agustín VALLECILLO RN and Valarie RN to stretcher and taken directly back to room 5 in ED. Pt actively seizing when nurses arrived at car. Pt maintaining airway. Security also assisted.        Past Medical History:   Diagnosis Date    Seizure (HCC)       History reviewed. No pertinent surgical history.   History reviewed. No pertinent family history.   Social History     Tobacco Use    Smoking status: Former     Current packs/day: 1.00     Average packs/day: 1 pack/day for 5.0 years (5.0 ttl pk-yrs)     Types: Cigarettes    Smokeless tobacco: Never   Vaping Use    Vaping status: Never Used   Substance Use Topics    Alcohol use: Not Currently    Drug use: Yes     Types: Marijuana     Comment: per mother pt has history of meth amphetamine abuse      E-Cigarette/Vaping    E-Cigarette Use Never User       E-Cigarette/Vaping Substances      I have reviewed and agree with the history as documented.     As we warned this patient when he signed out AGAINST MEDICAL ADVICE a short time ago, this 24-year-old male returns to the emergency room with recurrent seizure that  occurred in the car as he was departing the emergency room with his mother.  Patient arrives in our emergency room postictal.  Is provided 2 mg of Ativan intravenously and is now resting.      History provided by:  Patient  Seizure - Prior Hx Of  Seizure activity on arrival: no    Seizure type:  Grand mal  Episode characteristics: unresponsiveness    Postictal symptoms: somnolence    Return to baseline: no    Context: medical non-compliance    Context comment:  Substance use disorder  Recent head injury:  No recent head injuries      Review of Systems   Unable to perform ROS: Mental status change           Objective       ED Triage Vitals [10/16/24 2205]   Temperature Pulse Blood Pressure Respirations SpO2 Patient Position - Orthostatic VS   98.8 °F (37.1 °C) 92 144/74 20 98 % Sitting      Temp Source Heart Rate Source BP Location FiO2 (%) Pain Score    Temporal Monitor Left arm -- No Pain      Vitals      Date and Time Temp Pulse SpO2 Resp BP Pain Score FACES Pain Rating User   10/16/24 2315 -- 78 97 % 20 140/86 -- -- BA   10/16/24 2300 -- 92 95 % 23 133/84 -- -- BA   10/16/24 2205 98.8 °F (37.1 °C) 92 98 % 20 144/74 No Pain -- BA            Physical Exam  Vitals and nursing note reviewed.   Constitutional:       General: He is in acute distress.      Appearance: He is normal weight. He is not ill-appearing or toxic-appearing.   HENT:      Head: Normocephalic and atraumatic.      Right Ear: External ear normal.      Left Ear: External ear normal.      Nose: Nose normal.      Mouth/Throat:      Mouth: Mucous membranes are moist.   Cardiovascular:      Rate and Rhythm: Normal rate.   Pulmonary:      Effort: Pulmonary effort is normal. No respiratory distress.      Breath sounds: No wheezing or rales.   Abdominal:      General: Abdomen is flat. There is no distension.   Musculoskeletal:         General: No signs of injury.   Skin:     Coloration: Skin is not pale.   Neurological:      General: No focal deficit  present.      Mental Status: He is unresponsive.      Comments: Patient currently unresponsive secondary to postictal state and being provided 2 mg of Ativan.         Results Reviewed       Procedure Component Value Units Date/Time    Basic metabolic panel [[id number]]  (Abnormal) Collected: 10/16/24 2220    Lab Status: Final result Specimen: Blood from Arm, Left Updated: 10/16/24 2246     Sodium 141 mmol/L      Potassium 3.2 mmol/L      Chloride 102 mmol/L      CO2 9 mmol/L      ANION GAP 30 mmol/L      BUN 12 mg/dL      Creatinine 1.03 mg/dL      Glucose 123 mg/dL      Calcium 9.3 mg/dL      eGFR 102 ml/min/1.73sq m     Narrative:      National Kid[employer] Foundation guidelines for Chronic Kidney Disease (CKD):     Stage 1 with normal or high GFR (GFR > 90 mL/min/1.73 square meters)    Stage 2 Mild CKD (GFR = 60-89 mL/min/1.73 square meters)    Stage 3A Moderate CKD (GFR = 45-59 mL/min/1.73 square meters)    Stage 3B Moderate CKD (GFR = 30-44 mL/min/1.73 square meters)    Stage 4 Severe CKD (GFR = 15-29 mL/min/1.73 square meters)    Stage 5 End Stage CKD (GFR <15 mL/min/1.73 square meters)  Note: GFR calculation is accurate only with a steady state creatinine    Ammonia [[id number]]  (Abnormal) Collected: 10/16/24 2220    Lab Status: Final result Specimen: Blood from Arm, Left Updated: 10/16/24 2246     Ammonia 179 umol/L     CBC and differential [[id number]]  (Abnormal) Collected: 10/16/24 2220    Lab Status: Final result Specimen: Blood from Arm, Left Updated: 10/16/24 2228     WBC 24.97 Thousand/uL      RBC 4.69 Million/uL      Hemoglobin 14.2 g/dL      Hematocrit 47.6 %       fL      MCH 30.3 pg      MCHC 29.8 g/dL      RDW 11.9 %      MPV 11.1 fL      Platelets 315 Thousands/uL      nRBC 0 /100 WBCs      Segmented % 71 %      Immature Grans % 1 %      Lymphocytes % 13 %      Monocytes % 14 %      Eosinophils Relative 1 %      Basophils Relative 0 %      Absolute Neutrophils 17.72 Thousands/µL       Absolute Immature Grans 0.27 Thousand/uL      Absolute Lymphocytes 3.22 Thousands/µL      Absolute Monocytes 3.51 Thousand/µL      Eosinophils Absolute 0.19 Thousand/µL      Basophils Absolute 0.06 Thousands/µL     Fingerstick Glucose (POCT) [[id number]]  (Normal) Collected: 10/16/24 2155    Lab Status: Final result Specimen: Blood Updated: 10/16/24 2155     POC Glucose 126 mg/dl             XR chest 1 view portable    (Results Pending)   CT head without contrast    (Results Pending)       Procedures    ED Medication and Procedure Management   Prior to Admission Medications   Prescriptions Last Dose Informant Patient Reported? Taking?   levETIRAcetam (KEPPRA) 500 mg tablet   No No   Sig: Take 3 tablets (1,500 mg total) by mouth every 12 (twelve) hours   zonisamide (ZONEGRAN) 100 mg capsule   No No   Sig: Take 3 capsules (300 mg total) by mouth daily      Facility-Administered Medications: None     Patient's Medications   Discharge Prescriptions    No medications on file     No discharge procedures on file.  ED SEPSIS DOCUMENTATION   Time reflects when diagnosis was documented in both MDM as applicable and the Disposition within this note       Time User Action Codes Description Comment    10/16/2024 11:39 PM Fadi Chandler [R56.9] Seizure (HCC)     10/16/2024 11:39 PM Fadi Chandler [Z91.A91] Caregiver's noncompliance with patient's other medical treatment and regimen due to financial hardship     10/16/2024 11:39 PM Fadi Chandler Remove [Z91.A91] Caregiver's noncompliance with patient's other medical treatment and regimen due to financial hardship     10/16/2024 11:39 PM Fadi Chandler Add [Z91.199] History of noncompliance with medical treatment     10/16/2024 11:40 PM Fadi Chandler [E72.20] Hyperammonemia (HCC)     10/16/2024 11:40 PM Fadi Chandler [T50.905A] Medication reaction, initial encounter     10/16/2024 11:40 PM Fadi Chandler [F19.10] Substance abuse ([facility])                  Fadi MCRAE  DO Geraldine  10/16/24 3729

## 2024-10-17 NOTE — ASSESSMENT & PLAN NOTE
Home regimen consisting of Keppra and Zonigran  Has been admitted multiple times due to noncompliance with seizure medication and breakthrough seizures  Patient has visited the ED 3x today before leaving AMA, patient returned due to seizure like activity in the car with mother and mother brought patient back to the ED  Will be admitted to ICU due to postictal state   Last admission patient was intubated due to airway protection after having 5 seizures in one day and transferred to [facility] where he left AMA on 10/8  Patient was instructed to follow up with neurologist in [place] and continue Keppra and Zonisamide upon discharge  Keppra level <2, will obtain Zonisamide level  Patient received 2L IVFs in ED with Keppra load  Continue Keppra  PRN Ativan for breakthrough seizures  Seizure precautions  Monitor airway  Neurology consult  CT head pending

## 2024-10-17 NOTE — PROGRESS NOTES
Progress Note - Critical Care/ICU   Name: Tim Alanis Jr. 24 y.o. male I MRN: [id number]  Unit/Bed#: -01 I Date of Admission: 10/16/2024   Date of Service: 10/17/2024 I Hospital Day: 1       Critical Care Interval Transfer Note:    Brief Hospital Summary: Hospital Course: No notes on file Pt came to er for seizures and left ama x 2. Pt admitted for seizure after 3rd ER visit. Pt was not taking his home keppra and zonegran per the levels checked. Pt was keppra loaded and zonegran restarted today. Initially held 2/2 hyperammonemia. Neuro consulted. Restarted home doses and will monitor one more day. Will need f/u with a pcp. Pt also had uds + amph/thc. Pt stable for transfer to [facility].    Barriers to discharge:   Monitor efficacy of seizure meds     Consults: IP CONSULT TO CASE MANAGEMENT  IP CONSULT TO NEUROLOGY    Recommended to review admission imaging for incidental findings and document in discharge navigator: Chart reviewed, no known incidental findings noted at this time.      Discharge Plan: Anticipate discharge in 24-48 hrs to home.       Patient seen and evaluated by Critical Care today and deemed to be appropriate for transfer to Med Surg. Spoke to Dr. Martin from [facility] to accept transfer. Critical care can be contacted via SecureChat with any questions or concerns.

## 2024-10-17 NOTE — CONSULTS
Consultation - Neurology   Name: Tim Alanis Jr. 24 y.o. male I MRN: [id number]  Unit/Bed#: -01 I Date of Admission: 10/16/2024   Date of Service: 10/17/2024 I Hospital Day: 1   Inpatient consult to Neurology  Consult performed by: Sanket Smiley DO  Consult ordered by: SOLITARIO Upton        Physician Requesting Evaluation: Santiago Magana MD *   Reason for Evaluation / Principal Problem: Breakthrough seizure    Assessment & Plan  Epileptic seizure, generalized (HCC)  Recurrent seizures in the context of AED noncompliance and amphetamine/methamphetamine use.  -Recommend one-time Keppra load 3 g IV or p.o. followed by resumption of outpatient regiment 1.5 g twice daily.  - Can resume Zonegran at outpatient regiment 300 mg daily.  -No clear need for any additional neuroimaging or workup at this time.  - After a 24-hour seizure-free period, patient will be cleared for discharge from a neurologic standpoint.  - Office to call patient arrange outpatient follow-up  SIRS (systemic inflammatory response syndrome) (HCC)    Metabolic acidosis    Polysubstance abuse (HCC)    Hyperammonemia (HCC)        Tim Alanis Jr. will need follow up in in 6 weeks with epilepsy attending or advance practitioner. He will not require outpatient neurological testing.    History of Present Illness   Tim Alanis Jr. is a 24 y.o. right handed male history of generalized epilepsy maintained on Keppra 1500 mg twice daily and Zonegran 300 mg daily, medication noncompliance, and polysubstance abuse who presents with breakthrough seizure.  -CMP with mild elevation in anion gap and decreased CO2, which have since resolved.  - Lactic acid 17.5 on arrival normalized several hours later.  - Leukocytosis, approximately 16K on arrival, 25K 6 hours later, 15K 7 hours after that.  - UA negative  - UDS positive for amphetamines/methamphetamines and THC.  - Keppra level undetectable.  - Zonegran level pending  - CT head  unremarkable.    Multiple prior admissions for the same, breakthrough seizures in the context of noncompliance with AEDs.    Patient currently awake participate with exam remains covered in bed however RN just reported he stood up to walk and use the bathroom without assistance or difficulty.  Is conversant, and overall just appears irritable and frustrated that he is in the hospital.            Review of Systems   Unable to perform ROS: Other (Patient not cooperative)        FullI have reviewed the patient's PMH, PSH, Social History, Family History, Meds, and Allergies    Objective :  Temp:  [98.4 °F (36.9 °C)-99.5 °F (37.5 °C)] 99 °F (37.2 °C)  HR:  [] 87  BP: (106-157)/(58-97) 130/77  Resp:  [14-23] 19  SpO2:  [95 %-100 %] 98 %  O2 Device: None (Room air)    Physical Exam  Vitals reviewed: Patient would not participate for exam.     Neurological Exam  Mental Status    Patient would not participate for exam, but was noted by nurse to have gotten up out of bed and ambulated into use the bathroom independently and without difficulty a few minutes prior to my encounter.  He was not confused at that time, interacted appropriately with her, but overall appeared irritable..        Lab Results: I have reviewed the following results:I have personally reviewed pertinent reports.    Recent Labs     10/17/24  0501   WBC 14.96*   HGB 12.2   HCT 37.0      SODIUM 137   K 5.6*   *   CO2 21   BUN 8   CREATININE 0.69   GLUC 89   CAIONIZED 1.05*   MG 2.2   PHOS 2.7     Imaging Results Review: I personally reviewed the following image studies in PACS and associated radiology reports: CT head. My interpretation of the radiology images/reports is: Unremarkable.  Other Study Results Review: No additional pertinent studies reviewed.    VTE Prophylaxis: VTE covered by:  heparin (porcine), Subcutaneous       Administrative Statements   I have spent a total time of 20 minutes in caring for this patient on the day of the  visit/encounter including Impressions, Counseling / Coordination of care, Documenting in the medical record, Reviewing / ordering tests, medicine, procedures  , Obtaining or reviewing history  , and Communicating with other healthcare professionals .  VIRTUAL CARE DOCUMENTATION:     1. This service was provided via Telemedicine using POI Kit     2. Parties in the room with patient during teleconsult RN    3. Confidentiality My office door was closed     4. Participants No one else was in the room    5. Patient acknowledged consent and understanding of privacy and security of the  Telemedicine consult. I informed the patient that I have reviewed their record in Epic and presented the opportunity for them to ask any questions regarding the visit today.  The patient agreed to participate.    6. I have spent a total time of 20 minutes in caring for this patient on the day of the visit/encounter including  as outlined above .

## 2024-10-17 NOTE — ASSESSMENT & PLAN NOTE
Known history of polysubstance abuse  Positive for methamphetamines and THC on UDS  Encourage substance use cessation when appropriate  Consult case management for referrals   PRN benzo for withdrawal and seizure precautions

## 2024-10-17 NOTE — PLAN OF CARE
Problem: PAIN - ADULT  Goal: Verbalizes/displays adequate comfort level or baseline comfort level  Description: Interventions:  - Encourage patient to monitor pain and request assistance  - Assess pain using appropriate pain scale  - Administer analgesics based on type and severity of pain and evaluate response  - Implement non-pharmacological measures as appropriate and evaluate response  - Consider cultural and social influences on pain and pain management  - Notify physician/advanced practitioner if interventions unsuccessful or patient reports new pain  Outcome: Progressing     Problem: INFECTION - ADULT  Goal: Absence or prevention of progression during hospitalization  Description: INTERVENTIONS:  - Assess and monitor for signs and symptoms of infection  - Monitor lab/diagnostic results  - Monitor all insertion sites, i.e. indwelling lines, tubes, and drains  - Monitor endotracheal if appropriate and nasal secretions for changes in amount and color  - [place] appropriate cooling/warming therapies per order  - Administer medications as ordered  - Instruct and encourage patient and family to use good hand hygiene technique  - Identify and instruct in appropriate isolation precautions for identified infection/condition  Outcome: Progressing  Goal: Absence of fever/infection during neutropenic period  Description: INTERVENTIONS:  - Monitor WBC    Outcome: Progressing     Problem: DISCHARGE PLANNING  Goal: Discharge to home or other facility with appropriate resources  Description: INTERVENTIONS:  - Identify barriers to discharge w/patient and caregiver  - Arrange for needed discharge resources and transportation as appropriate  - Identify discharge learning needs (meds, wound care, etc.)  - Arrange for interpretive services to assist at discharge as needed  - Refer to Case Management Department for coordinating discharge planning if the patient needs post-hospital services based on physician/advanced  practitioner order or complex needs related to functional status, cognitive ability, or social support system  Outcome: Progressing     Problem: Knowledge Deficit  Goal: Patient/family/caregiver demonstrates understanding of disease process, treatment plan, medications, and discharge instructions  Description: Complete learning assessment and assess knowledge base.  Interventions:  - Provide teaching at level of understanding  - Provide teaching via preferred learning methods  Outcome: Progressing     Problem: NEUROSENSORY - ADULT  Goal: Achieves stable or improved neurological status  Description: INTERVENTIONS  - Monitor and report changes in neurological status  - Monitor vital signs such as temperature, blood pressure, glucose, and any other labs ordered   - Initiate measures to prevent increased intracranial pressure  - Monitor for seizure activity and implement precautions if appropriate      Outcome: Progressing  Goal: Remains free of injury related to seizures activity  Description: INTERVENTIONS  - Maintain airway, patient safety  and administer oxygen as ordered  - Monitor patient for seizure activity, document and report duration and description of seizure to physician/advanced practitioner  - If seizure occurs,  ensure patient safety during seizure  - Reorient patient post seizure  - Seizure pads on all 4 side rails  - Instruct patient/family to notify RN of any seizure activity including if an aura is experienced  - Instruct patient/family to call for assistance with activity based on nursing assessment  - Administer anti-seizure medications if ordered    Outcome: Progressing  Goal: Achieves maximal functionality and self care  Description: INTERVENTIONS  - Monitor swallowing and airway patency with patient fatigue and changes in neurological status  - Encourage and assist patient to increase activity and self care.   - Encourage visually impaired, hearing impaired and aphasic patients to use  assistive/communication devices  Outcome: Progressing

## 2024-10-17 NOTE — ED NOTES
Pt agitated in room yelling obscenities at mother. Dr. Chandler stating that patient is leaving AMA and he can have his IV taken out. Pt and mother Meghna getting in verbal altercations at bedside. IV removed and patient exited to waiting room with steady gait.      Valarie Albarran RN  10/16/24 7075

## 2024-10-17 NOTE — ED PROVIDER NOTES
Time reflects when diagnosis was documented in both MDM as applicable and the Disposition within this note       Time User Action Codes Description Comment    10/16/2024  9:17 PM Fadi Chandler Add [R56.9] Seizure (HCC)           ED Disposition       ED Disposition   AMA    Condition   --    Date/Time   Wed Oct 16, 2024  9:17 PM    Comment   Date: 10/16/2024  Patient: Tim Alanis Jr.  Admitted: 10/16/2024  8:57 PM  Attending Provider: Fadi Chandler DO    Tim Alanis Jr. or his authorized caregiver has made the decision for the patient to leave the emergency department against th e advice of his attending physician. He or his authorized caregiver has been informed and understands the inherent risks, including death, recurrent seizure or loss of permanent sexual function.  He or his authorized caregiver has decided to accept t he responsibility for this decision. Tim Alanis Jr. and all necessary parties have been advised that he may return for further evaluation or treatment. His condition at time of discharge was stable.  Tim Alanis Jr. had current vital signs as  follows:  /93 (BP Location: Right arm)   Pulse 103   Temp 98.8 °F (37.1 °C) (Temporal)   Resp 20   Wt 81.5 kg (179 lb 10.8 oz)                Assessment & Plan       Medical Decision Making  Patient presented to the emergency department and a MSE was performed. The patient was evaluated for complaint related to acute change in mental status.  Patient is potentially at risk for, but not limited to, acid-base imbalance, endocrine disorder, seizure disorder, multifactorial or single etiology encephalopathy, pneumonia, urinary tract infection, pyelonephritis, other infectious process, medication overdose, trauma, hypo or hyperglycemia, uremia, syncope, stroke, seizure, substance use disorder or mental health disorder. Several of these diagnoses have been evaluated and ruled out by history and physical.  As needed, patient will be  further evaluated with laboratory and imaging studies.  Higher level diagnostics, such as CT imaging or ultrasound, may also be required.  Please see work-up portion of the note for further evaluation of patient's risk.  Socioeconomic factors were also considered as part of the decision-making process.  Unless otherwise stated in the chart or patient is admitted as elsewhere documented, any previously prescribed medications will be maintained.          ED Course as of 10/16/24 2135   Wed Oct 16, 2024   2116 Uses any and all treatment.  Patient is alert aware and oriented x 3.  Patient is requesting to sign out AGAINST MEDICAL ADVICE without treatment.   2133 Patient was advised of all risk about leaving AGAINST MEDICAL ADVICE.  This is as documented in the AMA form.  Despite these warnings, patient still requested no repeat evaluation and continued to demand to be released from the emergency department.  His IV was subsequently discontinued and he was removed from the monitor.  Patient ambulated from the emergency room under his own care.    I had a long conversation with the mother regarding the patient's ability to decline medical treatment and that if she felt that there was a concern that would warrant an involuntary mental health treatment that she should also contact the county.  Mother was encouraged to seek support for herself as well.       Medications - No data to display    ED Risk Strat Scores                                               History of Present Illness       Chief Complaint   Patient presents with    Medical Problem     Per EMS, pt recently discharged from hospital and on way home from hospital pt had a seizure. Seizure was witnessed by pts mother per EMS..        Past Medical History:   Diagnosis Date    Seizure (HCC)       History reviewed. No pertinent surgical history.   History reviewed. No pertinent family history.   Social History     Tobacco Use    Smoking status: Former     Current  "packs/day: 1.00     Average packs/day: 1 pack/day for 5.0 years (5.0 ttl pk-yrs)     Types: Cigarettes    Smokeless tobacco: Never   Vaping Use    Vaping status: Never Used   Substance Use Topics    Alcohol use: Not Currently    Drug use: Yes     Types: Marijuana     Comment: per mother pt has history of meth amphetamine abuse      E-Cigarette/Vaping    E-Cigarette Use Never User       E-Cigarette/Vaping Substances      I have reviewed and agree with the history as documented.     Patient is a 24-year-old male with a history of methamphetamine abuse and tobacco abuse with history of seizure disorder who is presenting to the emergency room with recurrent seizure.  Patient was just seen prior to this visit in our emergency room and had evaluation at that time.  Admission was offered to the patient at that time but he declined.  Patient was being transported home when he had a recurrent seizure.  Patient was brought back to the emergency room by his mother who is at bedside.    Patient for his part is demanding to be released from the emergency room at this time.  He is attempting to pull his IV from his right hand.  Patient understands that he likely had a seizure.  Patient reports that he wants no evaluation and wants to be discharged to home.  Patient is alert aware and oriented x 3 and he denies any suicidality.  Mother is insisting that patient stay, however, patient is demanding that the the mother leave the room and stop \"talking for me.\"      History provided by:  Patient and parent  Medical Problem      Review of Systems   Unable to perform ROS: Other (Patient declined)           Objective       ED Triage Vitals [10/16/24 2101]   Temperature Pulse Blood Pressure Respirations SpO2 Patient Position - Orthostatic VS   98.8 °F (37.1 °C) 103 157/93 20 97 % --      Temp Source Heart Rate Source BP Location FiO2 (%) Pain Score    Temporal Monitor Right arm -- No Pain      Vitals      Date and Time Temp Pulse SpO2 " Resp BP Pain Score FACES Pain Rating User   10/16/24 2101 98.8 °F (37.1 °C) 103 97 % 20 157/93 No Pain -- NM            Physical Exam  Vitals (Patient declined physical exam) and nursing note reviewed.   Constitutional:       General: He is not in acute distress.     Appearance: He is not toxic-appearing.   HENT:      Head: Atraumatic.   Neurological:      Mental Status: He is alert and oriented to person, place, and time.   Psychiatric:         Attention and Perception: Attention normal.         Mood and Affect: Affect is angry.         Speech: He is noncommunicative.         Behavior: Behavior is uncooperative, agitated and aggressive.         Thought Content: Thought content does not include homicidal or suicidal ideation.         Judgment: Judgment is impulsive.         Results Reviewed       None            No orders to display       Procedures    ED Medication and Procedure Management   Prior to Admission Medications   Prescriptions Last Dose Informant Patient Reported? Taking?   levETIRAcetam (KEPPRA) 500 mg tablet   No No   Sig: Take 3 tablets (1,500 mg total) by mouth every 12 (twelve) hours   zonisamide (ZONEGRAN) 100 mg capsule   No No   Sig: Take 3 capsules (300 mg total) by mouth daily      Facility-Administered Medications: None     Discharge Medication List as of 10/16/2024  9:23 PM        CONTINUE these medications which have NOT CHANGED    Details   levETIRAcetam (KEPPRA) 500 mg tablet Take 3 tablets (1,500 mg total) by mouth every 12 (twelve) hours, Starting Mon 4/29/2024, No Print      zonisamide (ZONEGRAN) 100 mg capsule Take 3 capsules (300 mg total) by mouth daily, Starting Wed 2/14/2024, Until Mon 4/29/2024, Normal           No discharge procedures on file.  ED SEPSIS DOCUMENTATION   Time reflects when diagnosis was documented in both MDM as applicable and the Disposition within this note       Time User Action Codes Description Comment    10/16/2024  9:17 PM Fadi Chandler Add [R56.9] Seizure  (HCC)                  Fadi Chandler,   10/16/24 0881

## 2024-10-18 ENCOUNTER — PATIENT OUTREACH (OUTPATIENT)
Dept: CASE MANAGEMENT | Facility: OTHER | Age: 24
End: 2024-10-18

## 2024-10-18 VITALS
RESPIRATION RATE: 18 BRPM | TEMPERATURE: 99.2 F | OXYGEN SATURATION: 97 % | DIASTOLIC BLOOD PRESSURE: 65 MMHG | BODY MASS INDEX: 23.32 KG/M2 | HEART RATE: 81 BPM | SYSTOLIC BLOOD PRESSURE: 109 MMHG | HEIGHT: 72 IN | WEIGHT: 172.18 LBS

## 2024-10-18 PROBLEM — E87.20 METABOLIC ACIDOSIS: Status: RESOLVED | Noted: 2023-09-18 | Resolved: 2024-10-18

## 2024-10-18 PROBLEM — R65.10 SIRS (SYSTEMIC INFLAMMATORY RESPONSE SYNDROME) (HCC): Status: RESOLVED | Noted: 2023-09-18 | Resolved: 2024-10-18

## 2024-10-18 PROBLEM — E72.20 HYPERAMMONEMIA (HCC): Status: RESOLVED | Noted: 2024-10-17 | Resolved: 2024-10-18

## 2024-10-18 LAB
AMMONIA PLAS-SCNC: 48 UMOL/L (ref 18–72)
ANION GAP SERPL CALCULATED.3IONS-SCNC: 7 MMOL/L (ref 4–13)
BUN SERPL-MCNC: 8 MG/DL (ref 5–25)
CALCIUM SERPL-MCNC: 8.7 MG/DL (ref 8.4–10.2)
CHLORIDE SERPL-SCNC: 108 MMOL/L (ref 96–108)
CO2 SERPL-SCNC: 24 MMOL/L (ref 21–32)
CREAT SERPL-MCNC: 0.71 MG/DL (ref 0.6–1.3)
ERYTHROCYTE [DISTWIDTH] IN BLOOD BY AUTOMATED COUNT: 11.9 % (ref 11.6–15.1)
GFR SERPL CREATININE-BSD FRML MDRD: 133 ML/MIN/1.73SQ M
GLUCOSE SERPL-MCNC: 98 MG/DL (ref 65–140)
HCT VFR BLD AUTO: 37.8 % (ref 36.5–49.3)
HGB BLD-MCNC: 12.3 G/DL (ref 12–17)
MCH RBC QN AUTO: 29.9 PG (ref 26.8–34.3)
MCHC RBC AUTO-ENTMCNC: 32.5 G/DL (ref 31.4–37.4)
MCV RBC AUTO: 92 FL (ref 82–98)
PLATELET # BLD AUTO: 229 THOUSANDS/UL (ref 149–390)
PMV BLD AUTO: 10.8 FL (ref 8.9–12.7)
POTASSIUM SERPL-SCNC: 3.6 MMOL/L (ref 3.5–5.3)
RBC # BLD AUTO: 4.11 MILLION/UL (ref 3.88–5.62)
SODIUM SERPL-SCNC: 139 MMOL/L (ref 135–147)
WBC # BLD AUTO: 12.5 THOUSAND/UL (ref 4.31–10.16)

## 2024-10-18 PROCEDURE — 82140 ASSAY OF AMMONIA: CPT | Performed by: PHYSICIAN ASSISTANT

## 2024-10-18 PROCEDURE — 80048 BASIC METABOLIC PNL TOTAL CA: CPT | Performed by: PHYSICIAN ASSISTANT

## 2024-10-18 PROCEDURE — 99239 HOSP IP/OBS DSCHRG MGMT >30: CPT

## 2024-10-18 PROCEDURE — 85027 COMPLETE CBC AUTOMATED: CPT | Performed by: PHYSICIAN ASSISTANT

## 2024-10-18 RX ADMIN — LEVETIRACETAM 500 MG: 100 INJECTION, SOLUTION INTRAVENOUS at 09:38

## 2024-10-18 RX ADMIN — SODIUM CHLORIDE, SODIUM GLUCONATE, SODIUM ACETATE, POTASSIUM CHLORIDE, MAGNESIUM CHLORIDE, SODIUM PHOSPHATE, DIBASIC, AND POTASSIUM PHOSPHATE 125 ML/HR: .53; .5; .37; .037; .03; .012; .00082 INJECTION, SOLUTION INTRAVENOUS at 04:29

## 2024-10-18 RX ADMIN — CHLORHEXIDINE GLUCONATE 15 ML: 1.2 RINSE ORAL at 09:38

## 2024-10-18 RX ADMIN — HEPARIN SODIUM 5000 UNITS: 5000 INJECTION, SOLUTION INTRAVENOUS; SUBCUTANEOUS at 06:57

## 2024-10-18 RX ADMIN — ZONISAMIDE 300 MG: 100 CAPSULE ORAL at 09:38

## 2024-10-18 NOTE — DISCHARGE SUMMARY
Discharge Summary - Hospitalist   Name: Tim Alanis  24 y.o. male I MRN: [id number]  Unit/Bed#: -01 I Date of Admission: 10/16/2024   Date of Service: 10/18/2024 I Hospital Day: 2     Assessment & Plan  Epileptic seizure, generalized (HCC)  Hx of seizures, patient admitted to ED for breakthrough seizures. Had seizures x2 then left AMA. Returned to ED after 3rd seizure  Initially requiring ICU admission now downgraded to [facility] level of care  On Keppra and Zonisamide at home, level checked on admission and subtherapeutic, concern for medication noncompliance  Received Keppra load 3G in ED, restarted home dose  Resume home dose Zonisamide  CT Head reveals: No acute intracranial abnormality   No additional seizure activity noted  Neurology consult recommends as follows:  No additional imaging at this time  Ok to discharge after 24hrs seizure free  Will follow-up with  outpatient  Metabolic acidosis (Resolved: 10/18/2024)  On admission, lactic 17 anion gap 27  Received aggressive IV hydration  Now resolved  SIRS (systemic inflammatory response syndrome) (HCC) (Resolved: 10/18/2024)  Met SIRS criteria on admission evident by tachypnea, leukocytosis   Low suspicion for bacteremia  Likely secondary to seizure activity  U/A without signs of infection  Blood cultures without growth at 24hrs  CXR No acute cardiopulmonary disease   Now resolved   Polysubstance abuse (HCC)  Patient with known history of polysubstance abuse  UDS +methamphetamines, THC  Case Management consult to assess with outpatient resources  PRN benzos for seizure, withdrawal symptoms  Patient considering outpatient rehab/detox  Hyperammonemia (HCC) (Resolved: 10/18/2024)  Lab Results   Component Value Date    AMMONIA 48 10/18/2024    AMMONIA 39 10/17/2024    AMMONIA 179 (H) 10/16/2024       Ammonia level on admission 179  LFTs normal  Patient noted to be taking Zonigran which can cause hyperammonemia   Ammonia now wn      Medical  Problems       Resolved Problems  Date Reviewed: 4/28/2024   None       Discharging Physician / Practitioner: SOLITARIO Damon  PCP: No primary care provider on file.  Admission Date:   Admission Orders (From admission, onward)       Ordered        10/16/24 2341  INPATIENT ADMISSION  Once                          Discharge Date: 10/18/24    Consultations During Hospital Stay:  Inpatient Neurology    Procedures Performed:   None    Significant Findings / Test Results:   CT Head: No acute intracranial abnormality     Incidental Findings:   None    Test Results Pending at Discharge (will require follow up):   None     Outpatient Tests Requested:  None    Complications:  None    Reason for Admission:    Chief Complaint   Patient presents with    Medical Problem     Pt arrived to front of ED in car with his mother. Pt pulled from car by this nurse, Meghna RN, Agustín VALLECILLO RN and Valarie MARROQUIN to stretcher and taken directly back to room 5 in ED. Pt actively seizing when nurses arrived at car. Pt maintaining airway. Security also assisted.          Hospital Course:   Tim Alanis Jr. is a 24 y.o. male patient who originally presented to the hospital on 10/16/2024 due to breakthrough seizure activity. Patient was admitted after 3 visits to ED for breakthrough seizure. He  signed out AMA during the first 2 episodes. After the 3rd seizure like episode which was witness by his mother he was amendable to admission. On admission he met criteria for metabolic acidosis with lactic level of 17.5. He received aggressive IV hydration. Patient was loaded on Keppra, received 2mg ativan then transferred to ICU for further management. Of note patient has history of polysubstance abuse. UDS + methamphetamines/+THC on admission. Keppra level found to be subtherapeutic. Concern for medication noncompliance. Home antiseizure  medications resumed. Was seen by Neurology who recommended discharged once patient 24hrs seizure free, no  additional testing/imaging at this time.Patient to follow-up outpatient once discharged. Pt was downgraded to m/s level of care once stable.  Case management following, was possible detox/rehab, refused, declined to meet with personnel. Patient discharged stable. No complaints to offer. All questions answered to patient's satisfaction. This is a brief summary of patient's stay. For additional details please refer to attached progress notes.     Hospital Course: No notes on file      Please see above list of diagnoses and related plan for additional information.     Condition at Discharge: good    Discharge Day Visit / Exam:   Subjective:  Patient seen and examined at the bedside. No complaints at this time. Eager to go home    Vitals: Blood Pressure: 109/65 (10/18/24 1527)  Pulse: 81 (10/18/24 1527)  Temperature: 99.2 °F (37.3 °C) (10/18/24 1527)  Temp Source: Temporal (10/18/24 1527)  Respirations: 18 (10/18/24 0733)  Height: 6' (182.9 cm) (10/17/24 0130)  Weight - Scale: 78.1 kg (172 lb 2.9 oz) (10/17/24 0130)  SpO2: 97 % (10/18/24 1527)  Physical Exam  Vitals and nursing note reviewed.   Constitutional:       General: He is not in acute distress.  HENT:      Head: Normocephalic.   Eyes:      Conjunctiva/sclera: Conjunctivae normal.   Cardiovascular:      Rate and Rhythm: Normal rate and regular rhythm.      Pulses: Normal pulses.      Heart sounds: Normal heart sounds.   Pulmonary:      Effort: Pulmonary effort is normal. No respiratory distress.      Breath sounds: Normal breath sounds. No wheezing.   Abdominal:      General: There is no distension.      Palpations: Abdomen is soft.      Tenderness: There is no abdominal tenderness.   Musculoskeletal:         General: No swelling.   Skin:     General: Skin is warm.      Capillary Refill: Capillary refill takes less than 2 seconds.   Neurological:      Mental Status: He is alert and oriented to person, place, and time.   Psychiatric:         Attention and  Perception: Attention normal.         Mood and Affect: Mood normal.         Behavior: Behavior normal.        Discussion with Family: Attempted to update  (mother) via phone. Left voicemail.     Discharge instructions/Information to patient and family:   See after visit summary for information provided to patient and family.      Provisions for Follow-Up Care:  See after visit summary for information related to follow-up care and any pertinent home health orders.      Mobility at time of Discharge:   Basic Mobility Inpatient Raw Score: 24  JH-HLM Goal: 8: Walk 250 feet or more  JH-HLM Achieved: 6: Walk 10 steps or more  HLM Goal NOT achieved. Continue to encourage mobility in post discharge setting.     Disposition:   Home    Planned Readmission: no    Discharge Medications:  See after visit summary for reconciled discharge medications provided to patient and/or family.      Administrative Statements   Discharge Statement:  I have spent a total time of 32 minutes in caring for this patient on the day of the visit/encounter. >30 minutes of time was spent on: Prognosis, Risks and benefits of tx options, Instructions for management, Patient and family education, Importance of tx compliance, Risk factor reductions, Impressions, Counseling / Coordination of care, Documenting in the medical record, Reviewing / ordering tests, medicine, procedures  , and Communicating with other healthcare professionals .    **Please Note: This note may have been constructed using a voice recognition system**

## 2024-10-18 NOTE — PLAN OF CARE
Problem: PAIN - ADULT  Goal: Verbalizes/displays adequate comfort level or baseline comfort level  Description: Interventions:  - Encourage patient to monitor pain and request assistance  - Assess pain using appropriate pain scale  - Administer analgesics based on type and severity of pain and evaluate response  - Implement non-pharmacological measures as appropriate and evaluate response  - Consider cultural and social influences on pain and pain management  - Notify physician/advanced practitioner if interventions unsuccessful or patient reports new pain  Outcome: Progressing     Problem: INFECTION - ADULT  Goal: Absence or prevention of progression during hospitalization  Description: INTERVENTIONS:  - Assess and monitor for signs and symptoms of infection  - Monitor lab/diagnostic results  - Monitor all insertion sites, i.e. indwelling lines, tubes, and drains  - Monitor endotracheal if appropriate and nasal secretions for changes in amount and color  - [place] appropriate cooling/warming therapies per order  - Administer medications as ordered  - Instruct and encourage patient and family to use good hand hygiene technique  - Identify and instruct in appropriate isolation precautions for identified infection/condition  Outcome: Progressing  Goal: Absence of fever/infection during neutropenic period  Description: INTERVENTIONS:  - Monitor WBC    Outcome: Progressing     Problem: DISCHARGE PLANNING  Goal: Discharge to home or other facility with appropriate resources  Description: INTERVENTIONS:  - Identify barriers to discharge w/patient and caregiver  - Arrange for needed discharge resources and transportation as appropriate  - Identify discharge learning needs (meds, wound care, etc.)  - Arrange for interpretive services to assist at discharge as needed  - Refer to Case Management Department for coordinating discharge planning if the patient needs post-hospital services based on physician/advanced  practitioner order or complex needs related to functional status, cognitive ability, or social support system  Outcome: Progressing     Problem: Knowledge Deficit  Goal: Patient/family/caregiver demonstrates understanding of disease process, treatment plan, medications, and discharge instructions  Description: Complete learning assessment and assess knowledge base.  Interventions:  - Provide teaching at level of understanding  - Provide teaching via preferred learning methods  Outcome: Progressing     Problem: NEUROSENSORY - ADULT  Goal: Achieves stable or improved neurological status  Description: INTERVENTIONS  - Monitor and report changes in neurological status  - Monitor vital signs such as temperature, blood pressure, glucose, and any other labs ordered   - Initiate measures to prevent increased intracranial pressure  - Monitor for seizure activity and implement precautions if appropriate      Outcome: Progressing  Goal: Remains free of injury related to seizures activity  Description: INTERVENTIONS  - Maintain airway, patient safety  and administer oxygen as ordered  - Monitor patient for seizure activity, document and report duration and description of seizure to physician/advanced practitioner  - If seizure occurs,  ensure patient safety during seizure  - Reorient patient post seizure  - Seizure pads on all 4 side rails  - Instruct patient/family to notify RN of any seizure activity including if an aura is experienced  - Instruct patient/family to call for assistance with activity based on nursing assessment  - Administer anti-seizure medications if ordered    Outcome: Progressing  Goal: Achieves maximal functionality and self care  Description: INTERVENTIONS  - Monitor swallowing and airway patency with patient fatigue and changes in neurological status  - Encourage and assist patient to increase activity and self care.   - Encourage visually impaired, hearing impaired and aphasic patients to use  assistive/communication devices  Outcome: Progressing

## 2024-10-18 NOTE — ASSESSMENT & PLAN NOTE
Met SIRS criteria on admission evident by tachypnea, leukocytosis   Low suspicion for bacteremia  Likely secondary to seizure activity  U/A without signs of infection  Blood cultures without growth at 24hrs  CXR No acute cardiopulmonary disease   Now resolved

## 2024-10-18 NOTE — PROGRESS NOTES
Referral received via email. Referred to [facility] [employer] Care Plan Committee on 10/17/24 to be reviewed for careplan.  No further action to be taken pending determination of case review.

## 2024-10-18 NOTE — PLAN OF CARE
Problem: PAIN - ADULT  Goal: Verbalizes/displays adequate comfort level or baseline comfort level  Description: Interventions:  - Encourage patient to monitor pain and request assistance  - Assess pain using appropriate pain scale  - Administer analgesics based on type and severity of pain and evaluate response  - Implement non-pharmacological measures as appropriate and evaluate response  - Consider cultural and social influences on pain and pain management  - Notify physician/advanced practitioner if interventions unsuccessful or patient reports new pain  Outcome: Progressing     Problem: INFECTION - ADULT  Goal: Absence or prevention of progression during hospitalization  Description: INTERVENTIONS:  - Assess and monitor for signs and symptoms of infection  - Monitor lab/diagnostic results  - Monitor all insertion sites, i.e. indwelling lines, tubes, and drains  - Monitor endotracheal if appropriate and nasal secretions for changes in amount and color  - [place] appropriate cooling/warming therapies per order  - Administer medications as ordered  - Instruct and encourage patient and family to use good hand hygiene technique  - Identify and instruct in appropriate isolation precautions for identified infection/condition  Outcome: Progressing  Goal: Absence of fever/infection during neutropenic period  Description: INTERVENTIONS:  - Monitor WBC    Outcome: Progressing     Problem: DISCHARGE PLANNING  Goal: Discharge to home or other facility with appropriate resources  Description: INTERVENTIONS:  - Identify barriers to discharge w/patient and caregiver  - Arrange for needed discharge resources and transportation as appropriate  - Identify discharge learning needs (meds, wound care, etc.)  - Arrange for interpretive services to assist at discharge as needed  - Refer to Case Management Department for coordinating discharge planning if the patient needs post-hospital services based on physician/advanced  practitioner order or complex needs related to functional status, cognitive ability, or social support system  Outcome: Progressing     Problem: Knowledge Deficit  Goal: Patient/family/caregiver demonstrates understanding of disease process, treatment plan, medications, and discharge instructions  Description: Complete learning assessment and assess knowledge base.  Interventions:  - Provide teaching at level of understanding  - Provide teaching via preferred learning methods  Outcome: Progressing     Problem: NEUROSENSORY - ADULT  Goal: Achieves stable or improved neurological status  Description: INTERVENTIONS  - Monitor and report changes in neurological status  - Monitor vital signs such as temperature, blood pressure, glucose, and any other labs ordered   - Initiate measures to prevent increased intracranial pressure  - Monitor for seizure activity and implement precautions if appropriate      Outcome: Progressing  Goal: Remains free of injury related to seizures activity  Description: INTERVENTIONS  - Maintain airway, patient safety  and administer oxygen as ordered  - Monitor patient for seizure activity, document and report duration and description of seizure to physician/advanced practitioner  - If seizure occurs,  ensure patient safety during seizure  - Reorient patient post seizure  - Seizure pads on all 4 side rails  - Instruct patient/family to notify RN of any seizure activity including if an aura is experienced  - Instruct patient/family to call for assistance with activity based on nursing assessment  - Administer anti-seizure medications if ordered    Outcome: Progressing  Goal: Achieves maximal functionality and self care  Description: INTERVENTIONS  - Monitor swallowing and airway patency with patient fatigue and changes in neurological status  - Encourage and assist patient to increase activity and self care.   - Encourage visually impaired, hearing impaired and aphasic patients to use  assistive/communication devices  Outcome: Progressing

## 2024-10-18 NOTE — DISCHARGE INSTR - AVS FIRST PAGE
Dear Tim Alanis Jr.,     It was our pleasure to care for you here at [facility]. For follow up as well as any medication refills, we recommend that you follow up with your primary care physician. Here are the most important instructions/ recommendations at discharge:     Notable Medication Adjustments -   None  Testing Required after Discharge -   None  Important follow up information -   Please follow-up with your PCP on 10/21  Follow-up with  Neurology, the office will call you for an appointment  Other Instructions -   Please review outpatient resources listed  for additional support  No driving until cleared by your Neurologist    Please review this entire after visit summary as additional general instructions including medication list, appointments, activity, diet, any pertinent wound care, and other additional recommendations from your care team that may be provided for you.      Sincerely,     SOLITARIO Damon

## 2024-10-18 NOTE — ASSESSMENT & PLAN NOTE
Lab Results   Component Value Date    AMMONIA 48 10/18/2024    AMMONIA 39 10/17/2024    AMMONIA 179 (H) 10/16/2024       Ammonia level on admission 179  LFTs normal  Patient noted to be taking Zonigran which can cause hyperammonemia   Ammonia now wnl

## 2024-10-18 NOTE — CASE MANAGEMENT
Case Management Discharge Planning Note    Patient name Tim Alanis Jr.  Location /-01 MRN [id number]  : 2000 Date 10/18/2024       Current Admission Date: 10/16/2024  Current Admission Diagnosis:Epileptic seizure, generalized (HCC)   Patient Active Problem List    Diagnosis Date Noted Date Diagnosed    Hyperammonemia (HCC) 10/17/2024     BALWINDER (acute kidney injury) (HCC) 10/07/2024     Polysubstance abuse (HCC) 10/07/2024     Breakthrough seizure (HCC) 2024     Epileptic seizure, generalized (HCC) 2023     Metabolic acidosis 2023     SIRS (systemic inflammatory response syndrome) ([facility]) 2023     Drug use 2023       LOS (days): 2  Geometric Mean LOS (GMLOS) (days):   Days to GMLOS:     OBJECTIVE:  Risk of Unplanned Readmission Score: 20.84         Current admission status: Inpatient   Preferred Pharmacy:   [facility] - [place] - [address]  [address]  [place] [address]  Phone: [phone] Fax: [phone]    [facility] [place], [place] - [address] [facility]  [address] [facility] [address]  Phone: [phone] Fax: [phone]    Primary Care Provider: No primary care provider on file.    Primary Insurance: [employer]  Secondary Insurance:     DISCHARGE DETAILS:     Did refer this patient to the high utilizer team, to review for an appropriate care plan.

## 2024-10-18 NOTE — UTILIZATION REVIEW
NOTIFICATION OF INPATIENT ADMISSION   AUTHORIZATION REQUEST   SERVICING FACILITY:   [place] [address]  Tax ID: [id number]  NPI: [id number] ATTENDING PROVIDER:  Attending Name and NPI#: Jose Antonio Martin Do [[id number]]  Address: [address] [place] [address]  Phone: [phone]   ADMISSION INFORMATION:  Place of Service: Inpatient [facility]  Place of Service Code: 21  Inpatient Admission Date/Time: 10/16/24 11:41 PM  Discharge Date/Time: No discharge date for patient encounter.  Admitting Diagnosis Code/Description:  Substance abuse (HCC) [F19.10]  Seizure (HCC) [R56.9]  Hyperammonemia (HCC) [E72.20]  History of noncompliance with medical treatment [Z91.199]  Polysubstance abuse (HCC) [F19.10]  Breakthrough seizure (HCC) [G40.919]  Medication reaction, initial encounter [T50.905A]  Known medical problems [Z78.9]     UTILIZATION REVIEW CONTACT:  Shila Valdes, Utilization   Network Utilization Review Department  Phone: [phone]  Fax [phone]  Email: [email or web address]  Contact for approvals/pending authorizations, clinical reviews, and discharge.     PHYSICIAN ADVISORY SERVICES:  Medical Necessity Denial & Vndb-gy-Ufaj Review  Phone: [phone]  Fax: [phone]  Email: [email or web address]     DISCHARGE SUPPORT TEAM:  For Patients Discharge Needs & Updates  Phone: [phone] opt. 2 Fax: [phone]  Email: [email or web address]

## 2024-10-18 NOTE — NURSING NOTE
Assumed patient care at 2315. Patient resting comfortably in bed with call bell in reach and alarms on.

## 2024-10-18 NOTE — ASSESSMENT & PLAN NOTE
Hx of seizures, patient admitted to ED for breakthrough seizures. Had seizures x2 then left AMA. Returned to ED after 3rd seizure  Initially requiring ICU admission now downgraded to [facility] level of care  On Keppra and Zonisamide at home, level checked on admission and subtherapeutic, concern for medication noncompliance  Received Keppra load 3G in ED, restarted home dose  Resume home dose Zonisamide  CT Head reveals: No acute intracranial abnormality   No additional seizure activity noted  Neurology consult recommends as follows:  No additional imaging at this time  Ok to discharge after 24hrs seizure free  Will follow-up with  outpatient

## 2024-10-18 NOTE — CASE MANAGEMENT
Case Management Discharge Planning Note    Patient name Tim Alanis Jr.  Location /-01 MRN [id number]  : 2000 Date 10/18/2024       Current Admission Date: 10/16/2024  Current Admission Diagnosis:Epileptic seizure, generalized (HCC)   Patient Active Problem List    Diagnosis Date Noted Date Diagnosed    Hyperammonemia (HCC) 10/17/2024     BALWINDER (acute kidney injury) (HCC) 10/07/2024     Polysubstance abuse (HCC) 10/07/2024     Breakthrough seizure (HCC) 2024     Epileptic seizure, generalized (HCC) 2023     Metabolic acidosis 2023     SIRS (systemic inflammatory response syndrome) ([facility]) 2023     Drug use 2023       LOS (days): 2  Geometric Mean LOS (GMLOS) (days):   Days to GMLOS:     OBJECTIVE:  Risk of Unplanned Readmission Score: 20.27         Current admission status: Inpatient   Preferred Pharmacy:   [facility] - [place] - [address]  [address]  [place] [address]  Phone: [phone] Fax: [phone]    [facility] [address] [place] [address] [address] [facility]  [address] [facility] [address]  Phone: [phone] Fax: [phone]    Primary Care Provider: No primary care provider on file.    Primary Insurance: [employer]  Secondary Insurance:     DISCHARGE DETAILS:     Spoke to patient:  CM shared with Tim, a post PCP appointment was made for him at the [facility] office.  Sukh is a bit disinterested at the moment.  Sukh said he is taking his anti seizures medications and is mad that every one keeps telling him he id noncompliant.  Discussed his Meth use and he is agreeable at this moment for a [facility]- referral was made.  Upon exiting the room, pt was calling his mother to pick him up.  CM will continue to follow.

## 2024-10-18 NOTE — ASSESSMENT & PLAN NOTE
Patient with known history of polysubstance abuse  UDS +methamphetamines, THC  Case Management consult to assess with outpatient resources  PRN benzos for seizure, withdrawal symptoms  Patient considering outpatient rehab/detox

## 2024-10-18 NOTE — CASE MANAGEMENT
Case Management Discharge Planning Note    Patient name Tim Alanis Jr.  Location /-01 MRN [id number]  : 2000 Date 10/18/2024       Current Admission Date: 10/16/2024  Current Admission Diagnosis:Epileptic seizure, generalized (HCC)   Patient Active Problem List    Diagnosis Date Noted Date Diagnosed    BALWINDER (acute kidney injury) (HCC) 10/07/2024     Polysubstance abuse (HCC) 10/07/2024     Breakthrough seizure (HCC) 2024     Epileptic seizure, generalized (HCC) 2023     Drug use 2023       LOS (days): 2  Geometric Mean LOS (GMLOS) (days):   Days to GMLOS:     OBJECTIVE:  Risk of Unplanned Readmission Score: 19.96         Current admission status: Inpatient   Preferred Pharmacy:   [facility] - [place] - [address]  [address]  [place] [address]  Phone: [phone] Fax: [phone]    [facility] [place] - [address] [facility]  [address] [facility] [address]  Phone: [phone] Fax: [phone]    Primary Care Provider: No primary care provider on file.    Primary Insurance: [employer]  Secondary Insurance:     DISCHARGE DETAILS:     Checked with Sukh, [facility] was not here to see him, recalled [facility] and they will be here in 30 minutes, nursing/provider are aware.

## 2024-10-18 NOTE — NURSING NOTE
DC instructions given to and explained to patient with verbal understanding. All belongings at side. PIVs removed.

## 2024-10-20 LAB
BACTERIA BLD CULT: NORMAL
BACTERIA BLD CULT: NORMAL

## 2024-10-21 LAB — ZONISAMIDE SERPL-MCNC: 3.5 UG/ML (ref 10–40)

## 2024-10-21 NOTE — UTILIZATION REVIEW
NOTIFICATION OF ADMISSION DISCHARGE   This is a Notification of Discharge from [facility]. Please be advised that this patient has been discharge from our facility. Below you will find the admission and discharge date and time including the patient’s disposition.   UTILIZATION REVIEW CONTACT:  Shila Valdes  Utilization   Network Utilization Review Department  Phone: [phone] x carefully listen to the prompts. All voicemails are confidential.  Email: [email or web address]     ADMISSION INFORMATION  PRESENTATION DATE: 10/16/2024  9:53 PM  OBERVATION ADMISSION DATE: N/A  INPATIENT ADMISSION DATE: 10/16/24 11:41 PM   DISCHARGE DATE: 10/18/2024  7:00 PM   DISPOSITION:Home/Self Care    Network Utilization Review Department  ATTENTION: Please call with any questions or concerns to [phone] and carefully listen to the prompts so that you are directed to the right person. All voicemails are confidential.   For Discharge needs, contact Care Management DC Support Team at [phone] opt. 2  Send all requests for admission clinical reviews, approved or denied determinations and any other requests to dedicated fax number below belonging to the campus where the patient is receiving treatment. List of dedicated fax numbers for the Facilities:  FACILITY NAME UR FAX NUMBER   ADMISSION DENIALS (Administrative/Medical Necessity) [phone]   DISCHARGE SUPPORT TEAM ([facility]) [phone]   PARENT CHILD HEALTH (Maternity/NICU/Pediatrics) [phone]   [facility] [phone]   [facility] [phone]   formerly [facility] [phone]   [facility] [phone]   [facility] [phone]   [facility] [phone]   [facility] [phone]   [facility]  [phone]   [facility] [phone]   [facility] [phone]   [facility] [phone]   [facility] [phone]

## 2024-10-22 ENCOUNTER — PATIENT OUTREACH (OUTPATIENT)
Dept: CASE MANAGEMENT | Facility: OTHER | Age: 24
End: 2024-10-22

## 2024-10-22 DIAGNOSIS — Z71.89 COORDINATION OF COMPLEX CARE: Primary | ICD-10-CM

## 2024-10-22 LAB
BACTERIA BLD CULT: NORMAL
BACTERIA BLD CULT: NORMAL

## 2024-10-22 NOTE — PROGRESS NOTES
Email received from High Utilizer Committee on 10/21/24.  Committee reviewed and determined a care plan is not appropriate at this time.   Patient is referred to complex care management as a Rising Utilizer.

## 2024-10-23 ENCOUNTER — PATIENT OUTREACH (OUTPATIENT)
Dept: CASE MANAGEMENT | Facility: OTHER | Age: 24
End: 2024-10-23

## 2024-10-23 NOTE — PROGRESS NOTES
OutPatient Complex Care Management Note:  Case identified as Rising Utilizer.  Chart review completed.  High risk score for Admission or ED visit 86;  7 ED visits and 3 unplanned hospital admission in past 6 months.    Patient was recently admitted to [facility] on 10/16/24 for generalized epileptic seizure.  Patient takes Keppra and Zonisamide as outpatient but was found to have subtherapeutic levels upon admission.  Patient was treated with loading dose of Keppra in the ED, and home medications doses continued throughout hospitalization.  Patient was cleared to discharge to home on keppr 1500 mg every twice daily and zonisamide 300 mg daily and with recommendation to follow up with PCP and Neurology.      There are concerns for medication non adherence in the outpatient setting    Patient has history of signing out AMA during ED visits    PMH includes: Epileptic seizures, polysubstance use disorder, Tobacco disorder, depression    Patient followed with Torsten CHAVIRA to establish as a new patient and saw IBAN on 10/21/24.  Patient was referred to psychiatry and to OP rehab programs.    Future Appointments:    Needs follow up appointment with Neurology.  Per chart review noted Neurology made attempt to contact patient yesterday to schedule appointment but were unable to connect with patient.      OPCM telephone outreach attempted.   No answer.  Unable to leave message as there was no voice mailbox set up.     Telephone call also made to patient's mother's phone number listed in chart.  No answer.   Left voicemail message with general contact information with name, title, call back phone number office hours when I am available and message encouraging return call to this CM.      Second outreach attempt scheduled.  IB reminder set.

## 2024-11-01 ENCOUNTER — PATIENT OUTREACH (OUTPATIENT)
Dept: CASE MANAGEMENT | Facility: OTHER | Age: 24
End: 2024-11-01

## 2024-11-01 NOTE — PROGRESS NOTES
Outpatient Care Management Note:  Second telephone outreach attempt made. No answer.  Unable to leave message as there was no voice mailbox set up.   Unable to reach letter is being sent to address listed in chart.  Future reminder set.

## 2024-11-01 NOTE — LETTER
Date: 11/01/24    Dear Tim Alanis Jr.,   My name is Evangelina Freeman and I am a registered nurse care manager working with  [facility] - Outpatient [facility] Management.     I have not been able to reach you and would like to set a time that I can talk with you over the phone.    My work is to help patients that have complex medical conditions get the care they need. This includes patients who may have been in the hospital or emergency room.  I work to get to know you, and help you in your care, your recovery, or assist you reach your personal health goals.  I also help identify barriers keeping you from getting the medical attention you deserve and I assist to connect you with community resources as needed.     Please call me at [phone] with any questions you may have. I look forward to speaking with you.    Sincerely,    Evangelina Freeman, RN    Evangelina Freeman, RN  Outpatient Care Manager    [phone]

## 2024-11-15 ENCOUNTER — PATIENT OUTREACH (OUTPATIENT)
Dept: CASE MANAGEMENT | Facility: OTHER | Age: 24
End: 2024-11-15

## 2024-11-15 NOTE — PROGRESS NOTES
OutPatient Care Management Note:  Attempts to outreach patient by phone and mail have been unsuccessful.   RU episode will be closed. OP CM Department Watch List updated. I have removed myself from the care team.

## 2024-11-28 ENCOUNTER — APPOINTMENT (EMERGENCY)
Dept: CT IMAGING | Facility: HOSPITAL | Age: 24
End: 2024-11-28
Payer: COMMERCIAL

## 2024-11-28 ENCOUNTER — HOSPITAL ENCOUNTER (INPATIENT)
Facility: HOSPITAL | Age: 24
LOS: 3 days | Discharge: HOME | DRG: 133 | End: 2024-12-01
Attending: PSYCHIATRY & NEUROLOGY | Admitting: PSYCHIATRY & NEUROLOGY
Payer: COMMERCIAL

## 2024-11-28 ENCOUNTER — HOSPITAL ENCOUNTER (EMERGENCY)
Facility: HOSPITAL | Age: 24
End: 2024-11-28
Attending: STUDENT IN AN ORGANIZED HEALTH CARE EDUCATION/TRAINING PROGRAM
Payer: COMMERCIAL

## 2024-11-28 ENCOUNTER — APPOINTMENT (INPATIENT)
Dept: NEUROLOGY | Facility: CLINIC | Age: 24
DRG: 133 | End: 2024-11-28
Payer: COMMERCIAL

## 2024-11-28 ENCOUNTER — APPOINTMENT (EMERGENCY)
Dept: RADIOLOGY | Facility: HOSPITAL | Age: 24
End: 2024-11-28
Payer: COMMERCIAL

## 2024-11-28 VITALS
OXYGEN SATURATION: 100 % | RESPIRATION RATE: 14 BRPM | DIASTOLIC BLOOD PRESSURE: 73 MMHG | HEART RATE: 64 BPM | SYSTOLIC BLOOD PRESSURE: 115 MMHG | TEMPERATURE: 96.8 F | BODY MASS INDEX: 23.5 KG/M2 | WEIGHT: 173.28 LBS

## 2024-11-28 DIAGNOSIS — E87.29 HIGH ANION GAP METABOLIC ACIDOSIS: ICD-10-CM

## 2024-11-28 DIAGNOSIS — G40.901 STATUS EPILEPTICUS (HCC): Primary | ICD-10-CM

## 2024-11-28 DIAGNOSIS — G40.309 EPILEPTIC SEIZURE, GENERALIZED (HCC): Primary | ICD-10-CM

## 2024-11-28 DIAGNOSIS — F15.10 METHAMPHETAMINE USE (HCC): ICD-10-CM

## 2024-11-28 DIAGNOSIS — J96.01 ACUTE RESPIRATORY FAILURE WITH HYPOXIA (HCC): ICD-10-CM

## 2024-11-28 DIAGNOSIS — N17.9 AKI (ACUTE KIDNEY INJURY) (HCC): ICD-10-CM

## 2024-11-28 DIAGNOSIS — G40.919 BREAKTHROUGH SEIZURE (HCC): ICD-10-CM

## 2024-11-28 DIAGNOSIS — E87.20 LACTIC ACIDOSIS: ICD-10-CM

## 2024-11-28 LAB
ALBUMIN SERPL BCG-MCNC: 5.3 G/DL (ref 3.5–5)
ALP SERPL-CCNC: 72 U/L (ref 34–104)
ALT SERPL W P-5'-P-CCNC: 14 U/L (ref 7–52)
AMMONIA PLAS-SCNC: 50 UMOL/L (ref 18–72)
AMMONIA PLAS-SCNC: 95 UMOL/L (ref 18–72)
AMPHETAMINES SERPL QL SCN: POSITIVE
ANION GAP SERPL CALCULATED.3IONS-SCNC: 31 MMOL/L (ref 4–13)
ANION GAP SERPL CALCULATED.3IONS-SCNC: 9 MMOL/L (ref 4–13)
APAP SERPL-MCNC: <2 UG/ML (ref 10–20)
AST SERPL W P-5'-P-CCNC: 18 U/L (ref 13–39)
BACTERIA UR QL AUTO: NORMAL /HPF
BARBITURATES UR QL: NEGATIVE
BASE EX.OXY STD BLDV CALC-SCNC: 66.6 % (ref 60–80)
BASE EX.OXY STD BLDV CALC-SCNC: 93.2 % (ref 60–80)
BASE EX.OXY STD BLDV CALC-SCNC: 94 % (ref 60–80)
BASE EXCESS BLDV CALC-SCNC: -1.6 MMOL/L
BASE EXCESS BLDV CALC-SCNC: -15 MMOL/L
BASE EXCESS BLDV CALC-SCNC: -3.6 MMOL/L
BASOPHILS # BLD AUTO: 0.05 THOUSANDS/ΜL (ref 0–0.1)
BASOPHILS # BLD MANUAL: 0 THOUSAND/UL (ref 0–0.1)
BASOPHILS NFR BLD AUTO: 0 % (ref 0–1)
BASOPHILS NFR MAR MANUAL: 0 % (ref 0–1)
BENZODIAZ UR QL: POSITIVE
BILIRUB DIRECT SERPL-MCNC: 0.11 MG/DL (ref 0–0.2)
BILIRUB SERPL-MCNC: 0.51 MG/DL (ref 0.2–1)
BILIRUB UR QL STRIP: NEGATIVE
BODY TEMPERATURE: 97.3 DEGREES FEHRENHEIT
BUN SERPL-MCNC: 12 MG/DL (ref 5–25)
BUN SERPL-MCNC: 13 MG/DL (ref 5–25)
CALCIUM SERPL-MCNC: 10.4 MG/DL (ref 8.4–10.2)
CALCIUM SERPL-MCNC: 8.3 MG/DL (ref 8.4–10.2)
CARDIAC TROPONIN I PNL SERPL HS: 4 NG/L (ref 8–18)
CHLORIDE SERPL-SCNC: 100 MMOL/L (ref 96–108)
CHLORIDE SERPL-SCNC: 105 MMOL/L (ref 96–108)
CK SERPL-CCNC: 141 U/L (ref 39–308)
CLARITY UR: CLEAR
CO2 SERPL-SCNC: 24 MMOL/L (ref 21–32)
CO2 SERPL-SCNC: 7 MMOL/L (ref 21–32)
COCAINE UR QL: NEGATIVE
COLOR UR: YELLOW
CREAT SERPL-MCNC: 1.16 MG/DL (ref 0.6–1.3)
CREAT SERPL-MCNC: 1.27 MG/DL (ref 0.6–1.3)
EOSINOPHIL # BLD AUTO: 0.16 THOUSAND/ΜL (ref 0–0.61)
EOSINOPHIL # BLD MANUAL: 0.75 THOUSAND/UL (ref 0–0.4)
EOSINOPHIL NFR BLD AUTO: 1 % (ref 0–6)
EOSINOPHIL NFR BLD MANUAL: 4 % (ref 0–6)
ERYTHROCYTE [DISTWIDTH] IN BLOOD BY AUTOMATED COUNT: 12.3 % (ref 11.6–15.1)
ERYTHROCYTE [DISTWIDTH] IN BLOOD BY AUTOMATED COUNT: 12.6 % (ref 11.6–15.1)
ETHANOL SERPL-MCNC: <10 MG/DL
FENTANYL UR QL SCN: NEGATIVE
FLUAV RNA RESP QL NAA+PROBE: NEGATIVE
FLUBV RNA RESP QL NAA+PROBE: NEGATIVE
GFR SERPL CREATININE-BSD FRML MDRD: 79 ML/MIN/1.73SQ M
GFR SERPL CREATININE-BSD FRML MDRD: 88 ML/MIN/1.73SQ M
GLUCOSE SERPL-MCNC: 139 MG/DL (ref 65–140)
GLUCOSE SERPL-MCNC: 97 MG/DL (ref 65–140)
GLUCOSE UR STRIP-MCNC: NEGATIVE MG/DL
HCO3 BLDV-SCNC: 12.4 MMOL/L (ref 24–30)
HCO3 BLDV-SCNC: 23.4 MMOL/L (ref 24–30)
HCO3 BLDV-SCNC: 24.4 MMOL/L (ref 24–30)
HCT VFR BLD AUTO: 38 % (ref 36.5–49.3)
HCT VFR BLD AUTO: 50 % (ref 36.5–49.3)
HGB BLD-MCNC: 12.7 G/DL (ref 12–17)
HGB BLD-MCNC: 15.4 G/DL (ref 12–17)
HGB UR QL STRIP.AUTO: ABNORMAL
HOROWITZ INDEX BLDA+IHG-RTO: 40 MM[HG]
HYDROCODONE UR QL SCN: NEGATIVE
IMM GRANULOCYTES # BLD AUTO: 0.09 THOUSAND/UL (ref 0–0.2)
IMM GRANULOCYTES NFR BLD AUTO: 1 % (ref 0–2)
INR PPP: 1.13 (ref 0.85–1.19)
KETONES UR STRIP-MCNC: NEGATIVE MG/DL
LACTATE SERPL-SCNC: 1.2 MMOL/L (ref 0.5–2)
LACTATE SERPL-SCNC: 17 MMOL/L (ref 0.5–2)
LEUKOCYTE ESTERASE UR QL STRIP: NEGATIVE
LEVETIRACETAM SERPL-MCNC: 38.8 UG/ML (ref 12–46)
LG PLATELETS BLD QL SMEAR: PRESENT
LYMPHOCYTES # BLD AUTO: 2.26 THOUSANDS/ΜL (ref 0.6–4.47)
LYMPHOCYTES # BLD AUTO: 35 % (ref 14–44)
LYMPHOCYTES # BLD AUTO: 6.79 THOUSAND/UL (ref 0.6–4.47)
LYMPHOCYTES NFR BLD AUTO: 14 % (ref 14–44)
MAGNESIUM SERPL-MCNC: 2.6 MG/DL (ref 1.9–2.7)
MCH RBC QN AUTO: 30.3 PG (ref 26.8–34.3)
MCH RBC QN AUTO: 30.6 PG (ref 26.8–34.3)
MCHC RBC AUTO-ENTMCNC: 30.8 G/DL (ref 31.4–37.4)
MCHC RBC AUTO-ENTMCNC: 33.4 G/DL (ref 31.4–37.4)
MCV RBC AUTO: 92 FL (ref 82–98)
MCV RBC AUTO: 98 FL (ref 82–98)
METHADONE UR QL: NEGATIVE
MONOCYTES # BLD AUTO: 1.7 THOUSAND/UL (ref 0–1.22)
MONOCYTES # BLD AUTO: 1.75 THOUSAND/ΜL (ref 0.17–1.22)
MONOCYTES NFR BLD AUTO: 11 % (ref 4–12)
MONOCYTES NFR BLD: 9 % (ref 4–12)
NEUTROPHILS # BLD AUTO: 11.61 THOUSANDS/ΜL (ref 1.85–7.62)
NEUTROPHILS # BLD MANUAL: 9.62 THOUSAND/UL (ref 1.85–7.62)
NEUTS SEG NFR BLD AUTO: 51 % (ref 43–75)
NEUTS SEG NFR BLD AUTO: 73 % (ref 43–75)
NITRITE UR QL STRIP: NEGATIVE
NON-SQ EPI CELLS URNS QL MICRO: NORMAL /HPF
NRBC BLD AUTO-RTO: 0 /100 WBCS
O2 CT BLDV-SCNC: 13.3 ML/DL
O2 CT BLDV-SCNC: 18.1 ML/DL
O2 CT BLDV-SCNC: 20.6 ML/DL
OPIATES UR QL SCN: NEGATIVE
OTHER STN SPEC: NORMAL
OXYCODONE+OXYMORPHONE UR QL SCN: NEGATIVE
PCO2 BLD: 44.9 MM HG (ref 42–50)
PCO2 BLDV: 34.6 MM HG (ref 42–50)
PCO2 BLDV: 46.3 MM HG (ref 42–50)
PCO2 BLDV: 49.7 MM HG (ref 42–50)
PCP UR QL: NEGATIVE
PEEP RESPIRATORY: 6 CM[H2O]
PH BLD: 7.35 [PH] (ref 7.3–7.4)
PH BLDV: 7.17 [PH] (ref 7.3–7.4)
PH BLDV: 7.29 [PH] (ref 7.3–7.4)
PH BLDV: 7.34 [PH] (ref 7.3–7.4)
PH UR STRIP.AUTO: 5.5 [PH]
PLATELET # BLD AUTO: 199 THOUSANDS/UL (ref 149–390)
PLATELET # BLD AUTO: 295 THOUSANDS/UL (ref 149–390)
PLATELET BLD QL SMEAR: ADEQUATE
PMV BLD AUTO: 10.9 FL (ref 8.9–12.7)
PMV BLD AUTO: 11.4 FL (ref 8.9–12.7)
PO2 BLDV: 125.8 MM HG (ref 35–45)
PO2 BLDV: 38.6 MM HG (ref 35–45)
PO2 BLDV: 77.6 MM HG (ref 35–45)
PO2 VENOUS TEMP CORRECTED: 74.1 MM HG (ref 35–45)
POTASSIUM SERPL-SCNC: 3.8 MMOL/L (ref 3.5–5.3)
POTASSIUM SERPL-SCNC: 4 MMOL/L (ref 3.5–5.3)
PROT SERPL-MCNC: 7.6 G/DL (ref 6.4–8.4)
PROT UR STRIP-MCNC: NEGATIVE MG/DL
PROTHROMBIN TIME: 14.9 SECONDS (ref 12.3–15)
RBC # BLD AUTO: 4.15 MILLION/UL (ref 3.88–5.62)
RBC # BLD AUTO: 5.08 MILLION/UL (ref 3.88–5.62)
RBC #/AREA URNS AUTO: NORMAL /HPF
RBC MORPH BLD: NORMAL
RSV RNA RESP QL NAA+PROBE: NEGATIVE
SALICYLATES SERPL-MCNC: <5 MG/DL (ref 3–20)
SARS-COV-2 RNA RESP QL NAA+PROBE: NEGATIVE
SODIUM SERPL-SCNC: 138 MMOL/L (ref 135–147)
SODIUM SERPL-SCNC: 138 MMOL/L (ref 135–147)
SP GR UR STRIP.AUTO: >=1.03 (ref 1–1.03)
THC UR QL: POSITIVE
TSH SERPL DL<=0.05 MIU/L-ACNC: 1.06 UIU/ML (ref 0.45–4.5)
UROBILINOGEN UR QL STRIP.AUTO: 0.2 E.U./DL
VARIANT LYMPHS # BLD AUTO: 1 %
VENT AC: 14
VENT- AC: AC
VT SETTING VENT: 450 ML
WBC # BLD AUTO: 15.92 THOUSAND/UL (ref 4.31–10.16)
WBC # BLD AUTO: 18.87 THOUSAND/UL (ref 4.31–10.16)
WBC #/AREA URNS AUTO: NORMAL /HPF

## 2024-11-28 PROCEDURE — 95715 VEEG EA 12-26HR INTMT MNTR: CPT

## 2024-11-28 PROCEDURE — 94760 N-INVAS EAR/PLS OXIMETRY 1: CPT

## 2024-11-28 PROCEDURE — 82140 ASSAY OF AMMONIA: CPT

## 2024-11-28 PROCEDURE — 80203 DRUG SCREEN QUANT ZONISAMIDE: CPT | Performed by: STUDENT IN AN ORGANIZED HEALTH CARE EDUCATION/TRAINING PROGRAM

## 2024-11-28 PROCEDURE — 82077 ASSAY SPEC XCP UR&BREATH IA: CPT | Performed by: STUDENT IN AN ORGANIZED HEALTH CARE EDUCATION/TRAINING PROGRAM

## 2024-11-28 PROCEDURE — 96366 THER/PROPH/DIAG IV INF ADDON: CPT

## 2024-11-28 PROCEDURE — 82805 BLOOD GASES W/O2 SATURATION: CPT

## 2024-11-28 PROCEDURE — 84443 ASSAY THYROID STIM HORMONE: CPT | Performed by: NURSE PRACTITIONER

## 2024-11-28 PROCEDURE — 71250 CT THORAX DX C-: CPT

## 2024-11-28 PROCEDURE — 83605 ASSAY OF LACTIC ACID: CPT | Performed by: STUDENT IN AN ORGANIZED HEALTH CARE EDUCATION/TRAINING PROGRAM

## 2024-11-28 PROCEDURE — 99291 CRITICAL CARE FIRST HOUR: CPT | Performed by: STUDENT IN AN ORGANIZED HEALTH CARE EDUCATION/TRAINING PROGRAM

## 2024-11-28 PROCEDURE — 85007 BL SMEAR W/DIFF WBC COUNT: CPT | Performed by: STUDENT IN AN ORGANIZED HEALTH CARE EDUCATION/TRAINING PROGRAM

## 2024-11-28 PROCEDURE — 99291 CRITICAL CARE FIRST HOUR: CPT | Performed by: PSYCHIATRY & NEUROLOGY

## 2024-11-28 PROCEDURE — 0241U HB NFCT DS VIR RESP RNA 4 TRGT: CPT | Performed by: PSYCHIATRY & NEUROLOGY

## 2024-11-28 PROCEDURE — 99291 CRITICAL CARE FIRST HOUR: CPT

## 2024-11-28 PROCEDURE — 5A1935Z RESPIRATORY VENTILATION, LESS THAN 24 CONSECUTIVE HOURS: ICD-10-PCS | Performed by: PSYCHIATRY & NEUROLOGY

## 2024-11-28 PROCEDURE — 94002 VENT MGMT INPAT INIT DAY: CPT

## 2024-11-28 PROCEDURE — 82805 BLOOD GASES W/O2 SATURATION: CPT | Performed by: STUDENT IN AN ORGANIZED HEALTH CARE EDUCATION/TRAINING PROGRAM

## 2024-11-28 PROCEDURE — 85025 COMPLETE CBC W/AUTO DIFF WBC: CPT

## 2024-11-28 PROCEDURE — 80307 DRUG TEST PRSMV CHEM ANLYZR: CPT | Performed by: STUDENT IN AN ORGANIZED HEALTH CARE EDUCATION/TRAINING PROGRAM

## 2024-11-28 PROCEDURE — 72125 CT NECK SPINE W/O DYE: CPT

## 2024-11-28 PROCEDURE — 93005 ELECTROCARDIOGRAM TRACING: CPT

## 2024-11-28 PROCEDURE — 74176 CT ABD & PELVIS W/O CONTRAST: CPT

## 2024-11-28 PROCEDURE — 82550 ASSAY OF CK (CPK): CPT | Performed by: STUDENT IN AN ORGANIZED HEALTH CARE EDUCATION/TRAINING PROGRAM

## 2024-11-28 PROCEDURE — 83735 ASSAY OF MAGNESIUM: CPT | Performed by: STUDENT IN AN ORGANIZED HEALTH CARE EDUCATION/TRAINING PROGRAM

## 2024-11-28 PROCEDURE — 85027 COMPLETE CBC AUTOMATED: CPT | Performed by: STUDENT IN AN ORGANIZED HEALTH CARE EDUCATION/TRAINING PROGRAM

## 2024-11-28 PROCEDURE — 80179 DRUG ASSAY SALICYLATE: CPT | Performed by: STUDENT IN AN ORGANIZED HEALTH CARE EDUCATION/TRAINING PROGRAM

## 2024-11-28 PROCEDURE — 80048 BASIC METABOLIC PNL TOTAL CA: CPT

## 2024-11-28 PROCEDURE — 31500 INSERT EMERGENCY AIRWAY: CPT | Performed by: STUDENT IN AN ORGANIZED HEALTH CARE EDUCATION/TRAINING PROGRAM

## 2024-11-28 PROCEDURE — 81001 URINALYSIS AUTO W/SCOPE: CPT | Performed by: STUDENT IN AN ORGANIZED HEALTH CARE EDUCATION/TRAINING PROGRAM

## 2024-11-28 PROCEDURE — 95700 EEG CONT REC W/VID EEG TECH: CPT

## 2024-11-28 PROCEDURE — 80048 BASIC METABOLIC PNL TOTAL CA: CPT | Performed by: STUDENT IN AN ORGANIZED HEALTH CARE EDUCATION/TRAINING PROGRAM

## 2024-11-28 PROCEDURE — 80143 DRUG ASSAY ACETAMINOPHEN: CPT | Performed by: STUDENT IN AN ORGANIZED HEALTH CARE EDUCATION/TRAINING PROGRAM

## 2024-11-28 PROCEDURE — 4A10X4Z MONITORING OF CENTRAL NERVOUS ELECTRICAL ACTIVITY, EXTERNAL APPROACH: ICD-10-PCS | Performed by: PSYCHIATRY & NEUROLOGY

## 2024-11-28 PROCEDURE — 82140 ASSAY OF AMMONIA: CPT | Performed by: STUDENT IN AN ORGANIZED HEALTH CARE EDUCATION/TRAINING PROGRAM

## 2024-11-28 PROCEDURE — 94002 VENT MGMT INPAT INIT DAY: CPT | Performed by: SOCIAL WORKER

## 2024-11-28 PROCEDURE — 94760 N-INVAS EAR/PLS OXIMETRY 1: CPT | Performed by: SOCIAL WORKER

## 2024-11-28 PROCEDURE — 36415 COLL VENOUS BLD VENIPUNCTURE: CPT | Performed by: STUDENT IN AN ORGANIZED HEALTH CARE EDUCATION/TRAINING PROGRAM

## 2024-11-28 PROCEDURE — 80076 HEPATIC FUNCTION PANEL: CPT | Performed by: STUDENT IN AN ORGANIZED HEALTH CARE EDUCATION/TRAINING PROGRAM

## 2024-11-28 PROCEDURE — 83520 IMMUNOASSAY QUANT NOS NONAB: CPT

## 2024-11-28 PROCEDURE — 85610 PROTHROMBIN TIME: CPT | Performed by: STUDENT IN AN ORGANIZED HEALTH CARE EDUCATION/TRAINING PROGRAM

## 2024-11-28 PROCEDURE — 70450 CT HEAD/BRAIN W/O DYE: CPT

## 2024-11-28 PROCEDURE — NC001 PR NO CHARGE: Performed by: NURSE PRACTITIONER

## 2024-11-28 PROCEDURE — 84484 ASSAY OF TROPONIN QUANT: CPT | Performed by: STUDENT IN AN ORGANIZED HEALTH CARE EDUCATION/TRAINING PROGRAM

## 2024-11-28 PROCEDURE — 96365 THER/PROPH/DIAG IV INF INIT: CPT

## 2024-11-28 PROCEDURE — 96375 TX/PRO/DX INJ NEW DRUG ADDON: CPT

## 2024-11-28 PROCEDURE — 0BH17EZ INSERTION OF ENDOTRACHEAL AIRWAY INTO TRACHEA, VIA NATURAL OR ARTIFICIAL OPENING: ICD-10-PCS | Performed by: PSYCHIATRY & NEUROLOGY

## 2024-11-28 RX ORDER — SODIUM CHLORIDE, SODIUM GLUCONATE, SODIUM ACETATE, POTASSIUM CHLORIDE, MAGNESIUM CHLORIDE, SODIUM PHOSPHATE, DIBASIC, AND POTASSIUM PHOSPHATE .53; .5; .37; .037; .03; .012; .00082 G/100ML; G/100ML; G/100ML; G/100ML; G/100ML; G/100ML; G/100ML
125 INJECTION, SOLUTION INTRAVENOUS CONTINUOUS
Status: DISCONTINUED | OUTPATIENT
Start: 2024-11-28 | End: 2024-11-28 | Stop reason: HOSPADM

## 2024-11-28 RX ORDER — CHLORHEXIDINE GLUCONATE ORAL RINSE 1.2 MG/ML
15 SOLUTION DENTAL EVERY 12 HOURS SCHEDULED
Status: DISCONTINUED | OUTPATIENT
Start: 2024-11-28 | End: 2024-12-01 | Stop reason: HOSPADM

## 2024-11-28 RX ORDER — MIDAZOLAM HYDROCHLORIDE 2 MG/2ML
2 INJECTION, SOLUTION INTRAMUSCULAR; INTRAVENOUS ONCE
Status: COMPLETED | OUTPATIENT
Start: 2024-11-28 | End: 2024-11-28

## 2024-11-28 RX ORDER — POLYETHYLENE GLYCOL 3350 17 G/17G
17 POWDER, FOR SOLUTION ORAL DAILY
Status: DISCONTINUED | OUTPATIENT
Start: 2024-11-29 | End: 2024-12-01 | Stop reason: HOSPADM

## 2024-11-28 RX ORDER — FENTANYL CITRATE-0.9 % NACL/PF 10 MCG/ML
100 PLASTIC BAG, INJECTION (ML) INTRAVENOUS CONTINUOUS
Status: DISCONTINUED | OUTPATIENT
Start: 2024-11-28 | End: 2024-11-28

## 2024-11-28 RX ORDER — PROPOFOL 10 MG/ML
10 INJECTION, EMULSION INTRAVENOUS
Status: DISCONTINUED | OUTPATIENT
Start: 2024-11-28 | End: 2024-11-28 | Stop reason: HOSPADM

## 2024-11-28 RX ORDER — ROCURONIUM BROMIDE 10 MG/ML
100 INJECTION, SOLUTION INTRAVENOUS ONCE
Status: COMPLETED | OUTPATIENT
Start: 2024-11-28 | End: 2024-11-28

## 2024-11-28 RX ORDER — PROPOFOL 10 MG/ML
100 INJECTION, EMULSION INTRAVENOUS ONCE
Status: COMPLETED | OUTPATIENT
Start: 2024-11-28 | End: 2024-11-28

## 2024-11-28 RX ORDER — DEXMEDETOMIDINE HYDROCHLORIDE 4 UG/ML
.1-.7 INJECTION, SOLUTION INTRAVENOUS
Status: DISCONTINUED | OUTPATIENT
Start: 2024-11-28 | End: 2024-11-28

## 2024-11-28 RX ORDER — SODIUM CHLORIDE, SODIUM GLUCONATE, SODIUM ACETATE, POTASSIUM CHLORIDE, MAGNESIUM CHLORIDE, SODIUM PHOSPHATE, DIBASIC, AND POTASSIUM PHOSPHATE .53; .5; .37; .037; .03; .012; .00082 G/100ML; G/100ML; G/100ML; G/100ML; G/100ML; G/100ML; G/100ML
75 INJECTION, SOLUTION INTRAVENOUS CONTINUOUS
Status: DISCONTINUED | OUTPATIENT
Start: 2024-11-28 | End: 2024-11-28

## 2024-11-28 RX ORDER — SODIUM CHLORIDE, SODIUM GLUCONATE, SODIUM ACETATE, POTASSIUM CHLORIDE, MAGNESIUM CHLORIDE, SODIUM PHOSPHATE, DIBASIC, AND POTASSIUM PHOSPHATE .53; .5; .37; .037; .03; .012; .00082 G/100ML; G/100ML; G/100ML; G/100ML; G/100ML; G/100ML; G/100ML
1000 INJECTION, SOLUTION INTRAVENOUS ONCE
Status: COMPLETED | OUTPATIENT
Start: 2024-11-28 | End: 2024-11-28

## 2024-11-28 RX ORDER — LEVETIRACETAM 500 MG/5ML
1500 INJECTION, SOLUTION, CONCENTRATE INTRAVENOUS EVERY 12 HOURS SCHEDULED
Status: DISCONTINUED | OUTPATIENT
Start: 2024-11-28 | End: 2024-12-01 | Stop reason: HOSPADM

## 2024-11-28 RX ORDER — FENTANYL CITRATE-0.9 % NACL/PF 10 MCG/ML
75 PLASTIC BAG, INJECTION (ML) INTRAVENOUS CONTINUOUS
Status: DISCONTINUED | OUTPATIENT
Start: 2024-11-28 | End: 2024-11-28 | Stop reason: HOSPADM

## 2024-11-28 RX ORDER — DEXMEDETOMIDINE HYDROCHLORIDE 4 UG/ML
.1-.7 INJECTION, SOLUTION INTRAVENOUS
Status: DISCONTINUED | OUTPATIENT
Start: 2024-11-28 | End: 2024-11-28 | Stop reason: HOSPADM

## 2024-11-28 RX ORDER — PROPOFOL 10 MG/ML
150 INJECTION, EMULSION INTRAVENOUS ONCE
Status: COMPLETED | OUTPATIENT
Start: 2024-11-28 | End: 2024-11-28

## 2024-11-28 RX ORDER — MIDAZOLAM HYDROCHLORIDE 2 MG/2ML
INJECTION, SOLUTION INTRAMUSCULAR; INTRAVENOUS
Status: COMPLETED
Start: 2024-11-28 | End: 2024-11-28

## 2024-11-28 RX ORDER — ZONISAMIDE 100 MG/1
300 CAPSULE ORAL DAILY
Status: DISCONTINUED | OUTPATIENT
Start: 2024-11-29 | End: 2024-12-01 | Stop reason: HOSPADM

## 2024-11-28 RX ORDER — BISACODYL 10 MG
10 SUPPOSITORY, RECTAL RECTAL DAILY PRN
Status: DISCONTINUED | OUTPATIENT
Start: 2024-11-28 | End: 2024-12-01 | Stop reason: HOSPADM

## 2024-11-28 RX ORDER — LEVETIRACETAM 500 MG/5ML
3000 INJECTION, SOLUTION, CONCENTRATE INTRAVENOUS ONCE
Status: COMPLETED | OUTPATIENT
Start: 2024-11-28 | End: 2024-11-28

## 2024-11-28 RX ORDER — MIDAZOLAM HYDROCHLORIDE 2 MG/2ML
5 INJECTION, SOLUTION INTRAMUSCULAR; INTRAVENOUS ONCE
Status: COMPLETED | OUTPATIENT
Start: 2024-11-28 | End: 2024-11-28

## 2024-11-28 RX ORDER — PROPOFOL 10 MG/ML
5-50 INJECTION, EMULSION INTRAVENOUS
Status: DISCONTINUED | OUTPATIENT
Start: 2024-11-28 | End: 2024-11-28

## 2024-11-28 RX ADMIN — PROPOFOL 150 MG: 10 INJECTION, EMULSION INTRAVENOUS at 12:40

## 2024-11-28 RX ADMIN — PROPOFOL 150 MG: 10 INJECTION, EMULSION INTRAVENOUS at 13:53

## 2024-11-28 RX ADMIN — Medication 100 MCG/HR: at 21:12

## 2024-11-28 RX ADMIN — PROPOFOL 100 MG: 10 INJECTION, EMULSION INTRAVENOUS at 10:40

## 2024-11-28 RX ADMIN — PROPOFOL 50 MCG/KG/MIN: 10 INJECTION, EMULSION INTRAVENOUS at 12:54

## 2024-11-28 RX ADMIN — CHLORHEXIDINE GLUCONATE 0.12% ORAL RINSE 15 ML: 1.2 LIQUID ORAL at 20:22

## 2024-11-28 RX ADMIN — MIDAZOLAM 2 MG: 1 INJECTION INTRAMUSCULAR; INTRAVENOUS at 10:36

## 2024-11-28 RX ADMIN — MIDAZOLAM 5 MG: 1 INJECTION INTRAMUSCULAR; INTRAVENOUS at 14:36

## 2024-11-28 RX ADMIN — SODIUM CHLORIDE, SODIUM GLUCONATE, SODIUM ACETATE, POTASSIUM CHLORIDE, MAGNESIUM CHLORIDE, SODIUM PHOSPHATE, DIBASIC, AND POTASSIUM PHOSPHATE 1000 ML: .53; .5; .37; .037; .03; .012; .00082 INJECTION, SOLUTION INTRAVENOUS at 11:29

## 2024-11-28 RX ADMIN — LEVETIRACETAM 1500 MG: 100 INJECTION, SOLUTION INTRAVENOUS at 20:22

## 2024-11-28 RX ADMIN — PROPOFOL 50 MCG/KG/MIN: 10 INJECTION, EMULSION INTRAVENOUS at 17:00

## 2024-11-28 RX ADMIN — PROPOFOL 100 MG: 10 INJECTION, EMULSION INTRAVENOUS at 11:55

## 2024-11-28 RX ADMIN — PROPOFOL 10 MCG/KG/MIN: 10 INJECTION, EMULSION INTRAVENOUS at 10:43

## 2024-11-28 RX ADMIN — ROCURONIUM BROMIDE 100 MG: 10 INJECTION, SOLUTION INTRAVENOUS at 10:40

## 2024-11-28 RX ADMIN — DEXMEDETOMIDINE HYDROCHLORIDE 0.4 MCG/KG/HR: 400 INJECTION INTRAVENOUS at 17:01

## 2024-11-28 RX ADMIN — PROPOFOL 100 MG: 10 INJECTION, EMULSION INTRAVENOUS at 12:04

## 2024-11-28 RX ADMIN — DEXMEDETOMIDINE HYDROCHLORIDE 0.5 MCG/KG/HR: 400 INJECTION INTRAVENOUS at 14:05

## 2024-11-28 RX ADMIN — SODIUM CHLORIDE, SODIUM GLUCONATE, SODIUM ACETATE, POTASSIUM CHLORIDE, MAGNESIUM CHLORIDE, SODIUM PHOSPHATE, DIBASIC, AND POTASSIUM PHOSPHATE 75 ML/HR: .53; .5; .37; .037; .03; .012; .00082 INJECTION, SOLUTION INTRAVENOUS at 16:21

## 2024-11-28 RX ADMIN — DEXMEDETOMIDINE HYDROCHLORIDE 0.3 MCG/KG/HR: 400 INJECTION INTRAVENOUS at 21:13

## 2024-11-28 RX ADMIN — FENTANYL CITRATE 75 MCG/HR: 0.05 INJECTION, SOLUTION INTRAMUSCULAR; INTRAVENOUS at 11:12

## 2024-11-28 RX ADMIN — MIDAZOLAM HYDROCHLORIDE 5 MG: 2 INJECTION, SOLUTION INTRAMUSCULAR; INTRAVENOUS at 14:36

## 2024-11-28 RX ADMIN — LEVETIRACETAM 3000 MG: 100 INJECTION, SOLUTION INTRAVENOUS at 11:13

## 2024-11-28 RX ADMIN — SODIUM CHLORIDE, SODIUM GLUCONATE, SODIUM ACETATE, POTASSIUM CHLORIDE, MAGNESIUM CHLORIDE, SODIUM PHOSPHATE, DIBASIC, AND POTASSIUM PHOSPHATE 125 ML/HR: .53; .5; .37; .037; .03; .012; .00082 INJECTION, SOLUTION INTRAVENOUS at 12:42

## 2024-11-28 RX ADMIN — Medication 100 MCG/HR: at 17:01

## 2024-11-28 RX ADMIN — MIDAZOLAM HYDROCHLORIDE 2 MG: 2 INJECTION, SOLUTION INTRAMUSCULAR; INTRAVENOUS at 10:36

## 2024-11-28 NOTE — CONSULTS
Consultation - Neurology   Name: Tim Alanis Jr. 24 y.o. male I MRN: [id number]  Unit/Bed#: ICU 04 I Date of Admission: 11/28/2024   Date of Service: 11/28/2024 I Hospital Day: 0     Physician Requesting Evaluation: Aileen Block MD   Reason for Evaluation / Principal Problem: status epilepticus    Assessment & Plan  Status epilepticus (HCC)  Patient in status epilepticus after at least 3 seizures resulting in intubation.  Unclear if patient takes his home 1.5 g twice daily Keppra and zonisamide 300 mg daily.  Urine was positive for amphetamines, benzodiazepines, THC.  Patient currently intubated on Precedex, propofol.  Suspect anion gap metabolic acidosis and lactic acidosis secondary to seizures.    Plan discussed with attending, Dr. Fonseca  Continue home Keppra 1.5 g twice daily and zonisamide 300 mg daily  Follow-up on zonisamide level  Video EEG  Continue to utilize sedation for seizure suppression  Optimize metabolic derangements  Remainder of care per primary team        Recommendations for outpatient neurological follow up have yet to be determined.      SUBJECTIVE     Reason for Consult / Principal Problem: Status epilepticus  Hx and PE limited by: Intubated and sedated    HPI: Tim Alanis Jr. is a 24 y.o. right handed male who presents with altered mentation to [facility] where patient was unresponsive and intubated due to copious secretions.  Patient's mother witnessed at least 3 seizure-like episodes with confusion and no return to baseline.  Patient received 3 g bolus of Keppra and 7 of Versed.  Patient had anion gap metabolic acidosis with lactic acidosis and was positive for amphetamines in urine.  Patient was transferred to [facility] for further management on Precedex and propofol.    Inpatient consult to Neurology  Consult performed by: Elisabeth Bobo MD  Consult ordered by: Yung Moore MD          Historical Information   Past Medical History:   Diagnosis Date    Seizure (HCC)      No past  surgical history on file.  Social History   Social History     Substance and Sexual Activity   Alcohol Use Not Currently     Social History     Substance and Sexual Activity   Drug Use Yes    Types: Marijuana, Methamphetamines    Comment: per mother pt has history of meth amphetamine abuse     E-Cigarette/Vaping    E-Cigarette Use Never User      E-Cigarette/Vaping Substances    Nicotine No     THC No     CBD No     Flavoring No     Other No     Unknown No      Social History     Tobacco Use   Smoking Status Every Day    Current packs/day: 1.00    Average packs/day: 1 pack/day for 5.0 years (5.0 ttl pk-yrs)    Types: Cigarettes   Smokeless Tobacco Never     Family History: No family history on file.  Meds/Allergies   current meds:   Current Facility-Administered Medications:     chlorhexidine (PERIDEX) 0.12 % oral rinse 15 mL, Q12H DAYO    dexmedeTOMIDine (Precedex) 400 mcg in sodium chloride 0.9% 100 mL, Titrated, Last Rate: 0.4 mcg/kg/hr (11/28/24 1701)    fentaNYL 1000 mcg in sodium chloride 0.9% 100mL infusion, Continuous, Last Rate: 100 mcg/hr (11/28/24 1701)    multi-electrolyte (PLASMALYTE-A/ISOLYTE-S PH 7.4) IV solution, Continuous, Last Rate: 75 mL/hr (11/28/24 1621)    propofol (DIPRIVAN) 1000 mg in 100 mL infusion (premix), Titrated, Last Rate: 50 mcg/kg/min (11/28/24 1700) and PTA meds:   Prior to Admission Medications   Prescriptions Last Dose Informant Patient Reported? Taking?   levETIRAcetam (KEPPRA) 500 mg tablet   No No   Sig: Take 3 tablets (1,500 mg total) by mouth every 12 (twelve) hours   zonisamide (ZONEGRAN) 100 mg capsule   No No   Sig: Take 3 capsules (300 mg total) by mouth daily      Facility-Administered Medications: None     No Known Allergies    Review of previous medical records was completed.       Review of Systems    OBJECTIVE     Patient ID: Tim Alanis Jr. is a 24 y.o. male.    Vitals:   Vitals:    11/28/24 1600   BP: 127/86   Pulse: 60   Resp: 14   Temp: (!) 95.6 °F (35.3 °C)    TempSrc: Rectal   SpO2: 100%   Weight: 80.9 kg (178 lb 5.6 oz)   Height: 6' (1.829 m)      Body mass index is 24.19 kg/m².     Intake/Output Summary (Last 24 hours) at 2024 1702  Last data filed at 2024 1701  Gross per 24 hour   Intake --   Output 250 ml   Net -250 ml       Temperature:   Temp (24hrs), Av.2 °F (35.7 °C), Min:95.6 °F (35.3 °C), Max:96.8 °F (36 °C)    Temperature: (!) 95.6 °F (35.3 °C)    Invasive Devices:   Invasive Devices       Peripheral Intravenous Line  Duration             Peripheral IV 24 Left Antecubital <1 day    Peripheral IV 24 Right Antecubital <1 day    Peripheral IV 24 Right;Ventral (anterior) Forearm <1 day              Drain  Duration             NG/OG/Enteral Tube Orogastric 18 Fr Center mouth <1 day    Urethral Catheter Straight-tip 18 Fr. <1 day              Airway  Duration             ETT  Cuffed 8 mm <1 day    ETT  Oral;Hi-Lo;Cuffed 8 mm <1 day                    Physical Exam  Eyes:      Pupils: Pupils are equal, round, and reactive to light.          Neurologic Exam     Mental Status   Level of consciousness: unresponsive to painful stimuli  Intubated and sedated on Precedex, propofol, fentanyl     Cranial Nerves     CN III, IV, VI   Pupils are equal, round, and reactive to light.  Eyes midline  No blink to threat     Motor Exam No withdrawal in all 4 extremities          LABORATORY DATA     Labs: I have personally reviewed pertinent reports.    Results from last 7 days   Lab Units 24  1055   WBC Thousand/uL 18.87*   HEMOGLOBIN g/dL 15.4   HEMATOCRIT % 50.0*   PLATELETS Thousands/uL 295   MONO PCT % 9   EOS PCT % 4      Results from last 7 days   Lab Units 24  1055   POTASSIUM mmol/L 4.0   CHLORIDE mmol/L 100   CO2 mmol/L 7*   BUN mg/dL 12   CREATININE mg/dL 1.27   CALCIUM mg/dL 10.4*   ALK PHOS U/L 72   ALT U/L 14   AST U/L 18     Results from last 7 days   Lab Units 24  1055   MAGNESIUM mg/dL 2.6          Results from  last 7 days   Lab Units 11/28/24  1055   INR  1.13     Results from last 7 days   Lab Units 11/28/24  1326   LACTIC ACID mmol/L 1.2           IMAGING & DIAGNOSTIC TESTING     Radiology Results: I have personally reviewed pertinent reports.    No orders to display       Other Diagnostic Testing: I have personally reviewed pertinent reports.      ACTIVE MEDICATIONS       Current Facility-Administered Medications:     chlorhexidine (PERIDEX) 0.12 % oral rinse 15 mL, Q12H DAYO    dexmedeTOMIDine (Precedex) 400 mcg in sodium chloride 0.9% 100 mL, Titrated, Last Rate: 0.4 mcg/kg/hr (11/28/24 1701)    fentaNYL 1000 mcg in sodium chloride 0.9% 100mL infusion, Continuous, Last Rate: 100 mcg/hr (11/28/24 1701)    multi-electrolyte (PLASMALYTE-A/ISOLYTE-S PH 7.4) IV solution, Continuous, Last Rate: 75 mL/hr (11/28/24 1621)    propofol (DIPRIVAN) 1000 mg in 100 mL infusion (premix), Titrated, Last Rate: 50 mcg/kg/min (11/28/24 1700)    Prior to Admission medications    Medication Sig Start Date End Date Taking? Authorizing Provider   levETIRAcetam (KEPPRA) 500 mg tablet Take 3 tablets (1,500 mg total) by mouth every 12 (twelve) hours 4/29/24   Shakeel Alcala MD   zonisamide (ZONEGRAN) 100 mg capsule Take 3 capsules (300 mg total) by mouth daily 2/14/24 10/17/24  Darlene Crowe MD       CODE STATUS & ADVANCED DIRECTIVES     Code Status: Level 1 - Full Code  Advance Directive and Living Will:      Power of :    POLST:        VTE Pharmacologic Prophylaxis: Enoxaparin (Lovenox)  VTE Mechanical Prophylaxis: sequential compression device    ======    I have discussed the patient's history, physical exam findings, assessment, and plan in detail with attending, Dr. Fonseca    Thank you for allowing me to participate in the care of your patient, Tim Alanis Jr..    Elisabeth Bobo MD  [facility] Neurology Residency, PGY-2

## 2024-11-28 NOTE — EMTALA/ACUTE CARE TRANSFER
[facility] EMERGENCY DEPARTMENT  [address] [facility] [address]  Dept: [phone]      EMTALA TRANSFER CONSENT    NAME Tim Alanis Jr.                                         2000                              MRN [id number]    I have been informed of my rights regarding examination, treatment, and transfer   by Dr. Joon Gomez DO    Benefits: Specialized equipment and/or services available at the receiving facility (Include comment)________________________    Risks: Potential for delay in receiving treatment      Consent for Transfer:  I acknowledge that my medical condition has been evaluated and explained to me by the emergency department physician or other qualified medical person and/or my attending physician, who has recommended that I be transferred to the service of  Accepting Physician: Dr. Block at Accepting Facility Name, City & State : [facility], [place], [place]. The above potential benefits of such transfer, the potential risks associated with such transfer, and the probable risks of not being transferred have been explained to me, and I fully understand them.  The doctor has explained that, in my case, the benefits of transfer outweigh the risks.  I agree to be transferred.    I authorize the performance of emergency medical procedures and treatments upon me in both transit and upon arrival at the receiving facility.  Additionally, I authorize the release of any and all medical records to the receiving facility and request they be transported with me, if possible.  I understand that the safest mode of transportation during a medical emergency is an ambulance and that the Hospital advocates the use of this mode of transport. Risks of traveling to the receiving facility by car, including absence of medical control, life sustaining equipment, such as oxygen, and medical personnel has been explained to me and I fully understand them.    (PEDRO PABLO CORRECT BOX  BELOW)  [  ]  I consent to the stated transfer and to be transported by ambulance/helicopter.  [  ]  I consent to the stated transfer, but refuse transportation by ambulance and accept full responsibility for my transportation by car.  I understand the risks of non-ambulance transfers and I exonerate the Hospital and its staff from any deterioration in my condition that results from this refusal.    X___________________________________________    DATE  24  TIME________  Signature of patient or legally responsible individual signing on patient behalf           RELATIONSHIP TO PATIENT_________________________          Provider Certification    NAME Tim Alanis                                          2000                              MRN [id number]    A medical screening exam was performed on the above named patient.  Based on the examination:    Condition Necessitating Transfer The primary encounter diagnosis was Status epilepticus (HCC). Diagnoses of Acute respiratory failure with hypoxia (HCC), Methamphetamine use (HCC), Lactic acidosis, and High anion gap metabolic acidosis were also pertinent to this visit.    Patient Condition: The patient has been stabilized such that within reasonable medical probability, no material deterioration of the patient condition or the condition of the unborn child(colin) is likely to result from the transfer    Reason for Transfer: Level of Care needed not available at this facility    Transfer Requirements: Facility [facility], [place], [place]   Space available and qualified personnel available for treatment as acknowledged by    Agreed to accept transfer and to provide appropriate medical treatment as acknowledged by       Dr. Block  Appropriate medical records of the examination and treatment of the patient are provided at the time of transfer   STAFF INITIAL WHEN COMPLETED _______  Transfer will be performed by qualified personnel from    and  appropriate transfer equipment as required, including the use of necessary and appropriate life support measures.    Provider Certification: I have examined the patient and explained the following risks and benefits of being transferred/refusing transfer to the patient/family:         Based on these reasonable risks and benefits to the patient and/or the unborn child(colin), and based upon the information available at the time of the patient’s examination, I certify that the medical benefits reasonably to be expected from the provision of appropriate medical treatments at another medical facility outweigh the increasing risks, if any, to the individual’s medical condition, and in the case of labor to the unborn child, from effecting the transfer.    X____________________________________________ DATE 11/28/24        TIME_______      ORIGINAL - SEND TO MEDICAL RECORDS   COPY - SEND WITH PATIENT DURING TRANSFER

## 2024-11-28 NOTE — RESPIRATORY THERAPY NOTE
RT Ventilator Management Note  Tim Alanis Jr. 24 y.o. male MRN: [id number]  Unit/Bed#: ED 09 Encounter: [id number]      Daily Screen         11/28/2024  1100             Patient safety screen outcome:: Failed    Not Ready for Weaning due to:: Underline problem not resolved              Physical Exam:   Assessment Type: Assess only  General Appearance: Unresponsive  Respiratory Pattern: Assisted  Chest Assessment: Chest expansion symmetrical  Bilateral Breath Sounds: Clear, Diminished  O2 Device: MR1 50%      Resp Comments: Pt brought in by EMS, seizure like activity, unresponsive. Pt intubated for airway protection. ETT 8.0, 25 @ lip on right, secure with commercial tube vallecillo. Intubated on first attempt by Dr. Gomez, glidescope, good color change ETCO2 x6 breaths. Pt placed on APVcmv 14/480/100%/+6. FiO2 titrated to 50%. Pt to CT and back without issue.

## 2024-11-28 NOTE — ED NOTES
Successful intubation by Dr. Gomez  8.0 tube  25 at the lip.     Allison A Schoener, RN  11/28/24 9090

## 2024-11-28 NOTE — ED PROVIDER NOTES
Time reflects when diagnosis was documented in both MDM as applicable and the Disposition within this note       Time User Action Codes Description Comment    11/28/2024 12:12 PM Halupa, Joon Add [G40.901] Status epilepticus (HCC)     11/28/2024 12:12 PM Halupa, Joon Add [J96.01] Acute respiratory failure with hypoxia (HCC)     11/28/2024 12:12 PM Halupa, Joon Add [E87.20] Metabolic acidosis     11/28/2024 12:12 PM Halupa, Joon Add [F15.10] Methamphetamine use (HCC)     11/28/2024 12:13 PM Halupa, Joon Add [E87.20] Lactic acidosis     11/28/2024 12:13 PM Halupa, Joon Add [E87.29] High anion gap metabolic acidosis     11/28/2024 12:13 PM Halupa, Joon Remove [E87.20] Metabolic acidosis           ED Disposition       ED Disposition   Transfer to Another Facility-In Network    Condition   --    Date/Time   Thu Nov 28, 2024 12:22 PM    Comment   Tim Alanis Jr. should be transferred out to [facility].               Assessment & Plan       Medical Decision Making  This patient presents with altered mental status, multiple seizures.   Diagnostic considerations include status epilepticus, ICH, meningitis, toxidrome, cervical spine injury, electrolyte abnormalities.    Vital signs reviewed.  Patient unresponsive upon arrival with copious amounts of saliva in the oropharynx. The patient was intubated for airway protection. IV propofol/fentanyl gtt for post-intubation sedation. The patient was loaded with IV Keppra 3 g.     Laboratory workup significant for high anion gap metabolic acidosis, lactic acidosis likely secondary to seizure activity/status epilepticus.  Metabolic acidosis, lactic acidosis improved status post fluid resuscitation, increased ventilatory rate. CT imaging without acute findings. The case was discussed with Neurology who recommended transfer to [facility]. Transfer accepted by [facility].     Due to the increasing sedation requirements, the patient was started on Precedex and required IV midazolam  bolus.  Hemodynamics were stable throughout the course of treatment. Did not require vasopressor support. The patient was transported to \[facility]\"" by [facility].         Problems Addressed:  Acute respiratory failure with hypoxia (HCC): acute illness or injury  High anion gap metabolic acidosis: acute illness or injury  Lactic acidosis: acute illness or injury  Methamphetamine use (HCC): chronic illness or injury with exacerbation, progression, or side effects of treatment  Status epilepticus (HCC): acute illness or injury    Amount and/or Complexity of Data Reviewed  Independent Historian: parent and EMS     Details: The patient's hx was obtained via EMS and the patient's mother. Patient unable to provide hx 2/2 AMS.   External Data Reviewed: labs, radiology and notes.  Labs: ordered.  Radiology: ordered.     Details: CT imaging without acute findings.   ECG/medicine tests: ordered and independent interpretation performed.     Details: ECG interpreted by me.  Sinus tachycardia.  Heart rate 115 bpm.  Normal axis.  No acute ischemic changes.  No signs of unstable arrhythmia.  Discussion of management or test interpretation with external provider(s): The case and treatment plan were discussed with Neurology and CCM.     Risk  Prescription drug management.  Parenteral controlled substances.  Decision regarding hospitalization.      ED Course as of 11/28/24 1441   Thu Nov 28, 2024   1155 Late entry. I contacted the patient's mother. She states that she witnessed at least three episodes of seizure like activity followed by agitation/confusion.  Never returned to baseline mental status.  She called EMS once she noticed the third episode of generalized convulsions. I discussed with her that her son will likely require transfer to a tertiary center. OK with \[facility]\"". Verbal consent obtained via phone call.    Patient is moving in the bed, reaching for endotracheal tube.  The patient was bolused with IV propofol 100 mg.  Propofol infusion  increased to 30 mcg/kg/min, IV fentanyl increased to 100 mcg/min.    1206 Propofol increased to 35 mcg/kg/min.  Rebolused with 100 mg.   1240 Propofol increased to 50 mcg/kg/min. Rebolused with 150 mg.        Medications   propofol (DIPRIVAN) 1000 mg in 100 mL infusion (premix) (65 mcg/kg/min × 78.6 kg Intravenous Rate/Dose Change 11/28/24 1437)   fentaNYL 1000 mcg in sodium chloride 0.9% 100mL infusion (100 mcg/hr Intravenous Rate/Dose Change 11/28/24 1156)   multi-electrolyte (PLASMALYTE-A/ISOLYTE-S PH 7.4) IV solution (125 mL/hr Intravenous New Bag 11/28/24 1242)   dexmedeTOMIDine (Precedex) 400 mcg in sodium chloride 0.9% 100 mL (0.5 mcg/kg/hr × 78.6 kg Intravenous New Bag 11/28/24 1405)   ROCuronium (ZEMURON) injection 100 mg (100 mg Intravenous Given 11/28/24 1040)   midazolam (VERSED) injection 2 mg (2 mg Intravenous Given 11/28/24 1036)   propofol (DIPRIVAN) 200 MG/20ML bolus injection 100 mg (100 mg Intravenous Given 11/28/24 1040)   multi-electrolyte (ISOLYTE-S PH 7.4) bolus 1,000 mL (0 mL Intravenous Stopped 11/28/24 1230)   levETIRAcetam (KEPPRA) injection 3,000 mg (3,000 mg Intravenous Given 11/28/24 1113)   multi-electrolyte (ISOLYTE-S PH 7.4) bolus 1,000 mL (0 mL Intravenous Stopped 11/28/24 1230)   propofol (DIPRIVAN) 200 MG/20ML bolus injection 100 mg (100 mg Intravenous Given 11/28/24 1155)   propofol (DIPRIVAN) 200 MG/20ML bolus injection 100 mg (100 mg Intravenous Given 11/28/24 1204)   propofol (DIPRIVAN) 200 MG/20ML bolus injection 150 mg (150 mg Intravenous Given 11/28/24 1240)   propofol (DIPRIVAN) 200 MG/20ML bolus injection 150 mg (150 mg Intravenous Given 11/28/24 1353)   midazolam (VERSED) injection 5 mg (5 mg Intravenous Given 11/28/24 1436)     ED Risk Strat Scores           History of Present Illness       Chief Complaint   Patient presents with    Seizure - Prior Hx Of     Patient arrived via EMS for seizures x6 at the home. Patient unresponsive on arrival.     Past Medical History:  "  Diagnosis Date    Seizure (HCC)       History reviewed. No pertinent surgical history.   History reviewed. No pertinent family history.   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     Patient presents following multiple seizures.  Per EMS, the patient was \"out partying\" last night.  Per chart review, the patient is a history of seizures.  On zonisamide/Keppra.  EMS states that the patient had at least 6 episodes of seizure-like activity with postictal period.  Did not return to baseline mental status between seizures.  Was administered a dose of IV midazolam 2.5 mg PTA.       History provided by:  EMS personnel and medical records  History limited by:  Patient unresponsive  Seizure - Prior Hx Of  Seizure activity on arrival: no    Seizure type:  Grand mal  Initial focality:  Diffuse  Episode characteristics: abnormal movements, combativeness, confusion, disorientation, stiffening and unresponsiveness    Postictal symptoms: confusion and somnolence    Return to baseline: no    Timing:  Clustered  PTA treatment:  Midazolam  History of seizures: yes      Review of Systems   Unable to perform ROS: Patient unresponsive     Objective       ED Triage Vitals   Temperature Pulse Blood Pressure Respirations SpO2 Patient Position - Orthostatic VS   11/28/24 1029 11/28/24 1029 11/28/24 1029 11/28/24 1029 11/28/24 1027 " 11/28/24 1029   (!) 96.8 °F (36 °C) (!) 111 130/82 (!) 28 99 % Lying      Temp Source Heart Rate Source BP Location FiO2 (%) Pain Score    11/28/24 1029 11/28/24 1029 11/28/24 1145 -- 11/28/24 1112    Tympanic Monitor Left arm  Med Not Given for Pain - for MAR use only      Vitals      Date and Time Temp Pulse SpO2 Resp BP Pain Score FACES Pain Rating User   11/28/24 1415 -- 64 100 % 14 115/73 -- -- AS   11/28/24 1400 -- 70 100 % 14 114/64 -- -- AS   11/28/24 1330 -- 65 100 % 14 123/75 -- -- AS   11/28/24 1315 -- 66 100 % 14 120/72 -- -- AS   11/28/24 1300 -- 70 100 % 14 116/69 -- -- AS   11/28/24 1215 -- 82 100 % 14 127/76 -- -- AS   11/28/24 1200 -- 86 100 % 15 142/78 -- -- AS   11/28/24 1145 -- 98 99 % 16 157/87 -- -- AS   11/28/24 1130 -- 103 99 % 14 187/99 -- -- AS   11/28/24 1115 -- 115 99 % 14 183/101 -- -- AS   11/28/24 1112 -- -- -- -- -- Med Not Given for Pain - for MAR use only -- AS   11/28/24 1100 -- 122 98 % 14 -- -- -- TLS   11/28/24 1100 -- -- -- -- 156/77 -- -- AS   11/28/24 1045 -- 118 99 % 14 140/71 -- -- AS   11/28/24 1036 -- 122 -- -- 148/89 -- -- AS   11/28/24 1029 96.8 °F (36 °C) 111 95 % 28 130/82 -- -- AS   11/28/24 1027 -- -- 99 % -- -- -- -- AS          Physical Exam  Vitals and nursing note reviewed.   Constitutional:       General: He is in acute distress.      Appearance: He is ill-appearing, toxic-appearing and diaphoretic.   HENT:      Head: Normocephalic and atraumatic.      Mouth/Throat:      Comments: Copious amount of secretions in the patient's oropharynx  Eyes:      General: No scleral icterus.        Right eye: No discharge.         Left eye: No discharge.      Comments: Sluggish pupils bilaterally   Cardiovascular:      Rate and Rhythm: Regular rhythm. Tachycardia present.      Pulses: Normal pulses.      Heart sounds: Normal heart sounds. No murmur heard.  Pulmonary:      Effort: Pulmonary effort is normal. No respiratory distress.      Breath sounds: Normal breath sounds. No  wheezing or rhonchi.   Abdominal:      General: Bowel sounds are normal.      Palpations: Abdomen is soft.   Musculoskeletal:      Right lower leg: No edema.      Left lower leg: No edema.   Skin:     General: Skin is warm.      Capillary Refill: Capillary refill takes less than 2 seconds.   Neurological:      Mental Status: He is unresponsive.      GCS: GCS eye subscore is 1. GCS verbal subscore is 1. GCS motor subscore is 1.       Results Reviewed       Procedure Component Value Units Date/Time    Lactic acid 2 Hours [[id number]]  (Normal) Collected: 11/28/24 1326    Lab Status: Final result Specimen: Blood from Arm, Right Updated: 11/28/24 1347     LACTIC ACID 1.2 mmol/L     Narrative:      Result may be elevated if tourniquet was used during collection.    Blood gas, venous [[id number]]  (Abnormal) Collected: 11/28/24 1240    Lab Status: Final result Specimen: Blood from Arm, Right Updated: 11/28/24 1247     pH, Pradip 7.291     pCO2, Pradip 49.7 mm Hg      pO2, Pradip 38.6 mm Hg      HCO3, Pradip 23.4 mmol/L      Base Excess, Pradip -3.6 mmol/L      O2 Content, Pradip 13.3 ml/dL      O2 HGB, VENOUS 66.6 %     Rapid drug screen, urine [[id number]]  (Abnormal) Collected: 11/28/24 1106    Lab Status: Final result Specimen: Urine, Catheter Updated: 11/28/24 1140     Amph/Meth UR Positive     Barbiturate Ur Negative     Benzodiazepine Urine Positive     Cocaine Urine Negative     Methadone Urine Negative     Opiate Urine Negative     PCP Ur Negative     THC Urine Positive     Oxycodone Urine Negative     Fentanyl Urine Negative     HYDROCODONE URINE Negative    Narrative:      Presumptive report. If requested, specimen will be sent to reference lab for confirmation.  FOR MEDICAL PURPOSES ONLY.   IF CONFIRMATION NEEDED PLEASE CONTACT THE LAB WITHIN 5 DAYS.    Drug Screen Cutoff Levels:  AMPHETAMINE/METHAMPHETAMINES  1000 ng/mL  BARBITURATES     200 ng/mL  BENZODIAZEPINES     200 ng/mL  COCAINE      300 ng/mL  METHADONE      300  ng/mL  OPIATES      300 ng/mL  PHENCYCLIDINE     25 ng/mL  THC       50 ng/mL  OXYCODONE      100 ng/mL  FENTANYL      5 ng/mL  HYDROCODONE     300 ng/mL    Manual Differential(PHLEBS Do Not Order) [[id number]]  (Abnormal) Collected: 11/28/24 1055    Lab Status: Final result Specimen: Blood from Arm, Right Updated: 11/28/24 1140     Segmented % 51 %      Lymphocytes % 35 %      Monocytes % 9 %      Eosinophils % 4 %      Basophils % 0 %      Atypical Lymphocytes % 1 %      Absolute Neutrophils 9.62 Thousand/uL      Absolute Lymphocytes 6.79 Thousand/uL      Absolute Monocytes 1.70 Thousand/uL      Absolute Eosinophils 0.75 Thousand/uL      Absolute Basophils 0.00 Thousand/uL      Total Counted --     RBC Morphology Normal     Platelet Estimate Adequate     Large Platelet Present    Lactic acid, plasma (w/reflex if result > 2.0) [[id number]]  (Abnormal) Collected: 11/28/24 1055    Lab Status: Final result Specimen: Blood from Arm, Right Updated: 11/28/24 1136     LACTIC ACID 17.0 mmol/L     Narrative:      Result may be elevated if tourniquet was used during collection.    Salicylate level [[id number]]  (Normal) Collected: 11/28/24 1055    Lab Status: Final result Specimen: Blood from Arm, Right Updated: 11/28/24 1135     Salicylate Lvl <5 mg/dL     High Sensitivity Troponin I Random [[id number]]  (Abnormal) Collected: 11/28/24 1055    Lab Status: Final result Specimen: Blood from Arm, Right Updated: 11/28/24 1127     HS TnI random 4 ng/L     Blood gas, venous [[id number]]  (Abnormal) Collected: 11/28/24 1106    Lab Status: Final result Specimen: Blood from Arm, Right Updated: 11/28/24 1125     pH, Pradip 7.172     pCO2, Pradip 34.6 mm Hg      pO2, Pradip 125.8 mm Hg      HCO3, Pradip 12.4 mmol/L      Base Excess, Pradip -15.0 mmol/L      O2 Content, Pradip 20.6 ml/dL      O2 HGB, VENOUS 93.2 %     Hepatic function panel [[id number]]  (Abnormal) Collected: 11/28/24 1055    Lab Status: Final result Specimen: Blood from Arm, Right Updated:  "11/28/24 1125     Total Bilirubin 0.51 mg/dL      Bilirubin, Direct 0.11 mg/dL      Alkaline Phosphatase 72 U/L      AST 18 U/L      ALT 14 U/L      Total Protein 7.6 g/dL      Albumin 5.3 g/dL     Acetaminophen level-\"If concentration is detectable, please discuss with medical  on call.\" [[id number]]  (Abnormal) Collected: 11/28/24 1055    Lab Status: Final result Specimen: Blood from Arm, Right Updated: 11/28/24 1125     Acetaminophen Level <2 ug/mL     CK [[id number]]  (Normal) Collected: 11/28/24 1055    Lab Status: Final result Specimen: Blood from Arm, Right Updated: 11/28/24 1125     Total  U/L     Basic metabolic panel [[id number]]  (Abnormal) Collected: 11/28/24 1055    Lab Status: Final result Specimen: Blood from Arm, Right Updated: 11/28/24 1125     Sodium 138 mmol/L      Potassium 4.0 mmol/L      Chloride 100 mmol/L      CO2 7 mmol/L      ANION GAP 31 mmol/L      BUN 12 mg/dL      Creatinine 1.27 mg/dL      Glucose 139 mg/dL      Calcium 10.4 mg/dL      eGFR 79 ml/min/1.73sq m     Narrative:      [employer] guidelines for Chronic Kidney Disease (CKD):     Stage 1 with normal or high GFR (GFR > 90 mL/min/1.73 square meters)    Stage 2 Mild CKD (GFR = 60-89 mL/min/1.73 square meters)    Stage 3A Moderate CKD (GFR = 45-59 mL/min/1.73 square meters)    Stage 3B Moderate CKD (GFR = 30-44 mL/min/1.73 square meters)    Stage 4 Severe CKD (GFR = 15-29 mL/min/1.73 square meters)    Stage 5 End Stage CKD (GFR <15 mL/min/1.73 square meters)  Note: GFR calculation is accurate only with a steady state creatinine    Magnesium [[id number]]  (Normal) Collected: 11/28/24 1055    Lab Status: Final result Specimen: Blood from Arm, Right Updated: 11/28/24 1125     Magnesium 2.6 mg/dL     Urine Microscopic [[id number]] Collected: 11/28/24 1106    Lab Status: Final result Specimen: Urine, Straight Cath Updated: 11/28/24 1124     RBC, UA 2-4 /hpf      WBC, UA 0-1 /hpf      Epithelial Cells " Occasional /hpf      Bacteria, UA Occasional /hpf      OTHER OBSERVATIONS Sperm Present    Ethanol [[id number]]  (Normal) Collected: 11/28/24 1055    Lab Status: Final result Specimen: Blood from Arm, Right Updated: 11/28/24 1124     Ethanol Lvl <10 mg/dL     Ammonia [[id number]]  (Abnormal) Collected: 11/28/24 1055    Lab Status: Final result Specimen: Blood from Arm, Right Updated: 11/28/24 1123     Ammonia 95 umol/L     Protime-INR [[id number]]  (Normal) Collected: 11/28/24 1055    Lab Status: Final result Specimen: Blood from Arm, Right Updated: 11/28/24 1117     Protime 14.9 seconds      INR 1.13    Narrative:      INR Therapeutic Range    Indication                                             INR Range      Atrial Fibrillation                                               2.0-3.0  Hypercoagulable State                                    2.0.2.3  Left Ventricular Asist Device                            2.0-3.0  Mechanical Heart Valve                                  -    Aortic(with afib, MI, embolism, HF, LA enlargement,    and/or coagulopathy)                                     2.0-3.0 (2.5-3.5)     Mitral                                                             2.5-3.5  Prosthetic/Bioprosthetic Heart Valve               2.0-3.0  Venous thromboembolism (VTE: VT, PE        2.0-3.0    UA w Reflex to Microscopic w Reflex to Culture [[id number]]  (Abnormal) Collected: 11/28/24 1106    Lab Status: Final result Specimen: Urine, Straight Cath Updated: 11/28/24 1116     Color, UA Yellow     Clarity, UA Clear     Specific Gravity, UA >=1.030     pH, UA 5.5     Leukocytes, UA Negative     Nitrite, UA Negative     Protein, UA Negative mg/dl      Glucose, UA Negative mg/dl      Ketones, UA Negative mg/dl      Urobilinogen, UA 0.2 E.U./dl      Bilirubin, UA Negative     Occult Blood, UA Moderate    CBC and differential [[id number]]  (Abnormal) Collected: 11/28/24 1055    Lab Status: Final result Specimen: Blood from Arm,  Right Updated: 11/28/24 1114     WBC 18.87 Thousand/uL      RBC 5.08 Million/uL      Hemoglobin 15.4 g/dL      Hematocrit 50.0 %      MCV 98 fL      MCH 30.3 pg      MCHC 30.8 g/dL      RDW 12.3 %      MPV 11.4 fL      Platelets 295 Thousands/uL     Narrative:      This is an appended report.  These results have been appended to a previously verified report.    Zonisamide level [[id number]] Collected: 11/28/24 1055    Lab Status: In process Specimen: Blood from Arm, Right Updated: 11/28/24 1059          CT head without contrast   Final Interpretation by uSng Lynne MD (11/28 1133)      No acute intracranial abnormality.                  Workstation performed: [id number]         CT spine cervical without contrast   Final Interpretation by Sung Lynne MD (11/28 1135)      No cervical spine fracture or traumatic malalignment.                  Workstation performed: [id number]         CT chest abdomen pelvis wo contrast   Final Interpretation by Sung Lynne MD (11/28 1142)      No identifiable acute abnormality to account for the patient's clinical presentation.               Workstation performed: [id number]           CriticalCare Time    Date/Time: 11/28/2024 2:37 PM    Performed by: Joon Gomez DO  Authorized by: Joon Gomez DO    Critical care provider statement:     Critical care time (minutes):  95    Critical care time was exclusive of:  Separately billable procedures and treating other patients    Critical care was necessary to treat or prevent imminent or life-threatening deterioration of the following conditions:  CNS failure or compromise, dehydration, hepatic failure, metabolic crisis, respiratory failure, shock, toxidrome and renal failure    Critical care was time spent personally by me on the following activities:  Blood draw for specimens, obtaining history from patient or surrogate, development of treatment plan with patient or surrogate, discussions with  consultants, evaluation of patient's response to treatment, examination of patient, review of old charts, re-evaluation of patient's condition, ordering and review of radiographic studies, ordering and review of laboratory studies and ordering and performing treatments and interventions  Intubation    Date/Time: 11/28/2024 10:42 AM    Performed by: Joon Gomez DO  Authorized by: Joon Gomez DO    Patient location:  ED  Consent:     Consent obtained:  Emergent situation  Pre-procedure details:     Patient status:  Unresponsive    Pretreatment medications:  Propofol and midazolam    Paralytics:  Rocuronium  Indications:     Indications for intubation: respiratory failure and airway protection    Procedure details:     Preoxygenation:  Nasal cannula    CPR in progress: no      Intubation method:  Oral    Oral intubation technique:  Glidescope    Tube size (mm):  8.0    Tube type:  Cuffed    Number of attempts:  1    Ventilation between attempts: no      Cricoid pressure: no      Tube visualized through cords: yes    Placement assessment:     ETT to lip:  25    Tube secured with:  ETT vallecillo    Breath sounds:  Equal    Placement verification: chest rise, direct visualization, ETCO2 detector and capnography      CXR findings:  ETT in proper place  Post-procedure details:     Patient tolerance of procedure:  Tolerated well, no immediate complications    ED Medication and Procedure Management   Prior to Admission Medications   Prescriptions Last Dose Informant Patient Reported? Taking?   levETIRAcetam (KEPPRA) 500 mg tablet   No No   Sig: Take 3 tablets (1,500 mg total) by mouth every 12 (twelve) hours   zonisamide (ZONEGRAN) 100 mg capsule   No No   Sig: Take 3 capsules (300 mg total) by mouth daily      Facility-Administered Medications: None     Patient's Medications   Discharge Prescriptions    No medications on file     No discharge procedures on file.  ED SEPSIS DOCUMENTATION   Time reflects when  diagnosis was documented in both MDM as applicable and the Disposition within this note       Time User Action Codes Description Comment    11/28/2024 12:12 PM Halupa, Joon Add [G40.901] Status epilepticus (HCC)     11/28/2024 12:12 PM Halupa, Joon Add [J96.01] Acute respiratory failure with hypoxia ([facility])     11/28/2024 12:12 PM Halupa, Joon Add [E87.20] Metabolic acidosis     11/28/2024 12:12 PM Halupa, Joon Add [F15.10] Methamphetamine use ([facility])     11/28/2024 12:13 PM Halupa, Joon Add [E87.20] Lactic acidosis     11/28/2024 12:13 PM Halupa, Joon Add [E87.29] High anion gap metabolic acidosis     11/28/2024 12:13 PM Halupa, Joon Remove [E87.20] Metabolic acidosis                  Joon Gomez, DO  11/28/24 1315

## 2024-11-28 NOTE — PLAN OF CARE
Problem: PAIN - ADULT  Goal: Verbalizes/displays adequate comfort level or baseline comfort level  Description: Interventions:  - Encourage patient to monitor pain and request assistance  - Assess pain using appropriate pain scale  - Administer analgesics based on type and severity of pain and evaluate response  - Implement non-pharmacological measures as appropriate and evaluate response  - Consider cultural and social influences on pain and pain management  - Notify physician/advanced practitioner if interventions unsuccessful or patient reports new pain  Outcome: Progressing     Problem: INFECTION - ADULT  Goal: Absence or prevention of progression during hospitalization  Description: INTERVENTIONS:  - Assess and monitor for signs and symptoms of infection  - Monitor lab/diagnostic results  - Monitor all insertion sites, i.e. indwelling lines, tubes, and drains  - Monitor endotracheal if appropriate and nasal secretions for changes in amount and color  - Ronks appropriate cooling/warming therapies per order  - Administer medications as ordered  - Instruct and encourage patient and family to use good hand hygiene technique  - Identify and instruct in appropriate isolation precautions for identified infection/condition  Outcome: Progressing     Problem: SAFETY ADULT  Goal: Patient will remain free of falls  Description: INTERVENTIONS:  - Educate patient/family on patient safety including physical limitations  - Instruct patient to call for assistance with activity   - Consult OT/PT to assist with strengthening/mobility   - Keep Call bell within reach  - Keep bed low and locked with side rails adjusted as appropriate  - Keep care items and personal belongings within reach  - Initiate and maintain comfort rounds  - Make Fall Risk Sign visible to staff  - Offer Toileting every 2 Hours, in advance of need  - Initiate/Maintain bed alarm  - Obtain necessary fall risk management equipment:   - Apply yellow socks and  bracelet for high fall risk patients  - Consider moving patient to room near nurses station  Outcome: Progressing  Goal: Maintain or return to baseline ADL function  Description: INTERVENTIONS:  -  Assess patient's ability to carry out ADLs; assess patient's baseline for ADL function and identify physical deficits which impact ability to perform ADLs (bathing, care of mouth/teeth, toileting, grooming, dressing, etc.)  - Assess/evaluate cause of self-care deficits   - Assess range of motion  - Assess patient's mobility; develop plan if impaired  - Assess patient's need for assistive devices and provide as appropriate  - Encourage maximum independence but intervene and supervise when necessary  - Involve family in performance of ADLs  - Assess for home care needs following discharge   - Consider OT consult to assist with ADL evaluation and planning for discharge  - Provide patient education as appropriate  Outcome: Progressing  Goal: Maintains/Returns to pre admission functional level  Description: INTERVENTIONS:  - Perform AM-PAC 6 Click Basic Mobility/ Daily Activity assessment daily.  - Set and communicate daily mobility goal to care team and patient/family/caregiver.   - Collaborate with rehabilitation services on mobility goals if consulted  - Perform Range of Motion 4 times a day.  - Reposition patient every 2 hours.  - Dangle patient 3 times a day  - Stand patient 3 times a day  - Ambulate patient 3 times a day  - Out of bed to chair 3 times a day   - Out of bed for meals 3 times a day  - Out of bed for toileting  - Record patient progress and toleration of activity level   Outcome: Progressing     Problem: DISCHARGE PLANNING  Goal: Discharge to home or other facility with appropriate resources  Description: INTERVENTIONS:  - Identify barriers to discharge w/patient and caregiver  - Arrange for needed discharge resources and transportation as appropriate  - Identify discharge learning needs (meds, wound care,  etc.)  - Arrange for interpretive services to assist at discharge as needed  - Refer to Case Management Department for coordinating discharge planning if the patient needs post-hospital services based on physician/advanced practitioner order or complex needs related to functional status, cognitive ability, or social support system  Outcome: Progressing     Problem: Knowledge Deficit  Goal: Patient/family/caregiver demonstrates understanding of disease process, treatment plan, medications, and discharge instructions  Description: Complete learning assessment and assess knowledge base.  Interventions:  - Provide teaching at level of understanding  - Provide teaching via preferred learning methods  Outcome: Progressing     Problem: SAFETY,RESTRAINT: NV/NON-SELF DESTRUCTIVE BEHAVIOR  Goal: Remains free of harm/injury (restraint for non violent/non self-detsructive behavior)  Description: INTERVENTIONS:  - Instruct patient/family regarding restraint use   - Assess and monitor physiologic and psychological status   - Provide interventions and comfort measures to meet assessed patient needs   - Identify and implement measures to help patient regain control  - Assess readiness for release of restraint   Outcome: Progressing  Goal: Returns to optimal restraint-free functioning  Description: INTERVENTIONS:  - Assess the patient's behavior and symptoms that indicate continued need for restraint  - Identify and implement measures to help patient regain control  - Assess readiness for release of restraint   Outcome: Progressing     Problem: Prexisting or High Potential for Compromised Skin Integrity  Goal: Skin integrity is maintained or improved  Description: INTERVENTIONS:  - Identify patients at risk for skin breakdown  - Assess and monitor skin integrity  - Assess and monitor nutrition and hydration status  - Monitor labs   - Assess for incontinence   - Turn and reposition patient  - Assist with mobility/ambulation  -  Relieve pressure over bony prominences  - Avoid friction and shearing  - Provide appropriate hygiene as needed including keeping skin clean and dry  - Evaluate need for skin moisturizer/barrier cream  - Collaborate with interdisciplinary team   - Patient/family teaching  - Consider wound care consult   Outcome: Progressing     Problem: Nutrition/Hydration-ADULT  Goal: Nutrient/Hydration intake appropriate for improving, restoring or maintaining nutritional needs  Description: Monitor and assess patient's nutrition/hydration status for malnutrition. Collaborate with interdisciplinary team and initiate plan and interventions as ordered.  Monitor patient's weight and dietary intake as ordered or per policy. Utilize nutrition screening tool and intervene as necessary. Determine patient's food preferences and provide high-protein, high-caloric foods as appropriate.     INTERVENTIONS:  - Monitor oral intake, urinary output, labs, and treatment plans  - Assess nutrition and hydration status and recommend course of action  - Evaluate amount of meals eaten  - Assist patient with eating if necessary   - Allow adequate time for meals  - Recommend/ encourage appropriate diets, oral nutritional supplements, and vitamin/mineral supplements  - Order, calculate, and assess calorie counts as needed  - Recommend, monitor, and adjust tube feedings and TPN/PPN based on assessed needs  - Assess need for intravenous fluids  - Provide specific nutrition/hydration education as appropriate  - Include patient/family/caregiver in decisions related to nutrition  Outcome: Progressing

## 2024-11-28 NOTE — H&P
"H&P - Critical Care/ICU   Name: Tim Alanis Jr. 24 y.o. male I MRN: [id number]  Unit/Bed#: ICU 04 I Date of Admission: 11/28/2024   Date of Service: 11/28/2024 I Hospital Day: 0       Assessment & Plan   Neuro:   Diagnosis: Status epilepticus  CT head without contrast 11/28/2024: \"No acute intracranial abnormality.\"  CT spine cervical without contrast 11/28/2024: \"No cervical spine fracture or traumatic malalignment.\"  CT chest abdomen pelvis without contrast 11/28/2024: \"No identifiable acute abnormality to account for the patient's clinical presentation.\"  Plan:  Neurochecks q1h  Sedation:  Propofol  Precedex  Fentanyl  Neurology consulted, appreciate recommendations  Positive screen for methamphetamines, THC, benzodiazepines  Seizure precautions-restraints  Keppra 1500 mg every 12 hours  Zonisamide 300 mg daily  VEEG to monitor for seizures  Neurochecks every hour    CV:   No active issues     Pulm:  Diagnosis: Acute hypoxic respiratory failure secondary to failure protect airway  Intubated and sedated   Plan:  Goal > 92%; continuous pulse ox  Pulmonary toileting as needed  Respiratory protocol  Plan to extubate tomorrow 11/29    GI:   Bowel regimen:  MiraLAX 17 g daily  Bisacodyl suppository daily as needed    :   No active issues  High anion gap metabolic acidosis: Repeat lactate 1.2 on 11/28    F/E/N:   F: 75 cc/h Isolyte  E: Replete electrolytes as needed  N: NPO    Heme/Onc:   DVT Ppx: Subcutaneous heparin and SCDs    Endo:   No active issues    ID:   No active issues    MSK/Skin:   PT/OT  Repositioning as needed    Disposition: Critical care    History of Present Illness   Tim Alanis Jr. is a 24 y.o. with a history of seizures and methamphetamine use who presents from [facility] as a transfer for altered mental status and multiple seizures.  Patient was unresponsive upon arrival to the ED and was subsequently intubated for airway protection and was noted to have copious secretions in the oropharynx.  He " was given propofol and fentanyl after intubation and loaded with 3 g Keppra IV.  Patient's mother stated that she witnessed at least 3 episodes of seizure-like activity which was followed by agitation/confusion.  She noted that he did not return to his baseline mental status so she called EMS.  Lab workup found HAGMA and lactic acidosis secondary to status epilepticus.  This improved following IV fluids and increased ventilation.  His CT showed no acute intracranial abnormality.  Neurology recommended transfer to [facility].  He was also started on Precedex and required Versed bolus.  Did not require any vasopressors before coming to \[facility]\"".  Positive for amphetamines, benzodiazepines, THC.    Upon further chart review, the patient has had about 11 prior ED visits from 9/18/2023 for seizure episodes. On prior visits he has been positive for amphetamines as well. He is an ex-prisoner who relocated to this area around 2 years ago.  There has been a question to whether patient has been compliant with his antiepileptic drugs.    History obtained from chart review.  Review of Systems: Review of Systems not obtainable due to Clinical Condition    Historical Information   Past Medical History:  No date: Seizure (HCC) No past surgical history on file.   Current Outpatient Medications   Medication Instructions    levETIRAcetam (KEPPRA) 1,500 mg, Oral, Every 12 hours scheduled    zonisamide (ZONEGRAN) 300 mg, Oral, Daily    No Known Allergies   Social History     Tobacco Use    Smoking status: Every Day     Current packs/day: 1.00     Average packs/day: 1 pack/day for 5.0 years (5.0 ttl pk-yrs)     Types: Cigarettes    Smokeless tobacco: Never   Vaping Use    Vaping status: Never Used   Substance Use Topics    Alcohol use: Not Currently    Drug use: Yes     Types: Marijuana, Methamphetamines     Comment: per mother pt has history of meth amphetamine abuse    No family history on file.       Objective :                   Vitals I/O       Most Recent Min/Max in 24hrs   Temp (!) 95.6 °F (35.3 °C) Temp  Min: 95.6 °F (35.3 °C)  Max: 96.8 °F (36 °C)   Pulse 60 Pulse  Min: 60  Max: 122   Resp   Resp  Min: 14  Max: 28   /86 BP  Min: 114/64  Max: 187/99   O2 Sat 100 % SpO2  Min: 95 %  Max: 100 %    No intake or output data in the 24 hours ending 11/28/24 1612    Diet NPO    Invasive Monitoring           Physical Exam   Physical Exam  Eyes:      Comments: Pupils 2 mm equal and reactive to light.   Skin:     General: Skin is warm and dry.   HENT:      Head: Normocephalic.   Cardiovascular:      Rate and Rhythm: Normal rate and regular rhythm.      Pulses: Decreased pulses (Mildly diminished pulses).      Heart sounds: No murmur heard.     No friction rub. No gallop.   Musculoskeletal:      Right lower leg: No edema.      Left lower leg: No edema.   Abdominal: General: Bowel sounds are normal. There is no distension.      Palpations: Abdomen is soft.   Constitutional:       Appearance: He is well-developed.      Interventions: He is sedated and intubated.   Pulmonary:      Effort: He is intubated.      Breath sounds: No wheezing or rhonchi.   Neurological:      Comments: Unable to assess as patient is intubated and sedated.  Did not withdraw to pain in any extremity.          Diagnostic Studies        Lab Results: I have reviewed the following results:     Medications:  Scheduled PRN   chlorhexidine, 15 mL, Q12H DAYO          Continuous    multi-electrolyte, 75 mL/hr         Labs:   CBC    Recent Labs     11/28/24  1055   WBC 18.87*   HGB 15.4   HCT 50.0*        BMP    Recent Labs     11/28/24  1055   SODIUM 138   K 4.0      CO2 7*   AGAP 31*   BUN 12   CREATININE 1.27   CALCIUM 10.4*       Coags    Recent Labs     11/28/24  1055   INR 1.13        Additional Electrolytes  Recent Labs     11/28/24  1055   MG 2.6          Blood Gas    No recent results  Recent Labs     11/28/24  1240   PHVEN 7.291*   CWU5GBA 49.7   PO2VEN 38.6   QLW4HXP 23.4*    BEVEN -3.6   N0WABDO 66.6    LFTs  Recent Labs     11/28/24  1055   ALT 14   AST 18   ALKPHOS 72   ALB 5.3*   TBILI 0.51       Infectious  No recent results  Glucose  Recent Labs     11/28/24  1055   GLUC 139

## 2024-11-28 NOTE — RESPIRATORY THERAPY NOTE
RT Ventilator Management Note  Tim Alanis  24 y.o. male MRN: [id number]  Unit/Bed#: ICU 04 Encounter: [id number]      Daily Screen         11/28/2024  1100 11/28/2024  1620          Patient safety screen outcome:: Failed Failed (P)       Not Ready for Weaning due to:: Underline problem not resolved Underline problem not resolved;Recieving paralytics (P)                 Physical Exam:   Assessment Type: Assess only  General Appearance: Unresponsive  Respiratory Pattern: Assisted  Chest Assessment: Chest expansion symmetrical  Bilateral Breath Sounds: Coarse      Resp Comments: (P) pt tranfered from [facility] for neuro monitoring after seiures. Pt has no known pulm hx or meds at home. Will monitor via RCP for vent.

## 2024-11-28 NOTE — ASSESSMENT & PLAN NOTE
Patient in status epilepticus after at least 3 seizures resulting in intubation.  Unclear if patient takes his home 1.5 g twice daily Keppra and zonisamide 300 mg daily.  Urine was positive for amphetamines, benzodiazepines, THC.  Patient currently intubated on Precedex, propofol.  Suspect anion gap metabolic acidosis and lactic acidosis secondary to seizures.    Plan discussed with attending, Dr. Fonseca  Continue home Keppra 1.5 g twice daily and zonisamide 300 mg daily  Follow-up on zonisamide level  Video EEG  Continue to utilize sedation for seizure suppression  Optimize metabolic derangements  Remainder of care per primary team

## 2024-11-29 LAB
ALBUMIN SERPL BCG-MCNC: 3.9 G/DL (ref 3.5–5)
ALP SERPL-CCNC: 56 U/L (ref 34–104)
ALT SERPL W P-5'-P-CCNC: 10 U/L (ref 7–52)
AMMONIA PLAS-SCNC: 36 UMOL/L (ref 18–72)
ANION GAP SERPL CALCULATED.3IONS-SCNC: 13 MMOL/L (ref 4–13)
ANION GAP SERPL CALCULATED.3IONS-SCNC: 7 MMOL/L (ref 4–13)
AST SERPL W P-5'-P-CCNC: 22 U/L (ref 13–39)
BASOPHILS # BLD AUTO: 0.05 THOUSANDS/ΜL (ref 0–0.1)
BASOPHILS NFR BLD AUTO: 0 % (ref 0–1)
BILIRUB SERPL-MCNC: 0.53 MG/DL (ref 0.2–1)
BUN SERPL-MCNC: 17 MG/DL (ref 5–25)
BUN SERPL-MCNC: 20 MG/DL (ref 5–25)
CALCIUM SERPL-MCNC: 8.6 MG/DL (ref 8.4–10.2)
CALCIUM SERPL-MCNC: 9 MG/DL (ref 8.4–10.2)
CHLORIDE SERPL-SCNC: 104 MMOL/L (ref 96–108)
CHLORIDE SERPL-SCNC: 107 MMOL/L (ref 96–108)
CK SERPL-CCNC: 268 U/L (ref 39–308)
CO2 SERPL-SCNC: 22 MMOL/L (ref 21–32)
CO2 SERPL-SCNC: 25 MMOL/L (ref 21–32)
CREAT SERPL-MCNC: 2.22 MG/DL (ref 0.6–1.3)
CREAT SERPL-MCNC: 2.8 MG/DL (ref 0.6–1.3)
EOSINOPHIL # BLD AUTO: 0.06 THOUSAND/ΜL (ref 0–0.61)
EOSINOPHIL NFR BLD AUTO: 0 % (ref 0–6)
ERYTHROCYTE [DISTWIDTH] IN BLOOD BY AUTOMATED COUNT: 12.8 % (ref 11.6–15.1)
GFR SERPL CREATININE-BSD FRML MDRD: 30 ML/MIN/1.73SQ M
GFR SERPL CREATININE-BSD FRML MDRD: 40 ML/MIN/1.73SQ M
GLUCOSE SERPL-MCNC: 81 MG/DL (ref 65–140)
GLUCOSE SERPL-MCNC: 97 MG/DL (ref 65–140)
HCT VFR BLD AUTO: 39.1 % (ref 36.5–49.3)
HGB BLD-MCNC: 12.7 G/DL (ref 12–17)
IMM GRANULOCYTES # BLD AUTO: 0.07 THOUSAND/UL (ref 0–0.2)
IMM GRANULOCYTES NFR BLD AUTO: 1 % (ref 0–2)
INR PPP: 1 (ref 0.85–1.19)
LYMPHOCYTES # BLD AUTO: 1.21 THOUSANDS/ΜL (ref 0.6–4.47)
LYMPHOCYTES NFR BLD AUTO: 8 % (ref 14–44)
MAGNESIUM SERPL-MCNC: 2.4 MG/DL (ref 1.9–2.7)
MCH RBC QN AUTO: 30 PG (ref 26.8–34.3)
MCHC RBC AUTO-ENTMCNC: 32.5 G/DL (ref 31.4–37.4)
MCV RBC AUTO: 92 FL (ref 82–98)
MONOCYTES # BLD AUTO: 2.23 THOUSAND/ΜL (ref 0.17–1.22)
MONOCYTES NFR BLD AUTO: 15 % (ref 4–12)
NEUTROPHILS # BLD AUTO: 11.21 THOUSANDS/ΜL (ref 1.85–7.62)
NEUTS SEG NFR BLD AUTO: 76 % (ref 43–75)
NRBC BLD AUTO-RTO: 0 /100 WBCS
PHOSPHATE SERPL-MCNC: 5.1 MG/DL (ref 2.7–4.5)
PLATELET # BLD AUTO: 191 THOUSANDS/UL (ref 149–390)
PMV BLD AUTO: 11 FL (ref 8.9–12.7)
POTASSIUM SERPL-SCNC: 3.5 MMOL/L (ref 3.5–5.3)
POTASSIUM SERPL-SCNC: 3.6 MMOL/L (ref 3.5–5.3)
PROT SERPL-MCNC: 5.6 G/DL (ref 6.4–8.4)
PROTHROMBIN TIME: 13.5 SECONDS (ref 12.3–15)
RBC # BLD AUTO: 4.23 MILLION/UL (ref 3.88–5.62)
SODIUM SERPL-SCNC: 136 MMOL/L (ref 135–147)
SODIUM SERPL-SCNC: 142 MMOL/L (ref 135–147)
WBC # BLD AUTO: 14.83 THOUSAND/UL (ref 4.31–10.16)

## 2024-11-29 PROCEDURE — 85610 PROTHROMBIN TIME: CPT

## 2024-11-29 PROCEDURE — 83735 ASSAY OF MAGNESIUM: CPT

## 2024-11-29 PROCEDURE — 84100 ASSAY OF PHOSPHORUS: CPT

## 2024-11-29 PROCEDURE — 82140 ASSAY OF AMMONIA: CPT | Performed by: PSYCHIATRY & NEUROLOGY

## 2024-11-29 PROCEDURE — 80053 COMPREHEN METABOLIC PANEL: CPT

## 2024-11-29 PROCEDURE — 99232 SBSQ HOSP IP/OBS MODERATE 35: CPT | Performed by: PSYCHIATRY & NEUROLOGY

## 2024-11-29 PROCEDURE — 99291 CRITICAL CARE FIRST HOUR: CPT | Performed by: PSYCHIATRY & NEUROLOGY

## 2024-11-29 PROCEDURE — NC001 PR NO CHARGE: Performed by: PSYCHIATRY & NEUROLOGY

## 2024-11-29 PROCEDURE — 85025 COMPLETE CBC W/AUTO DIFF WBC: CPT

## 2024-11-29 PROCEDURE — 82550 ASSAY OF CK (CPK): CPT | Performed by: NURSE PRACTITIONER

## 2024-11-29 PROCEDURE — 80048 BASIC METABOLIC PNL TOTAL CA: CPT | Performed by: NURSE PRACTITIONER

## 2024-11-29 PROCEDURE — 95720 EEG PHY/QHP EA INCR W/VEEG: CPT | Performed by: PSYCHIATRY & NEUROLOGY

## 2024-11-29 RX ORDER — SODIUM CHLORIDE, SODIUM GLUCONATE, SODIUM ACETATE, POTASSIUM CHLORIDE, MAGNESIUM CHLORIDE, SODIUM PHOSPHATE, DIBASIC, AND POTASSIUM PHOSPHATE .53; .5; .37; .037; .03; .012; .00082 G/100ML; G/100ML; G/100ML; G/100ML; G/100ML; G/100ML; G/100ML
100 INJECTION, SOLUTION INTRAVENOUS CONTINUOUS
Status: DISCONTINUED | OUTPATIENT
Start: 2024-11-29 | End: 2024-12-01 | Stop reason: HOSPADM

## 2024-11-29 RX ORDER — HEPARIN SODIUM 5000 [USP'U]/ML
5000 INJECTION, SOLUTION INTRAVENOUS; SUBCUTANEOUS EVERY 8 HOURS SCHEDULED
Status: DISCONTINUED | OUTPATIENT
Start: 2024-11-29 | End: 2024-12-01 | Stop reason: HOSPADM

## 2024-11-29 RX ORDER — POTASSIUM CHLORIDE 1500 MG/1
40 TABLET, EXTENDED RELEASE ORAL ONCE
Status: COMPLETED | OUTPATIENT
Start: 2024-11-29 | End: 2024-11-29

## 2024-11-29 RX ADMIN — ZONISAMIDE 300 MG: 100 CAPSULE ORAL at 08:30

## 2024-11-29 RX ADMIN — SODIUM CHLORIDE, SODIUM GLUCONATE, SODIUM ACETATE, POTASSIUM CHLORIDE, MAGNESIUM CHLORIDE, SODIUM PHOSPHATE, DIBASIC, AND POTASSIUM PHOSPHATE 100 ML/HR: .53; .5; .37; .037; .03; .012; .00082 INJECTION, SOLUTION INTRAVENOUS at 21:43

## 2024-11-29 RX ADMIN — LEVETIRACETAM 1500 MG: 100 INJECTION, SOLUTION INTRAVENOUS at 21:43

## 2024-11-29 RX ADMIN — HEPARIN SODIUM 5000 UNITS: 5000 INJECTION, SOLUTION INTRAVENOUS; SUBCUTANEOUS at 21:43

## 2024-11-29 RX ADMIN — CHLORHEXIDINE GLUCONATE 0.12% ORAL RINSE 15 ML: 1.2 LIQUID ORAL at 21:43

## 2024-11-29 RX ADMIN — LEVETIRACETAM 1500 MG: 100 INJECTION, SOLUTION INTRAVENOUS at 08:30

## 2024-11-29 RX ADMIN — POTASSIUM CHLORIDE 40 MEQ: 1500 TABLET, EXTENDED RELEASE ORAL at 06:37

## 2024-11-29 NOTE — PROGRESS NOTES
"Progress Note - Critical Care/ICU   Name: Tim Alanis Jr. 24 y.o. male I MRN: [id number]  Unit/Bed#: ICU 04 I Date of Admission: 11/28/2024   Date of Service: 11/29/2024 I Hospital Day: 1      Assessment & Plan   Neuro:   Diagnosis: Status epilepticus  CT head without contrast 11/28/2024: \"No acute intracranial abnormality.\"  CT spine cervical without contrast 11/28/2024: \"No cervical spine fracture or traumatic malalignment.\"  CT chest abdomen pelvis without contrast 11/28/2024: \"No identifiable acute abnormality to account for the patient's clinical presentation.\"  Plan:  Sedation: Not requiring sedation  Neurology consulted, appreciate recommendations  Positive screen for methamphetamines, THC, benzodiazepines  Seizure precautions-restraints  Keppra 1500 mg every 12 hours  Zonisamide 300 mg daily  VEEG to monitor for seizures  Overnight 11/28 to 11/29: \"No seizures.  No epileptic discharges.  Mild encephalopathy.\"  Neurochecks every hour     CV:   No active issues      Pulm:  Diagnosis: Acute hypoxic respiratory failure secondary to failure protect airway  Self-extubated overnight              Plan:  Goal > 92%; continuous pulse ox     GI:   Bowel regimen:  MiraLAX 17 g daily  Bisacodyl suppository daily as needed     :   High anion gap metabolic acidosis secondary to seizure activity  Resolved  BALWINDER  Likely in the setting of breakdown products from seizure  Creatinine 2.2 to on 11/29  Continue to monitor and encourage p.o. intake     F/E/N:   F: None  E: Replete electrolytes as needed  N: Regular diet     Heme/Onc:   DVT Ppx: Subcutaneous heparin and SCDs     Endo:   No active issues     ID:   No active issues     MSK/Skin:   PT/OT  Repositioning as needed    Disposition: Critical care    ICU Core Measures     A: Assess, Prevent, and Manage Pain Has pain been assessed? Yes  Need for changes to pain regimen? No   B: Both SAT/SAT  N/A   C: Choice of Sedation RASS Goal: 0 Alert and Calm or N/A patient not on " sedation  Need for changes to sedation or analgesia regimen? NA   D: Delirium CAM-ICU: Negative   E: Early Mobility  Plan for early mobility? Yes   F: Family Engagement Plan for family engagement today? Yes       Review of Invasive Devices:    Iker Plan: Continue for accurate I/O monitoring for 48 hours        24 Hour Events : Patient self extubated overnight and sedation has been turned off.  Making obscene gestures to night team    Subjective   States he is doing well and is requesting to go home.  He denies any weakness, dizziness, chest pain, palpitations, shortness of breath, nausea, vomiting.    Objective :                   Vitals I/O      Most Recent Min/Max in 24hrs   Temp 98.4 °F (36.9 °C) Temp  Min: 95 °F (35 °C)  Max: 98.4 °F (36.9 °C)   Pulse 62 Pulse  Min: 58  Max: 122   Resp 14 Resp  Min: 8  Max: 28   /75 BP  Min: 87/47  Max: 187/99   O2 Sat 97 % SpO2  Min: 88 %  Max: 100 %      Intake/Output Summary (Last 24 hours) at 11/29/2024 0617  Last data filed at 11/29/2024 0400  Gross per 24 hour   Intake 617.96 ml   Output 970 ml   Net -352.04 ml       Diet Regular; Regular House    Invasive Monitoring           Physical Exam   Physical Exam  Eyes:      Extraocular Movements: Extraocular movements intact.   Skin:     General: Skin is warm and dry.   HENT:      Head: Normocephalic.   Cardiovascular:      Rate and Rhythm: Normal rate and regular rhythm.      Pulses: Normal pulses.      Heart sounds: No murmur heard.  Musculoskeletal:      Right lower leg: No edema.      Left lower leg: No edema.   Abdominal: General: Bowel sounds are normal. There is no distension.      Palpations: Abdomen is soft.      Tenderness: There is no abdominal tenderness.   Constitutional:       General: He is not in acute distress.     Appearance: He is well-developed. He is not toxic-appearing.      Interventions: He is not sedated and not intubated.  Pulmonary:      Effort: Pulmonary effort is normal. No accessory muscle  usage, respiratory distress or accessory muscle usage. He is not intubated.      Breath sounds: Normal breath sounds. No wheezing or rales.   Neurological:      General: No focal deficit present.      Mental Status: He is alert and oriented to person, place and time.      GCS: GCS eye subscore is 4. GCS verbal subscore is 5. GCS motor subscore is 6.      Cranial Nerves: Cranial nerves 2-12 are intact.      Sensory: Sensation is intact.      Motor: gross motor function is at baseline for patient. Strength full and intact in all extremities.          Diagnostic Studies        Lab Results: I have reviewed the following results:     Medications:  Scheduled PRN   chlorhexidine, 15 mL, Q12H DAYO  levETIRAcetam, 1,500 mg, Q12H DAYO  polyethylene glycol, 17 g, Daily  potassium chloride, 40 mEq, Once  zonisamide, 300 mg, Daily      bisacodyl, 10 mg, Daily PRN       Continuous          Labs:   CBC    Recent Labs     11/28/24 1826 11/29/24  0505   WBC 15.92* 14.83*   HGB 12.7 12.7   HCT 38.0 39.1    191     BMP    Recent Labs     11/28/24 1826 11/29/24  0505   SODIUM 138 142   K 3.8 3.6    107   CO2 24 22   AGAP 9 13   BUN 13 17   CREATININE 1.16 2.22*   CALCIUM 8.3* 9.0       Coags    Recent Labs     11/28/24  1055 11/29/24  0505   INR 1.13 1.00        Additional Electrolytes  Recent Labs     11/28/24  1055 11/29/24  0505   MG 2.6 2.4   PHOS  --  5.1*          Blood Gas    No recent results  Recent Labs     11/28/24 1826   PHVEN 7.340   MNI3PAR 46.3   PO2VEN 77.6*   UWQ3PYJ 24.4   BEVEN -1.6   P0ZAXYL 94.0*    LFTs  Recent Labs     11/28/24  1055 11/29/24  0505   ALT 14 10   AST 18 22   ALKPHOS 72 56   ALB 5.3* 3.9   TBILI 0.51 0.53       Infectious  No recent results  Glucose  Recent Labs     11/28/24  1055 11/28/24  1826 11/29/24  0505   GLUC 139 97 81

## 2024-11-29 NOTE — PROGRESS NOTES
Progress Note - Critical Care/ICU   Name: Tim Alanis Jr. 24 y.o. male I MRN: [id number]  Unit/Bed#: ICU 04 I Date of Admission: 11/28/2024   Date of Service: 11/29/2024 I Hospital Day: 1      Critical Care Interval Transfer Note:    Brief Hospital Summary:  24 y.o. M hx of polysubstance use and multiple ED visits for seizures who presents from [facility] as a transfer for altered mental status and multiple seizures. Patient was unresponsive upon arrival to the ED and was subsequently intubated for airway protection. Loaded with 3 g Keppra. Patient's mother stated that she witnessed at least 3 episodes of seizure-like activity which was followed by agitation/confusion. She noted that he did not return to his baseline mental status so she called EMS. Lab workup found HAGMA and lactic acidosis secondary to status epilepticus. CTH showed no acute intracranial abnormality. Neurology recommended transfer to [facility]. Required precedex, fentanyl and propofol gtt and multiple prop boluses for sedation. Positive for amphetamines, benzodiazepines, THC. Patient self extubated overnight on 11/29, and all sedation medications discontinued. He has had no seizure activity on EEG overnight since around 730 pm. His exam is nonfocal and is doing well now. Med/Surg is most appropriate for him. We will d/c EEG.    Barriers to discharge:   Pending clinical improvement  BALWINDER likely 2/2 to seizures     Consults: IP CONSULT TO CASE MANAGEMENT  IP CONSULT TO NEUROLOGY    Patient seen and evaluated by Critical Care today and deemed to be appropriate for transfer to Med Surg. Spoke to Dr. Hill from [facility] to accept transfer. Critical care can be contacted via SecureChat with any questions or concerns. Please use the Critical Care AP Role in Secure Chat for any provider inquires until the patient is transferred out of the ICU or until tomorrow at 0600.

## 2024-11-29 NOTE — PROGRESS NOTES
"Critical Care Services- Self Extubation Progress Note   Tim Alanis Jr. 24 y.o. male MRN: [id number]  Unit/Bed#: ICU 04 Encounter: [id number]    Date occurred: 11/28/2024   Time occurred (): 2220  Assigned Nurse: Gali Dean RN    Restraints: yes    Intubation Date: 11/28/2024  Vent settings:  Mode:                 Resp Rate (BPM): 14 BPM    VT (mL): 450    FIO2: 40%    PEEP (cmH20): 6    Weaning trial: no    Continuous medications:   dexmedetomidine, 0.1-0.7 mcg/kg/hr, Last Rate: Stopped (11/28/24 2218)  fentaNYL, 100 mcg/hr, Last Rate: Stopped (11/28/24 2219)  multi-electrolyte, 75 mL/hr, Last Rate: 75 mL/hr (11/28/24 1621)      Sedation HOLD: No  Prop gtt held after no seizure on vEEG. Fentanyl and Precedex gtt remainded infusion  If Yes- Date held: 11/ 28/ 24   Time held (): 2113    PRN medications:   bisacodyl, 10 mg, Daily PRN      Last date/ time () PRN sedation given: None    RASS goal: 0 Alert and Calm  Last documented RASS: -2    Did patient need reintubation: no  If NO what is their oxygen requirement: RA    Was the patient receiving therapy, testing, or procedure at time of extubation: yes  If YES, what type: vEEG    Was family notified: no  Who was notified: mother number listed not in service. Godfather listed no answer. Pt unaware of other numbers    Narrative of Events/Additional comments: Pt intubated for airway protection due to seizure. Tx to SLB. vEEG placed on pt. No seizures. Prop gtt weaned down. 2030 pt following commands. Propofol gtt off 2113. Remained on Precedex gtt/Fentanyl gtt. On reexamination 2220 when stated pt name he sat upright trying to pull ETT. 4 staff at bedside attempting to redirect patient. Pt continued to sit up forcefully and reached hand to ETT despite restraints in place and pulled ett out. Sats 96-98 on RA no stridor. Lungs clear.     Pt stated \"I couldn't f----g breathe and you all stood there looking at me.\"    Lili Hernandez, " CRNP

## 2024-11-29 NOTE — UTILIZATION REVIEW
NOTIFICATION OF INPATIENT ADMISSION      AUTHORIZATION REQUEST   SERVICING FACILITY:   [facility]  Address: [address] [place] [address]  Tax ID: [id number]  NPI: [id number] ATTENDING PROVIDER:  Attending Name and NPI#: Aileen Block Md [[id number]]  Address: [address] [place] [address]  Phone: [phone]   ADMISSION INFORMATION:  Place of Service: Inpatient [facility]  Place of Service Code: 21  Inpatient Admission Date/Time: 11/28/24  3:48 PM  Discharge Date/Time: No discharge date for patient encounter.  Admitting Diagnosis Code/Description:  seizure poss drug use     UTILIZATION REVIEW CONTACT:  Opal Summers, Utilization   Network Utilization Review Department  Phone: [phone]  Fax: [phone]  Email: [email or web address]  Contact for approvals/pending authorizations, clinical reviews, and discharge.     PHYSICIAN ADVISORY SERVICES:  Medical Necessity Denial & Ncnc-mg-Duab Review  Phone: [phone]  Fax: [phone]  Email: [email or web address]     DISCHARGE SUPPORT TEAM:  For Patients Discharge Needs & Updates  Phone: [phone] opt. [phone] Fax: [phone]  Email: [email or web address]

## 2024-11-29 NOTE — NURSING NOTE
"Pt became increasingly agitated upon assessment at 2215. Sat up and tried to pull at his ETT while mouthing \"I can't breathe\". This nurse and two other nurses tried to prevent him from pulling out his tube. CRNP at bedside trying to calm pt down. Pt continued to fight staff and pulled out his ETT. Vital signs stable. No signs of injury.   "

## 2024-11-29 NOTE — PROGRESS NOTES
Progress Note - Neurology   Name: Tim Alanis Jr. 24 y.o. male I MRN: [id number]  Unit/Bed#: ICU 04 I Date of Admission: 2024   Date of Service: 2024 I Hospital Day: 1     Assessment & Plan  Status epilepticus (HCC)  Patient in status epilepticus after at least 3 seizures resulting in intubation.  Unclear if patient takes his home 1.5 g twice daily Keppra and zonisamide 300 mg daily.  Urine was positive for amphetamines, benzodiazepines, THC.  Patient currently intubated on Precedex, propofol.  Suspect anion gap metabolic acidosis and lactic acidosis secondary to seizures. Resolved. Patient back to baseline.    Plan discussed with attending, Dr. Cervantes  Continue home Keppra 1.5 g twice daily and zonisamide 300 mg daily  Follow-up on zonisamide level  Video EEG okay to d/c  Patient does not drive, penndot form not needed  Remainder of care per primary team    Patient has neurology follow-up in outside system.      SUBJECTIVE     Patient was seen and examined. No acute events overnight. Self-extubated yesterday. No seizures on vEEG. He feels he is at his baseline and would like to go home. He confirms he is taking both of his ASMs. He does not drive.    Pertinent Negatives include: numbness, weakness, speech or visual changes     Review of Systems    OBJECTIVE     Patient ID: Tim Alanis Jr. is a 24 y.o. male.    Vitals:    24 0400 24 0500 24 0530 24 0600   BP: 123/73 126/79 133/75 121/77   Pulse: 62 60 62 56   Resp: 15 (!) 9 14 12   Temp: 98.4 °F (36.9 °C)      TempSrc: Oral      SpO2: 97% 97% 97% 97%   Weight:       Height:          Temperature:   Temp (24hrs), Av.2 °F (36.2 °C), Min:95 °F (35 °C), Max:98.4 °F (36.9 °C)    Temperature: 98.4 °F (36.9 °C)      Physical Exam  Eyes:      Extraocular Movements: EOM normal.      Pupils: Pupils are equal, round, and reactive to light.   Neurological:      Mental Status: He is oriented to person, place, and time.      Motor: Motor  strength is normal.  Psychiatric:         Speech: Speech normal.          Neurologic Exam     Mental Status   Oriented to person, place, and time.   Speech: speech is normal     Cranial Nerves     CN III, IV, VI   Pupils are equal, round, and reactive to light.  Extraocular motions are normal.     CN VII   Facial expression full, symmetric.     Motor Exam     Strength   Strength 5/5 throughout.     Sensory Exam   Light touch normal.            LABORATORY DATA     Labs: I have personally reviewed pertinent reports.    Results from last 7 days   Lab Units 11/29/24  0505 11/28/24  1826 11/28/24  1055   WBC Thousand/uL 14.83* 15.92* 18.87*   HEMOGLOBIN g/dL 12.7 12.7 15.4   HEMATOCRIT % 39.1 38.0 50.0*   PLATELETS Thousands/uL 191 199 295   SEGS PCT % 76* 73  --    MONO PCT % 15* 11 9   EOS PCT % 0 1 4      Results from last 7 days   Lab Units 11/29/24  0505 11/28/24  1826 11/28/24  1055   SODIUM mmol/L 142 138 138   POTASSIUM mmol/L 3.6 3.8 4.0   CHLORIDE mmol/L 107 105 100   CO2 mmol/L 22 24 7*   BUN mg/dL 17 13 12   CREATININE mg/dL 2.22* 1.16 1.27   CALCIUM mg/dL 9.0 8.3* 10.4*   ALK PHOS U/L 56  --  72   ALT U/L 10  --  14   AST U/L 22  --  18     Results from last 7 days   Lab Units 11/29/24  0505 11/28/24  1055   MAGNESIUM mg/dL 2.4 2.6     Results from last 7 days   Lab Units 11/29/24  0505   PHOSPHORUS mg/dL 5.1*      Results from last 7 days   Lab Units 11/29/24  0505 11/28/24  1055   INR  1.00 1.13     Results from last 7 days   Lab Units 11/28/24  1326   LACTIC ACID mmol/L 1.2           IMAGING & DIAGNOSTIC TESTING     Radiology Results: I have personally reviewed pertinent reports.      No orders to display       Other Diagnostic Testing: I have personally reviewed pertinent reports.      ACTIVE MEDICATIONS       Current Facility-Administered Medications:     bisacodyl (DULCOLAX) rectal suppository 10 mg, Daily PRN    chlorhexidine (PERIDEX) 0.12 % oral rinse 15 mL, Q12H DAYO    heparin (porcine)  subcutaneous injection 5,000 Units, Q8H DAYO    levETIRAcetam (KEPPRA) injection 1,500 mg, Q12H DAYO    polyethylene glycol (MIRALAX) packet 17 g, Daily    zonisamide (ZONEGRAN) capsule 300 mg, Daily    Prior to Admission medications    Medication Sig Start Date End Date Taking? Authorizing Provider   levETIRAcetam (KEPPRA) 500 mg tablet Take 3 tablets (1,500 mg total) by mouth every 12 (twelve) hours 4/29/24   Shakeel Alcala MD   zonisamide (ZONEGRAN) 100 mg capsule Take 3 capsules (300 mg total) by mouth daily 2/14/24 10/17/24  Darlene Crowe MD         VTE Pharmacologic Prophylaxis: VTE covered by:  heparin (porcine), Subcutaneous      VTE Mechanical Prophylaxis: sequential compression device    ======    I have discussed the patient's history, physical exam findings, assessment, and plan in detail with attending, Dr. Cervantes    Thank you for allowing me to participate in the care of your patient, Tim Alanis Jr..    Elisabeth Bobo MD  [facility] Neurology Residency, PGY-2

## 2024-11-29 NOTE — ASSESSMENT & PLAN NOTE
Patient in status epilepticus after at least 3 seizures resulting in intubation.  Unclear if patient takes his home 1.5 g twice daily Keppra and zonisamide 300 mg daily.  Urine was positive for amphetamines, benzodiazepines, THC.  Patient currently intubated on Precedex, propofol.  Suspect anion gap metabolic acidosis and lactic acidosis secondary to seizures. Resolved. Patient back to baseline.    Plan discussed with attending, Dr. Cervantes  Continue home Keppra 1.5 g twice daily and zonisamide 300 mg daily  Follow-up on zonisamide level  Video EEG okay to d/c  Patient does not drive, penndot form not needed  Remainder of care per primary team

## 2024-11-29 NOTE — CASE MANAGEMENT
Case Management Assessment & Discharge Planning Note    Patient name Tim Alanis Jr.  Location ICU 04/ICU 04 MRN [id number]  : 2000 Date 2024       Current Admission Date: 2024  Current Admission Diagnosis:Status epilepticus (HCC)  Patient Active Problem List    Diagnosis Date Noted Date Diagnosed    BALWINDER (acute kidney injury) (HCC) 10/07/2024     Polysubstance abuse (HCC) 10/07/2024     Status epilepticus (HCC) 10/06/2024     Breakthrough seizure (HCC) 2024     Epileptic seizure, generalized (HCC) 2023     Drug use 2023       LOS (days): 1  Geometric Mean LOS (GMLOS) (days):   Days to GMLOS:     OBJECTIVE:    Risk of Unplanned Readmission Score: 23.83         Current admission status: Inpatient       Preferred Pharmacy:   [id number] - [place], [place] - [address] [facility]  [address]  [place] [address]  Phone: [phone] Fax: [phone]    [id number] [address] [facility]  [address] [facility] [address]  Phone: [phone] Fax: [phone]    Primary Care Provider: No primary care provider on file.    Primary Insurance: [employer]  Secondary Insurance:     ASSESSMENT:  Active Health Care Proxies       Meghna Cardenas [facility] Care Representative - Mother   Primary Phone: [phone] (Mobile)               Readmission Root Cause  30 Day Readmission: No    Patient Information  Admitted from:: Home  Mental Status: Alert  During Assessment patient was accompanied by: Not accompanied during assessment  Assessment information provided by:: Patient  Primary Caregiver: Self  Support Systems: Self, Parent, Friend, Friends/neighbors, Family members  County of Residence: [facility]  What city do you live in?: [place]  Home entry access options. Select all that apply.: Stairs  Do the steps have railings?: Yes  Type of Current Residence: [address]  Upon entering residence, is there a bedroom on the  main floor (no further steps)?: No  A bedroom is located on the following floor levels of residence (select all that apply):: 2nd Floor  Upon entering residence, is there a bathroom on the main floor (no further steps)?: No  Indicate which floors of current residence have a bathroom (select all the apply):: 2nd Floor  Number of steps to 2nd floor from main floor: One Flight  Living Arrangements: Lives w/ Parent(s)  Is patient a ?: No    Activities of Daily Living Prior to Admission  Functional Status: Independent  Completes ADLs independently?: Yes  Ambulates independently?: Yes  Does patient use assisted devices?: No  Does patient currently own DME?: No  Does patient have a history of Outpatient Therapy (PT/OT)?: No  Does the patient have a history of Short-Term Rehab?: No  Does patient have a history of HHC?: No  Does patient currently have HHC?: No    Patient Information Continued  Income Source: Unemployed  Does patient have prescription coverage?: No (pt unsure)  Does patient receive dialysis treatments?: No  Does patient have a history of substance abuse?: Yes  Historical substance use preference: Marijuana (denied chart reports Marijuana)  History of Withdrawal Symptoms: Denies past symptoms  Is patient currently in treatment for substance abuse?: Not appropriate at this time to discuss.  Does patient have a history of Mental Health Diagnosis?: No    Means of Transportation  Means of Transport to Appts:: Family transport    Social Determinants of Health (SDOH)      Flowsheet Row Most Recent Value   Housing Stability    In the last 12 months, was there a time when you were not able to pay the mortgage or rent on time? N   At any time in the past 12 months, were you homeless or living in a shelter (including now)? N   Transportation Needs    In the past 12 months, has lack of transportation kept you from medical appointments or from getting medications? no   In the past 12 months, has lack of  transportation kept you from meetings, work, or from getting things needed for daily living? No   Food Insecurity    Within the past 12 months, you worried that your food would run out before you got the money to buy more. Never true   Within the past 12 months, the food you bought just didn't last and you didn't have money to get more. Never true   Utilities    In the past 12 months has the electric, gas, oil, or water company threatened to shut off services in your home? No        DISCHARGE DETAILS:    Discharge planning discussed with:: patient  Freedom of Choice: Yes  Comments - Freedom of Choice: Discussed FOC  CM reviewed d/c planning process including the following: identifying help at home, patient preference for d/c planning needs, Discharge Lounge, [facility] Meds to Bed program, availability of treatment team to discuss questions or concerns patient and/or family may have regarding understanding medications and recognizing signs and symptoms once discharged.  CM also encouraged patient to follow up with all recommended appointments after discharge. Patient advised of importance for patient and family to participate in managing patient’s medical well being.     This CM introduced self and role to pt. Pt lives in 2 story home with parents, bed and bath second level, one flight to reach, pt owns no DME, No PT/OT history, unemployed, in the process of establishing Social security benefits, and does not drive. Pt reports prior to admission he managed ADLs/IADLs independently. Pt is supported by family

## 2024-11-29 NOTE — UTILIZATION REVIEW
Initial Clinical Review    Admission: Date/Time/Statement:   Admission Orders (From admission, onward)       Ordered        11/28/24 1609  Inpatient Admission  Once                          Orders Placed This Encounter   Procedures    Inpatient Admission     Standing Status:   Standing     Number of Occurrences:   1     Level of Care:   Critical Care [15]     Estimated length of stay:   More than 2 Midnights     Certification:   I certify that inpatient services are medically necessary for this patient for a duration of greater than two midnights. See H&P and MD Progress Notes for additional information about the patient's course of treatment.     Initial Presentation: 24 y.o. male presents as a transfer from ED [facility] to [facility] with c/o of AMS and multiple witnessed seizures and unresponsive.  On arrival he was already intubated and had Keppra IV Load 3 GM.  Questionable AED compliance.  PMH: seizures, Meth use.  Labs - UDS + meth, benzos, THC, initial lactic acidosis has been corrected prior to arrival.  Imaging - negative for acute disease.  On exam he is intubated and sedated.  Admitted to INPATIENT status to Critical Care with status epilepticus, severe encephalopathy, illicit drug use, lactic acidosis, ammonia elevation - PLAN: vEEG, sedation with Propofol, Precedex, Fentanyl, ventilation, IV fluids, seizure prec, IV Keppra 1500 mg q 12 hr, Zonisamide daily, hourly neuro checks, restraints, Neuro consult, plan to extubate on 11/29, bowel regimen, NPO. Last evening pt pulled out ETT.  He was in restraints and agitated at the time surrounded by staff. He had good sats post extubation and lungs were clear.      11/28 Neuro Consult - c/f status epilepticus, intubated, loaded with IV Keppra, UDS +, h/o AED noncompliance, vEEG, if not ongoing seizures can wean Propofol, r/o infection    Date: 11/29   Day 2:   Status epilepticus, severe encephalopathy, illicit drug use, lactic acidosis, ammonia elevation  -  pt remains off ventilator and off sedation.  He is doing well, requesting to go home today, no weakness, no seizure activity noted. On exam no acute distress, normal breath sounds, GCS 15, no neuro deficits.  WBC trending down, creat elevated.  VS stable.        Scheduled Medications:  chlorhexidine, 15 mL, Mouth/Throat, Q12H DAYO  heparin (porcine), 5,000 Units, Subcutaneous, Q8H DAYO  levETIRAcetam, 1,500 mg, Intravenous, Q12H DAYO  polyethylene glycol, 17 g, Oral, Daily  zonisamide, 300 mg, Oral, Daily      Continuous IV Infusions:     PRN Meds:  bisacodyl, 10 mg, Rectal, Daily PRN        Weight (last 2 days)       Date/Time Weight    11/28/24 1600 80.9 (178.35)            Vital Signs (last 3 days)       Date/Time Temp Pulse Resp BP MAP (mmHg) SpO2 Wilber Coma Scale Score Pain    11/29/24 0600 -- 56 12 121/77 93 97 % 15 --    11/29/24 0530 -- 62 14 133/75 91 97 % -- --    11/29/24 0500 -- 60 9 126/79 95 97 % 15 --    11/29/24 0400 98.4 °F (36.9 °C) 62 15 123/73 89 97 % 15 --    11/29/24 0300 -- 70 13 124/70 87 97 % 15 --    11/29/24 0200 -- 74 15 124/59 79 95 % 15 --    11/29/24 0100 -- 72 16 118/66 87 94 % 15 --    11/29/24 0030 -- 76 15 117/69 86 92 % -- --    11/29/24 0000 98.1 °F (36.7 °C) 72 8 109/66 83 93 % 15 --    11/28/24 2300 -- 72 12 108/63 79 88 % 15 --    11/28/24 2230 -- 68 12 103/59 73 92 % -- --    11/28/24 2200 97.9 °F (36.6 °C) 62 13 95/58 71 99 % 11 --    11/28/24 2130 97.9 °F (36.6 °C) 58 13 94/58 70 100 % -- --    11/28/24 2100 97.5 °F (36.4 °C) 62 13 90/52 69 100 % 9 --    11/28/24 2030 97.5 °F (36.4 °C) 60 13 91/55 67 100 % -- --    11/28/24 2000 97.9 °F (36.6 °C) 70 14 87/47 68 100 % 9 No Pain    11/28/24 1900 97.9 °F (36.6 °C) 68 13 87/47 65 99 % 3 --    11/28/24 1800 96.4 °F (35.8 °C) 68 13 95/52 75 100 % 3 --    11/28/24 1700 95 °F (35 °C) 62 13 102/58 76 100 % 3 --    11/28/24 1600 95.6 °F (35.3 °C) 60 14 127/86 100 100 % 3 --          Pertinent Labs/Diagnostic Test Results:    Radiology:    11/28 CT head - No acute intracranial abnormality.     11/28 CT C spine - No cervical spine fracture or traumatic malalignment.     11/28 CT CAP - No identifiable acute abnormality to account for the patient's clinical presentation.     Cardiology:  No orders to display     GI:  No orders to display       Results from last 7 days   Lab Units 11/28/24 2044   SARS-COV-2  Negative     Results from last 7 days   Lab Units 11/29/24  0505 11/28/24 1826 11/28/24  1055   WBC Thousand/uL 14.83* 15.92* 18.87*   HEMOGLOBIN g/dL 12.7 12.7 15.4   HEMATOCRIT % 39.1 38.0 50.0*   PLATELETS Thousands/uL 191 199 295   TOTAL NEUT ABS Thousands/µL 11.21* 11.61*  --          Results from last 7 days   Lab Units 11/29/24  0505 11/28/24 1826 11/28/24  1055   SODIUM mmol/L 142 138 138   POTASSIUM mmol/L 3.6 3.8 4.0   CHLORIDE mmol/L 107 105 100   CO2 mmol/L 22 24 7*   ANION GAP mmol/L 13 9 31*   BUN mg/dL 17 13 12   CREATININE mg/dL 2.22* 1.16 1.27   EGFR ml/min/1.73sq m 40 88 79   CALCIUM mg/dL 9.0 8.3* 10.4*   MAGNESIUM mg/dL 2.4  --  2.6   PHOSPHORUS mg/dL 5.1*  --   --      Results from last 7 days   Lab Units 11/29/24  0505 11/28/24 1826 11/28/24  1055   AST U/L 22  --  18   ALT U/L 10  --  14   ALK PHOS U/L 56  --  72   TOTAL PROTEIN g/dL 5.6*  --  7.6   ALBUMIN g/dL 3.9  --  5.3*   TOTAL BILIRUBIN mg/dL 0.53  --  0.51   BILIRUBIN DIRECT mg/dL  --   --  0.11   AMMONIA umol/L 36 50 95*         Results from last 7 days   Lab Units 11/29/24  0505 11/28/24 1826 11/28/24  1055   GLUCOSE RANDOM mg/dL 81 97 139     Results from last 7 days   Lab Units 11/28/24  1826 11/28/24  1240 11/28/24  1106   PH FRANCY  7.340 7.291* 7.172*   PCO2 FRANCY mm Hg 46.3 49.7 34.6*   PO2 FRANCY mm Hg 77.6* 38.6 125.8*   HCO3 FRANCY mmol/L 24.4 23.4* 12.4*   BASE EXC FRANCY mmol/L -1.6 -3.6 -15.0   O2 CONTENT FRANCY ml/dL 18.1 13.3 20.6   O2 HGB, VENOUS % 94.0* 66.6 93.2*         Results from last 7 days   Lab Units 11/28/24  1055   CK TOTAL U/L 141              Results from last 7 days   Lab Units 11/29/24  0505 11/28/24  1055   PROTIME seconds 13.5 14.9   INR  1.00 1.13     Results from last 7 days   Lab Units 11/28/24  1826   TSH 3RD GENERATON uIU/mL 1.064         Results from last 7 days   Lab Units 11/28/24  1326 11/28/24  1055   LACTIC ACID mmol/L 1.2 17.0*     Results from last 7 days   Lab Units 11/28/24  1106   CLARITY UA  Clear   COLOR UA  Yellow   SPEC GRAV UA  >=1.030   PH UA  5.5   GLUCOSE UA mg/dl Negative   KETONES UA mg/dl Negative   BLOOD UA  Moderate*   PROTEIN UA mg/dl Negative   NITRITE UA  Negative   BILIRUBIN UA  Negative   UROBILINOGEN UA E.U./dl 0.2   LEUKOCYTES UA  Negative   WBC UA /hpf 0-1   RBC UA /hpf 2-4   BACTERIA UA /hpf Occasional   EPITHELIAL CELLS WET PREP /hpf Occasional     Results from last 7 days   Lab Units 11/28/24  2044   INFLUENZA A PCR  Negative   INFLUENZA B PCR  Negative   RSV PCR  Negative         Results from last 7 days   Lab Units 11/28/24  1106   AMPH/METH  Positive*   BARBITURATE UR  Negative   BENZODIAZEPINE UR  Positive*   COCAINE UR  Negative   METHADONE URINE  Negative   OPIATE UR  Negative   PCP UR  Negative   THC UR  Positive*     Results from last 7 days   Lab Units 11/28/24  1055   ETHANOL LVL mg/dL <10   ACETAMINOPHEN LVL ug/mL <2*   SALICYLATE LVL mg/dL <5     Past Medical History:   Diagnosis Date    Seizure (HCC)      Present on Admission:   Status epilepticus (HCC)      Admitting Diagnosis: seizure poss drug use  Age/Sex: 24 y.o. male    Network Utilization Review Department  ATTENTION: Please call with any questions or concerns to [phone] and carefully listen to the prompts so that you are directed to the right person. All voicemails are confidential.   For Discharge needs, contact Care Management DC Support Team at [phone] opt. 2  Send all requests for admission clinical reviews, approved or denied determinations and any other requests to dedicated fax number below belonging to the campus  where the patient is receiving treatment. List of dedicated fax numbers for the Facilities:  FACILITY NAME UR FAX NUMBER   ADMISSION DENIALS (Administrative/Medical Necessity) [phone]   DISCHARGE SUPPORT TEAM (NETWORK) [phone]   PARENT CHILD HEALTH (Maternity/NICU/Pediatrics) [phone]   [facility] [phone]   [facility] [phone]   [facility] [phone]   [facility] [phone]   [facility] [phone]   [facility] [phone]   [facility] [phone]   [facility] [phone]   [facility] [phone]   [facility] [phone]   [facility] [phone]   [facility] [phone]

## 2024-11-30 LAB
ANION GAP SERPL CALCULATED.3IONS-SCNC: 8 MMOL/L (ref 4–13)
BASOPHILS # BLD AUTO: 0.03 THOUSANDS/ΜL (ref 0–0.1)
BASOPHILS NFR BLD AUTO: 0 % (ref 0–1)
BUN SERPL-MCNC: 19 MG/DL (ref 5–25)
CALCIUM SERPL-MCNC: 8.6 MG/DL (ref 8.4–10.2)
CHLORIDE SERPL-SCNC: 104 MMOL/L (ref 96–108)
CO2 SERPL-SCNC: 26 MMOL/L (ref 21–32)
CREAT SERPL-MCNC: 2.74 MG/DL (ref 0.6–1.3)
EOSINOPHIL # BLD AUTO: 0.17 THOUSAND/ΜL (ref 0–0.61)
EOSINOPHIL NFR BLD AUTO: 2 % (ref 0–6)
ERYTHROCYTE [DISTWIDTH] IN BLOOD BY AUTOMATED COUNT: 12.7 % (ref 11.6–15.1)
GFR SERPL CREATININE-BSD FRML MDRD: 31 ML/MIN/1.73SQ M
GLUCOSE SERPL-MCNC: 94 MG/DL (ref 65–140)
HCT VFR BLD AUTO: 39.7 % (ref 36.5–49.3)
HGB BLD-MCNC: 13.1 G/DL (ref 12–17)
IMM GRANULOCYTES # BLD AUTO: 0.03 THOUSAND/UL (ref 0–0.2)
IMM GRANULOCYTES NFR BLD AUTO: 0 % (ref 0–2)
LYMPHOCYTES # BLD AUTO: 1.91 THOUSANDS/ΜL (ref 0.6–4.47)
LYMPHOCYTES NFR BLD AUTO: 18 % (ref 14–44)
MAGNESIUM SERPL-MCNC: 2.4 MG/DL (ref 1.9–2.7)
MCH RBC QN AUTO: 30.4 PG (ref 26.8–34.3)
MCHC RBC AUTO-ENTMCNC: 33 G/DL (ref 31.4–37.4)
MCV RBC AUTO: 92 FL (ref 82–98)
MONOCYTES # BLD AUTO: 1.46 THOUSAND/ΜL (ref 0.17–1.22)
MONOCYTES NFR BLD AUTO: 14 % (ref 4–12)
NEUTROPHILS # BLD AUTO: 6.89 THOUSANDS/ΜL (ref 1.85–7.62)
NEUTS SEG NFR BLD AUTO: 66 % (ref 43–75)
NRBC BLD AUTO-RTO: 0 /100 WBCS
PHOSPHATE SERPL-MCNC: 4.5 MG/DL (ref 2.7–4.5)
PLATELET # BLD AUTO: 203 THOUSANDS/UL (ref 149–390)
PMV BLD AUTO: 11.4 FL (ref 8.9–12.7)
POTASSIUM SERPL-SCNC: 3.5 MMOL/L (ref 3.5–5.3)
RBC # BLD AUTO: 4.31 MILLION/UL (ref 3.88–5.62)
SODIUM SERPL-SCNC: 138 MMOL/L (ref 135–147)
WBC # BLD AUTO: 10.49 THOUSAND/UL (ref 4.31–10.16)

## 2024-11-30 PROCEDURE — 85025 COMPLETE CBC W/AUTO DIFF WBC: CPT

## 2024-11-30 PROCEDURE — 99232 SBSQ HOSP IP/OBS MODERATE 35: CPT | Performed by: INTERNAL MEDICINE

## 2024-11-30 PROCEDURE — 84100 ASSAY OF PHOSPHORUS: CPT

## 2024-11-30 PROCEDURE — 80048 BASIC METABOLIC PNL TOTAL CA: CPT

## 2024-11-30 PROCEDURE — 83735 ASSAY OF MAGNESIUM: CPT

## 2024-11-30 RX ADMIN — HEPARIN SODIUM 5000 UNITS: 5000 INJECTION, SOLUTION INTRAVENOUS; SUBCUTANEOUS at 13:27

## 2024-11-30 RX ADMIN — SODIUM CHLORIDE, SODIUM GLUCONATE, SODIUM ACETATE, POTASSIUM CHLORIDE, MAGNESIUM CHLORIDE, SODIUM PHOSPHATE, DIBASIC, AND POTASSIUM PHOSPHATE 100 ML/HR: .53; .5; .37; .037; .03; .012; .00082 INJECTION, SOLUTION INTRAVENOUS at 16:47

## 2024-11-30 RX ADMIN — LEVETIRACETAM 1500 MG: 100 INJECTION, SOLUTION INTRAVENOUS at 21:34

## 2024-11-30 RX ADMIN — ZONISAMIDE 300 MG: 100 CAPSULE ORAL at 11:20

## 2024-11-30 RX ADMIN — SODIUM CHLORIDE, SODIUM GLUCONATE, SODIUM ACETATE, POTASSIUM CHLORIDE, MAGNESIUM CHLORIDE, SODIUM PHOSPHATE, DIBASIC, AND POTASSIUM PHOSPHATE 100 ML/HR: .53; .5; .37; .037; .03; .012; .00082 INJECTION, SOLUTION INTRAVENOUS at 07:45

## 2024-11-30 RX ADMIN — HEPARIN SODIUM 5000 UNITS: 5000 INJECTION, SOLUTION INTRAVENOUS; SUBCUTANEOUS at 21:34

## 2024-11-30 RX ADMIN — HEPARIN SODIUM 5000 UNITS: 5000 INJECTION, SOLUTION INTRAVENOUS; SUBCUTANEOUS at 05:08

## 2024-11-30 RX ADMIN — CHLORHEXIDINE GLUCONATE 0.12% ORAL RINSE 15 ML: 1.2 LIQUID ORAL at 21:34

## 2024-11-30 RX ADMIN — LEVETIRACETAM 1500 MG: 100 INJECTION, SOLUTION INTRAVENOUS at 08:36

## 2024-11-30 NOTE — PLAN OF CARE
Problem: INFECTION - ADULT  Goal: Absence or prevention of progression during hospitalization  Description: INTERVENTIONS:  - Assess and monitor for signs and symptoms of infection  - Monitor lab/diagnostic results  - Monitor all insertion sites, i.e. indwelling lines, tubes, and drains  - Monitor endotracheal if appropriate and nasal secretions for changes in amount and color  - [place] appropriate cooling/warming therapies per order  - Administer medications as ordered  - Instruct and encourage patient and family to use good hand hygiene technique  - Identify and instruct in appropriate isolation precautions for identified infection/condition  Outcome: Progressing     Problem: DISCHARGE PLANNING  Goal: Discharge to home or other facility with appropriate resources  Description: INTERVENTIONS:  - Identify barriers to discharge w/patient and caregiver  - Arrange for needed discharge resources and transportation as appropriate  - Identify discharge learning needs (meds, wound care, etc.)  - Arrange for interpretive services to assist at discharge as needed  - Refer to Case Management Department for coordinating discharge planning if the patient needs post-hospital services based on physician/advanced practitioner order or complex needs related to functional status, cognitive ability, or social support system  Outcome: Progressing

## 2024-11-30 NOTE — PLAN OF CARE
Problem: PAIN - ADULT  Goal: Verbalizes/displays adequate comfort level or baseline comfort level  Description: Interventions:  - Encourage patient to monitor pain and request assistance  - Assess pain using appropriate pain scale  - Administer analgesics based on type and severity of pain and evaluate response  - Implement non-pharmacological measures as appropriate and evaluate response  - Consider cultural and social influences on pain and pain management  - Notify physician/advanced practitioner if interventions unsuccessful or patient reports new pain  Outcome: Progressing     Problem: INFECTION - ADULT  Goal: Absence or prevention of progression during hospitalization  Description: INTERVENTIONS:  - Assess and monitor for signs and symptoms of infection  - Monitor lab/diagnostic results  - Monitor all insertion sites, i.e. indwelling lines, tubes, and drains  - Monitor endotracheal if appropriate and nasal secretions for changes in amount and color  - [place] appropriate cooling/warming therapies per order  - Administer medications as ordered  - Instruct and encourage patient and family to use good hand hygiene technique  - Identify and instruct in appropriate isolation precautions for identified infection/condition  Outcome: Progressing     Problem: SAFETY ADULT  Goal: Patient will remain free of falls  Description: INTERVENTIONS:  - Educate patient/family on patient safety including physical limitations  - Instruct patient to call for assistance with activity   - Consult OT/PT to assist with strengthening/mobility   - Keep Call bell within reach  - Keep bed low and locked with side rails adjusted as appropriate  - Keep care items and personal belongings within reach  - Initiate and maintain comfort rounds  - Make Fall Risk Sign visible to staff  - Offer Toileting every  Hours, in advance of need  - Initiate/Maintain alarm  - Obtain necessary fall risk management equipment:   - Apply yellow socks and  bracelet for high fall risk patients  - Consider moving patient to room near nurses station  Outcome: Progressing  Goal: Maintain or return to baseline ADL function  Description: INTERVENTIONS:  -  Assess patient's ability to carry out ADLs; assess patient's baseline for ADL function and identify physical deficits which impact ability to perform ADLs (bathing, care of mouth/teeth, toileting, grooming, dressing, etc.)  - Assess/evaluate cause of self-care deficits   - Assess range of motion  - Assess patient's mobility; develop plan if impaired  - Assess patient's need for assistive devices and provide as appropriate  - Encourage maximum independence but intervene and supervise when necessary  - Involve family in performance of ADLs  - Assess for home care needs following discharge   - Consider OT consult to assist with ADL evaluation and planning for discharge  - Provide patient education as appropriate  Outcome: Progressing  Goal: Maintains/Returns to pre admission functional level  Description: INTERVENTIONS:  - Perform AM-PAC 6 Click Basic Mobility/ Daily Activity assessment daily.  - Set and communicate daily mobility goal to care team and patient/family/caregiver.   - Collaborate with rehabilitation services on mobility goals if consulted  - Perform Range of Motion  times a day.  - Reposition patient every  hours.  - Dangle patient  times a day  - Stand patient  times a day  - Ambulate patient  times a day  - Out of bed to chair  times a day   - Out of bed for meals  times a day  - Out of bed for toileting  - Record patient progress and toleration of activity level   Outcome: Progressing

## 2024-11-30 NOTE — ASSESSMENT & PLAN NOTE
Secondary to status epilepticus, severe lactic acidosis, muscle breakdown products, transient hypotension  Peaked at creatinine 2.8 11/29, improved to 2.7 today continue IV fluids  Patient remain inpatient for daily renal function monitoring  , Hypotension

## 2024-11-30 NOTE — PROGRESS NOTES
Progress Note - Hospitalist   Name: Tim Alanis  24 y.o. male I MRN: [id number]  [facility] I Date of Admission: 11/28/2024   Date of Service: 11/30/2024 I Hospital Day: 2    Assessment & Plan  Drug use  UDS positive for amphetamines, benzodiazepines, THC  Counseled regarding drug use  Status epilepticus (HCC)  Secondary to amphetamine use  Cont AED per neurology  Seizure precautions  Outpatient epileptologist follow-up  BALWINDER (acute kidney injury) (HCC)  Secondary to status epilepticus, severe lactic acidosis, muscle breakdown products, transient hypotension  Peaked at creatinine 2.8 11/29, improved to 2.7 today continue IV fluids  Patient remain inpatient for daily renal function monitoring  , Hypotension    VTE Pharmacologic Prophylaxis:   Low Risk (Score 0-2) - Encourage Ambulation.    Mobility:   Basic Mobility Inpatient Raw Score: 18  JH-HLM Goal: 6: Walk 10 steps or more  JH-HLM Achieved: 2: Bed activities/Dependent transfer  JH-HLM Goal achieved. Continue to encourage appropriate mobility.    Patient Centered Rounds: I performed bedside rounds with nursing staff today.   Discussions with Specialists or Other Care Team Provider: janeth    Education and Discussions with Family / Patient: Patient declined call to .     Current Length of Stay: 2 day(s)  Current Patient Status: Inpatient   Certification Statement: The patient will continue to require additional inpatient hospital stay due to renal function monitoring  Discharge Plan: Anticipate discharge in 24-48 hrs to home.    Code Status: Level 1 - Full Code    Subjective   Seen and examined at bedside.  Feeling well without complaints.  Asking regarding timing of discharge.    Objective :  Temp:  [98 °F (36.7 °C)-98.7 °F (37.1 °C)] 98.2 °F (36.8 °C)  HR:  [52-74] 74  BP: (106-138)/(58-82) 123/79  Resp:  [12-25] 16  SpO2:  [97 %-99 %] 99 %  O2 Device: None (Room air)    Body mass index is 24.19 kg/m².     Input and Output Summary  (last 24 hours):   No intake or output data in the 24 hours ending 11/30/24 1614    Physical Exam  Constitutional:       General: He is not in acute distress.     Appearance: Normal appearance.      Comments: Young male well-appearing   HENT:      Mouth/Throat:      Mouth: Mucous membranes are moist.   Cardiovascular:      Rate and Rhythm: Normal rate and regular rhythm.   Pulmonary:      Effort: Pulmonary effort is normal. No respiratory distress.   Abdominal:      General: Abdomen is flat.   Skin:     General: Skin is warm.   Neurological:      General: No focal deficit present.      Mental Status: He is alert and oriented to person, place, and time.      Motor: No weakness.           Lines/Drains:              Lab Results: I have reviewed the following results:   Results from last 7 days   Lab Units 11/30/24  0531   WBC Thousand/uL 10.49*   HEMOGLOBIN g/dL 13.1   HEMATOCRIT % 39.7   PLATELETS Thousands/uL 203   SEGS PCT % 66   LYMPHO PCT % 18   MONO PCT % 14*   EOS PCT % 2     Results from last 7 days   Lab Units 11/30/24  0531 11/29/24  1940 11/29/24  0505   SODIUM mmol/L 138   < > 142   POTASSIUM mmol/L 3.5   < > 3.6   CHLORIDE mmol/L 104   < > 107   CO2 mmol/L 26   < > 22   BUN mg/dL 19   < > 17   CREATININE mg/dL 2.74*   < > 2.22*   ANION GAP mmol/L 8   < > 13   CALCIUM mg/dL 8.6   < > 9.0   ALBUMIN g/dL  --   --  3.9   TOTAL BILIRUBIN mg/dL  --   --  0.53   ALK PHOS U/L  --   --  56   ALT U/L  --   --  10   AST U/L  --   --  22   GLUCOSE RANDOM mg/dL 94   < > 81    < > = values in this interval not displayed.     Results from last 7 days   Lab Units 11/29/24  0505   INR  1.00             Results from last 7 days   Lab Units 11/28/24  1326 11/28/24  1055   LACTIC ACID mmol/L 1.2 17.0*       Recent Cultures (last 7 days):         Imaging Results Review: No pertinent imaging studies reviewed.  Other Study Results Review: No additional pertinent studies reviewed.    Last 24 Hours Medication List:     Current  Facility-Administered Medications:     bisacodyl (DULCOLAX) rectal suppository 10 mg, Daily PRN    chlorhexidine (PERIDEX) 0.12 % oral rinse 15 mL, Q12H DAYO    heparin (porcine) subcutaneous injection 5,000 Units, Q8H DAYO    levETIRAcetam (KEPPRA) injection 1,500 mg, Q12H DAYO    multi-electrolyte (PLASMALYTE-A/ISOLYTE-S PH 7.4) IV solution, Continuous, Last Rate: 100 mL/hr (11/30/24 0769)    polyethylene glycol (MIRALAX) packet 17 g, Daily    zonisamide (ZONEGRAN) capsule 300 mg, Daily    Administrative Statements   Today, Patient Was Seen By: Rylan Cochran MD  I have spent a total time of 25 minutes in caring for this patient on the day of the visit/encounter including Communicating with other healthcare professionals .    **Please Note: This note may have been constructed using a voice recognition system.**

## 2024-11-30 NOTE — ASSESSMENT & PLAN NOTE
Secondary to amphetamine use  Cont AED per neurology  Seizure precautions  Outpatient epileptologist follow-up

## 2024-12-01 VITALS
RESPIRATION RATE: 16 BRPM | HEART RATE: 58 BPM | HEIGHT: 72 IN | SYSTOLIC BLOOD PRESSURE: 131 MMHG | WEIGHT: 178.35 LBS | OXYGEN SATURATION: 98 % | TEMPERATURE: 97.8 F | DIASTOLIC BLOOD PRESSURE: 93 MMHG | BODY MASS INDEX: 24.16 KG/M2

## 2024-12-01 LAB
ANION GAP SERPL CALCULATED.3IONS-SCNC: 8 MMOL/L (ref 4–13)
BUN SERPL-MCNC: 13 MG/DL (ref 5–25)
CALCIUM SERPL-MCNC: 8.5 MG/DL (ref 8.4–10.2)
CHLORIDE SERPL-SCNC: 106 MMOL/L (ref 96–108)
CO2 SERPL-SCNC: 27 MMOL/L (ref 21–32)
CREAT SERPL-MCNC: 1.62 MG/DL (ref 0.6–1.3)
GFR SERPL CREATININE-BSD FRML MDRD: 59 ML/MIN/1.73SQ M
GLUCOSE SERPL-MCNC: 128 MG/DL (ref 65–140)
POTASSIUM SERPL-SCNC: 3.6 MMOL/L (ref 3.5–5.3)
SODIUM SERPL-SCNC: 141 MMOL/L (ref 135–147)

## 2024-12-01 PROCEDURE — 80048 BASIC METABOLIC PNL TOTAL CA: CPT | Performed by: INTERNAL MEDICINE

## 2024-12-01 PROCEDURE — 99238 HOSP IP/OBS DSCHRG MGMT 30/<: CPT | Performed by: FAMILY MEDICINE

## 2024-12-01 RX ADMIN — HEPARIN SODIUM 5000 UNITS: 5000 INJECTION, SOLUTION INTRAVENOUS; SUBCUTANEOUS at 05:49

## 2024-12-01 RX ADMIN — LEVETIRACETAM 1500 MG: 100 INJECTION, SOLUTION INTRAVENOUS at 09:37

## 2024-12-01 RX ADMIN — ZONISAMIDE 300 MG: 100 CAPSULE ORAL at 09:37

## 2024-12-01 NOTE — PLAN OF CARE
Problem: PAIN - ADULT  Goal: Verbalizes/displays adequate comfort level or baseline comfort level  Description: Interventions:  - Encourage patient to monitor pain and request assistance  - Assess pain using appropriate pain scale  - Administer analgesics based on type and severity of pain and evaluate response  - Implement non-pharmacological measures as appropriate and evaluate response  - Consider cultural and social influences on pain and pain management  - Notify physician/advanced practitioner if interventions unsuccessful or patient reports new pain  Outcome: Progressing     Problem: INFECTION - ADULT  Goal: Absence or prevention of progression during hospitalization  Description: INTERVENTIONS:  - Assess and monitor for signs and symptoms of infection  - Monitor lab/diagnostic results  - Monitor all insertion sites, i.e. indwelling lines, tubes, and drains  - Monitor endotracheal if appropriate and nasal secretions for changes in amount and color  - [place] appropriate cooling/warming therapies per order  - Administer medications as ordered  - Instruct and encourage patient and family to use good hand hygiene technique  - Identify and instruct in appropriate isolation precautions for identified infection/condition  Outcome: Progressing     Problem: SAFETY ADULT  Goal: Patient will remain free of falls  Description: INTERVENTIONS:  - Educate patient/family on patient safety including physical limitations  - Instruct patient to call for assistance with activity   - Consult OT/PT to assist with strengthening/mobility   - Keep Call bell within reach  - Keep bed low and locked with side rails adjusted as appropriate  - Keep care items and personal belongings within reach  - Initiate and maintain comfort rounds  - Make Fall Risk Sign visible to staff  - Offer Toileting every  Hours, in advance of need  - Initiate/Maintain alarm  - Obtain necessary fall risk management equipment:   - Apply yellow socks and  bracelet for high fall risk patients  - Consider moving patient to room near nurses station  Outcome: Progressing  Goal: Maintain or return to baseline ADL function  Description: INTERVENTIONS:  -  Assess patient's ability to carry out ADLs; assess patient's baseline for ADL function and identify physical deficits which impact ability to perform ADLs (bathing, care of mouth/teeth, toileting, grooming, dressing, etc.)  - Assess/evaluate cause of self-care deficits   - Assess range of motion  - Assess patient's mobility; develop plan if impaired  - Assess patient's need for assistive devices and provide as appropriate  - Encourage maximum independence but intervene and supervise when necessary  - Involve family in performance of ADLs  - Assess for home care needs following discharge   - Consider OT consult to assist with ADL evaluation and planning for discharge  - Provide patient education as appropriate  Outcome: Progressing  Goal: Maintains/Returns to pre admission functional level  Description: INTERVENTIONS:  - Perform AM-PAC 6 Click Basic Mobility/ Daily Activity assessment daily.  - Set and communicate daily mobility goal to care team and patient/family/caregiver.   - Collaborate with rehabilitation services on mobility goals if consulted  - Perform Range of Motion  times a day.  - Reposition patient every  hours.  - Dangle patient  times a day  - Stand patient  times a day  - Ambulate patient  times a day  - Out of bed to chair  times a day   - Out of bed for meals  times a day  - Out of bed for toileting  - Record patient progress and toleration of activity level   Outcome: Progressing     Problem: DISCHARGE PLANNING  Goal: Discharge to home or other facility with appropriate resources  Description: INTERVENTIONS:  - Identify barriers to discharge w/patient and caregiver  - Arrange for needed discharge resources and transportation as appropriate  - Identify discharge learning needs (meds, wound care, etc.)  -  Arrange for interpretive services to assist at discharge as needed  - Refer to Case Management Department for coordinating discharge planning if the patient needs post-hospital services based on physician/advanced practitioner order or complex needs related to functional status, cognitive ability, or social support system  Outcome: Progressing     Problem: Knowledge Deficit  Goal: Patient/family/caregiver demonstrates understanding of disease process, treatment plan, medications, and discharge instructions  Description: Complete learning assessment and assess knowledge base.  Interventions:  - Provide teaching at level of understanding  - Provide teaching via preferred learning methods  Outcome: Progressing     Problem: SAFETY,RESTRAINT: NV/NON-SELF DESTRUCTIVE BEHAVIOR  Goal: Remains free of harm/injury (restraint for non violent/non self-detsructive behavior)  Description: INTERVENTIONS:  - Instruct patient/family regarding restraint use   - Assess and monitor physiologic and psychological status   - Provide interventions and comfort measures to meet assessed patient needs   - Identify and implement measures to help patient regain control  - Assess readiness for release of restraint   Outcome: Progressing  Goal: Returns to optimal restraint-free functioning  Description: INTERVENTIONS:  - Assess the patient's behavior and symptoms that indicate continued need for restraint  - Identify and implement measures to help patient regain control  - Assess readiness for release of restraint   Outcome: Progressing     Problem: Prexisting or High Potential for Compromised Skin Integrity  Goal: Skin integrity is maintained or improved  Description: INTERVENTIONS:  - Identify patients at risk for skin breakdown  - Assess and monitor skin integrity  - Assess and monitor nutrition and hydration status  - Monitor labs   - Assess for incontinence   - Turn and reposition patient  - Assist with mobility/ambulation  - Relieve  pressure over bony prominences  - Avoid friction and shearing  - Provide appropriate hygiene as needed including keeping skin clean and dry  - Evaluate need for skin moisturizer/barrier cream  - Collaborate with interdisciplinary team   - Patient/family teaching  - Consider wound care consult   Outcome: Progressing     Problem: Nutrition/Hydration-ADULT  Goal: Nutrient/Hydration intake appropriate for improving, restoring or maintaining nutritional needs  Description: Monitor and assess patient's nutrition/hydration status for malnutrition. Collaborate with interdisciplinary team and initiate plan and interventions as ordered.  Monitor patient's weight and dietary intake as ordered or per policy. Utilize nutrition screening tool and intervene as necessary. Determine patient's food preferences and provide high-protein, high-caloric foods as appropriate.     INTERVENTIONS:  - Monitor oral intake, urinary output, labs, and treatment plans  - Assess nutrition and hydration status and recommend course of action  - Evaluate amount of meals eaten  - Assist patient with eating if necessary   - Allow adequate time for meals  - Recommend/ encourage appropriate diets, oral nutritional supplements, and vitamin/mineral supplements  - Order, calculate, and assess calorie counts as needed  - Recommend, monitor, and adjust tube feedings and TPN/PPN based on assessed needs  - Assess need for intravenous fluids  - Provide specific nutrition/hydration education as appropriate  - Include patient/family/caregiver in decisions related to nutrition  Outcome: Progressing

## 2024-12-01 NOTE — DISCHARGE SUMMARY
Discharge Summary - Hospitalist   Name: Tim Alanis Jr. 24 y.o. male I MRN: [id number]  [facility] Date of Admission: 11/28/2024   Date of Service: 12/1/2024 I Hospital Day: 3     Assessment & Plan  Drug use  UDS positive for amphetamines, benzodiazepines, THC  Counseled regarding drug use   Status epilepticus (HCC)  Secondary to amphetamine use  Cont AED per neurology  Seizure precautions  Outpatient epileptologist follow-up   BALWINDER (acute kidney injury) (HCC)  Secondary to status epilepticus, severe lactic acidosis, muscle breakdown products, transient hypotension  Peaked at creatinine 2.8 11/29, improved to 2.7 today continue IV fluids  Patient remain inpatient for daily renal function monitoring      Resolving this AM. DC home, needs BMP repeatd and PCP f/u Thursday or friday     Medical Problems       Resolved Problems  Date Reviewed: 12/1/2024   None       Discharging Physician / Practitioner: Hamilton Crow DO  PCP: No primary care provider on file.  Admission Date:   Admission Orders (From admission, onward)       Ordered        11/28/24 1609  Inpatient Admission  Once                          Discharge Date: 12/01/24    Consultations During Hospital Stay:  none    Procedures Performed:   none    Significant Findings / Test Results:   No orders to display         Incidental Findings:          Test Results Pending at Discharge (will require follow up):   BMP in 1 week     Outpatient Tests Requested:  BMP    Complications:  none    Reason for Admission: BALWINDER, seizure-like acitivity    Hospital Course:   Tim Alanis Jr. is a 24 y.o. male patient who originally presented to the hospital on 11/28/2024 due to seizure activity as well as BALWINDER secondary to above.  Initial creatinine on admission was 2.8, trending downward 2 days after, today, creatinine was 1.6.  Stable for discharge.          Please see above list of diagnoses and related plan for additional information.     Condition at Discharge:  stable    Discharge Day Visit / Exam:   Subjective: Patient was seen and examined at bedside.  Patient states he is wanting to go home at this time.  Advised patient he is okay to go home as long as he gets basic metabolic panel to check kidney function follow with his PCP by the end of the week.  Patient agreeable.  Vitals: Blood Pressure: 131/93 (12/01/24 0712)  Pulse: 58 (12/01/24 0712)  Temperature: 97.8 °F (36.6 °C) (12/01/24 0712)  Temp Source: Oral (11/30/24 1924)  Respirations: 16 (12/01/24 0712)  Height: 6' (182.9 cm) (11/28/24 1600)  Weight - Scale: 80.9 kg (178 lb 5.6 oz) (11/28/24 1600)  SpO2: 98 % (12/01/24 0712)  Physical Exam  Vitals reviewed.   Constitutional:       General: He is not in acute distress.     Appearance: Normal appearance. He is well-developed.   HENT:      Head: Normocephalic and atraumatic.   Eyes:      Conjunctiva/sclera: Conjunctivae normal.   Cardiovascular:      Rate and Rhythm: Normal rate and regular rhythm.      Pulses: Normal pulses.      Heart sounds: Normal heart sounds. No murmur heard.  Pulmonary:      Effort: Pulmonary effort is normal. No respiratory distress.      Breath sounds: Normal breath sounds.   Abdominal:      Palpations: Abdomen is soft.      Tenderness: There is no abdominal tenderness.   Musculoskeletal:         General: No swelling.      Cervical back: Neck supple.   Skin:     General: Skin is warm and dry.      Capillary Refill: Capillary refill takes less than 2 seconds.   Neurological:      Mental Status: He is alert.   Psychiatric:         Mood and Affect: Mood normal.          Discussion with Family: Patient declined call to .     Discharge instructions/Information to patient and family:   See after visit summary for information provided to patient and family.      Provisions for Follow-Up Care:  See after visit summary for information related to follow-up care and any pertinent home health orders.      Mobility at time of Discharge:    Basic Mobility Inpatient Raw Score: 18  -HLM Goal: 6: Walk 10 steps or more  JH-HLM Achieved: 2: Bed activities/Dependent transfer  HLM Goal achieved. Continue to encourage appropriate mobility.     Disposition:   Home    Planned Readmission: no    Discharge Medications:  See after visit summary for reconciled discharge medications provided to patient and/or family.      Administrative Statements   Discharge Statement:  I have spent a total time of 37 minutes in caring for this patient on the day of the visit/encounter. >30 minutes of time was spent on: Diagnostic results, Prognosis, Risks and benefits of tx options, Instructions for management, Importance of tx compliance, Risk factor reductions, Impressions, Counseling / Coordination of care, Documenting in the medical record, and Reviewing / ordering tests, medicine, procedures  .    **Please Note: This note may have been constructed using a voice recognition system**

## 2024-12-01 NOTE — ASSESSMENT & PLAN NOTE
Secondary to status epilepticus, severe lactic acidosis, muscle breakdown products, transient hypotension  Peaked at creatinine 2.8 11/29, improved to 2.7 today continue IV fluids  Patient remain inpatient for daily renal function monitoring      Resolving this AM. DC home, needs BMP repeatd and PCP f/u Thursday or friday

## 2024-12-01 NOTE — DISCHARGE INSTR - AVS FIRST PAGE
1.  Please follow-up with PCP within 1 week for hospital follow-up.    2.  Please follow-up with neurology in 1 week to discuss hospital stay and medications.    3.  Please get lab work this coming Friday.

## 2024-12-01 NOTE — PLAN OF CARE
Problem: PAIN - ADULT  Goal: Verbalizes/displays adequate comfort level or baseline comfort level  Description: Interventions:  - Encourage patient to monitor pain and request assistance  - Assess pain using appropriate pain scale  - Administer analgesics based on type and severity of pain and evaluate response  - Implement non-pharmacological measures as appropriate and evaluate response  - Consider cultural and social influences on pain and pain management  - Notify physician/advanced practitioner if interventions unsuccessful or patient reports new pain  Outcome: Progressing     Problem: INFECTION - ADULT  Goal: Absence or prevention of progression during hospitalization  Description: INTERVENTIONS:  - Assess and monitor for signs and symptoms of infection  - Monitor lab/diagnostic results  - Monitor all insertion sites, i.e. indwelling lines, tubes, and drains  - Monitor endotracheal if appropriate and nasal secretions for changes in amount and color  - Graham appropriate cooling/warming therapies per order  - Administer medications as ordered  - Instruct and encourage patient and family to use good hand hygiene technique  - Identify and instruct in appropriate isolation precautions for identified infection/condition  Outcome: Progressing     Problem: SAFETY ADULT  Goal: Patient will remain free of falls  Description: INTERVENTIONS:  - Educate patient/family on patient safety including physical limitations  - Instruct patient to call for assistance with activity   - Consult OT/PT to assist with strengthening/mobility   - Keep Call bell within reach  - Keep bed low and locked with side rails adjusted as appropriate  - Keep care items and personal belongings within reach  - Initiate and maintain comfort rounds  - Make Fall Risk Sign visible to staff  - Offer Toileting every  Hours, in advance of need  - Initiate/Maintain alarm  - Obtain necessary fall risk management equipment:   - Apply yellow socks and  bracelet for high fall risk patients  - Consider moving patient to room near nurses station  Outcome: Progressing  Goal: Maintain or return to baseline ADL function  Description: INTERVENTIONS:  -  Assess patient's ability to carry out ADLs; assess patient's baseline for ADL function and identify physical deficits which impact ability to perform ADLs (bathing, care of mouth/teeth, toileting, grooming, dressing, etc.)  - Assess/evaluate cause of self-care deficits   - Assess range of motion  - Assess patient's mobility; develop plan if impaired  - Assess patient's need for assistive devices and provide as appropriate  - Encourage maximum independence but intervene and supervise when necessary  - Involve family in performance of ADLs  - Assess for home care needs following discharge   - Consider OT consult to assist with ADL evaluation and planning for discharge  - Provide patient education as appropriate  Outcome: Progressing  Goal: Maintains/Returns to pre admission functional level  Description: INTERVENTIONS:  - Perform AM-PAC 6 Click Basic Mobility/ Daily Activity assessment daily.  - Set and communicate daily mobility goal to care team and patient/family/caregiver.   - Collaborate with rehabilitation services on mobility goals if consulted  - Perform Range of Motion  times a day.  - Reposition patient every  hours.  - Dangle patient  times a day  - Stand patient  times a day  - Ambulate patient  times a day  - Out of bed to chair  times a day   - Out of bed for meals  times a day  - Out of bed for toileting  - Record patient progress and toleration of activity level   Outcome: Progressing     Problem: DISCHARGE PLANNING  Goal: Discharge to home or other facility with appropriate resources  Description: INTERVENTIONS:  - Identify barriers to discharge w/patient and caregiver  - Arrange for needed discharge resources and transportation as appropriate  - Identify discharge learning needs (meds, wound care, etc.)  -  Arrange for interpretive services to assist at discharge as needed  - Refer to Case Management Department for coordinating discharge planning if the patient needs post-hospital services based on physician/advanced practitioner order or complex needs related to functional status, cognitive ability, or social support system  Outcome: Progressing     Problem: Knowledge Deficit  Goal: Patient/family/caregiver demonstrates understanding of disease process, treatment plan, medications, and discharge instructions  Description: Complete learning assessment and assess knowledge base.  Interventions:  - Provide teaching at level of understanding  - Provide teaching via preferred learning methods  Outcome: Progressing     Problem: SAFETY,RESTRAINT: NV/NON-SELF DESTRUCTIVE BEHAVIOR  Goal: Remains free of harm/injury (restraint for non violent/non self-detsructive behavior)  Description: INTERVENTIONS:  - Instruct patient/family regarding restraint use   - Assess and monitor physiologic and psychological status   - Provide interventions and comfort measures to meet assessed patient needs   - Identify and implement measures to help patient regain control  - Assess readiness for release of restraint   Outcome: Progressing  Goal: Returns to optimal restraint-free functioning  Description: INTERVENTIONS:  - Assess the patient's behavior and symptoms that indicate continued need for restraint  - Identify and implement measures to help patient regain control  - Assess readiness for release of restraint   Outcome: Progressing     Problem: Prexisting or High Potential for Compromised Skin Integrity  Goal: Skin integrity is maintained or improved  Description: INTERVENTIONS:  - Identify patients at risk for skin breakdown  - Assess and monitor skin integrity  - Assess and monitor nutrition and hydration status  - Monitor labs   - Assess for incontinence   - Turn and reposition patient  - Assist with mobility/ambulation  - Relieve  pressure over bony prominences  - Avoid friction and shearing  - Provide appropriate hygiene as needed including keeping skin clean and dry  - Evaluate need for skin moisturizer/barrier cream  - Collaborate with interdisciplinary team   - Patient/family teaching  - Consider wound care consult   Outcome: Progressing     Problem: Nutrition/Hydration-ADULT  Goal: Nutrient/Hydration intake appropriate for improving, restoring or maintaining nutritional needs  Description: Monitor and assess patient's nutrition/hydration status for malnutrition. Collaborate with interdisciplinary team and initiate plan and interventions as ordered.  Monitor patient's weight and dietary intake as ordered or per policy. Utilize nutrition screening tool and intervene as necessary. Determine patient's food preferences and provide high-protein, high-caloric foods as appropriate.     INTERVENTIONS:  - Monitor oral intake, urinary output, labs, and treatment plans  - Assess nutrition and hydration status and recommend course of action  - Evaluate amount of meals eaten  - Assist patient with eating if necessary   - Allow adequate time for meals  - Recommend/ encourage appropriate diets, oral nutritional supplements, and vitamin/mineral supplements  - Order, calculate, and assess calorie counts as needed  - Recommend, monitor, and adjust tube feedings and TPN/PPN based on assessed needs  - Assess need for intravenous fluids  - Provide specific nutrition/hydration education as appropriate  - Include patient/family/caregiver in decisions related to nutrition  Outcome: Progressing

## 2024-12-02 ENCOUNTER — RESULTS FOLLOW-UP (OUTPATIENT)
Dept: EMERGENCY DEPT | Facility: HOSPITAL | Age: 24
End: 2024-12-02

## 2024-12-02 LAB — ZONISAMIDE SERPL-MCNC: 6.7 UG/ML (ref 10–40)

## 2024-12-02 NOTE — UTILIZATION REVIEW
NOTIFICATION OF ADMISSION DISCHARGE   This is a Notification of Discharge from [facility]. Please be advised that this patient has been discharge from our facility. Below you will find the admission and discharge date and time including the patient’s disposition.   UTILIZATION REVIEW CONTACT:  Opal Summers  Utilization   Network Utilization Review Department  Phone: [phone] x carefully listen to the prompts. All voicemails are confidential.  Email: [email or web address]     ADMISSION INFORMATION  PRESENTATION DATE: 11/28/2024  3:48 PM  OBERVATION ADMISSION DATE: N/A  INPATIENT ADMISSION DATE: 11/28/24  3:48 PM   DISCHARGE DATE: 12/1/2024 12:51 PM   DISPOSITION:Home/Self Care    Network Utilization Review Department  ATTENTION: Please call with any questions or concerns to [phone] and carefully listen to the prompts so that you are directed to the right person. All voicemails are confidential.   For Discharge needs, contact Care Management DC Support Team at [phone] opt. 2  Send all requests for admission clinical reviews, approved or denied determinations and any other requests to dedicated fax number below belonging to the campus where the patient is receiving treatment. List of dedicated fax numbers for the Facilities:  FACILITY NAME UR FAX NUMBER   ADMISSION DENIALS (Administrative/Medical Necessity) [phone]   DISCHARGE SUPPORT TEAM ([facility]) [phone]   PARENT CHILD HEALTH (Maternity/NICU/Pediatrics) [phone]   [facility] [phone]   [facility] [phone]   [facility] [phone]   [facility] [phone]   Critical access hospital [phone]   [facility] [phone]   [facility] [phone]   [facility]  [phone]   [facility] [phone]   [facility] [phone]   [facility] [phone]   [facility] [phone]

## 2024-12-05 ENCOUNTER — APPOINTMENT (OUTPATIENT)
Dept: LAB | Facility: HOSPITAL | Age: 24
End: 2024-12-05
Payer: COMMERCIAL

## 2024-12-05 DIAGNOSIS — G40.309 EPILEPTIC SEIZURE, GENERALIZED (HCC): ICD-10-CM

## 2024-12-05 DIAGNOSIS — N17.9 AKI (ACUTE KIDNEY INJURY) (HCC): ICD-10-CM

## 2024-12-05 LAB
ANION GAP SERPL CALCULATED.3IONS-SCNC: 5 MMOL/L (ref 4–13)
BUN SERPL-MCNC: 16 MG/DL (ref 5–25)
CALCIUM SERPL-MCNC: 9.4 MG/DL (ref 8.4–10.2)
CHLORIDE SERPL-SCNC: 105 MMOL/L (ref 96–108)
CO2 SERPL-SCNC: 29 MMOL/L (ref 21–32)
CREAT SERPL-MCNC: 0.79 MG/DL (ref 0.6–1.3)
GFR SERPL CREATININE-BSD FRML MDRD: 127 ML/MIN/1.73SQ M
GLUCOSE P FAST SERPL-MCNC: 80 MG/DL (ref 65–99)
POTASSIUM SERPL-SCNC: 4.4 MMOL/L (ref 3.5–5.3)
SODIUM SERPL-SCNC: 139 MMOL/L (ref 135–147)

## 2024-12-05 PROCEDURE — 80048 BASIC METABOLIC PNL TOTAL CA: CPT

## 2024-12-05 PROCEDURE — 36415 COLL VENOUS BLD VENIPUNCTURE: CPT

## 2024-12-06 LAB
ATRIAL RATE: 115 BPM
P AXIS: 81 DEGREES
PR INTERVAL: 150 MS
QRS AXIS: 92 DEGREES
QRSD INTERVAL: 88 MS
QT INTERVAL: 330 MS
QTC INTERVAL: 456 MS
T WAVE AXIS: 66 DEGREES
VENTRICULAR RATE: 115 BPM

## 2024-12-06 PROCEDURE — 93010 ELECTROCARDIOGRAM REPORT: CPT | Performed by: INTERNAL MEDICINE

## 2024-12-25 ENCOUNTER — HOSPITAL ENCOUNTER (EMERGENCY)
Facility: HOSPITAL | Age: 24
Discharge: HOME | End: 2024-12-26
Attending: EMERGENCY MEDICINE
Payer: COMMERCIAL

## 2024-12-25 VITALS
BODY MASS INDEX: 26.19 KG/M2 | WEIGHT: 193.12 LBS | SYSTOLIC BLOOD PRESSURE: 117 MMHG | HEART RATE: 115 BPM | DIASTOLIC BLOOD PRESSURE: 64 MMHG | RESPIRATION RATE: 16 BRPM | TEMPERATURE: 97.1 F | OXYGEN SATURATION: 98 %

## 2024-12-25 DIAGNOSIS — R56.9 SEIZURE (HCC): Primary | ICD-10-CM

## 2024-12-25 LAB
ALBUMIN SERPL BCG-MCNC: 4.9 G/DL (ref 3.5–5)
ALP SERPL-CCNC: 62 U/L (ref 34–104)
ALT SERPL W P-5'-P-CCNC: 110 U/L (ref 7–52)
ANION GAP SERPL CALCULATED.3IONS-SCNC: 18 MMOL/L (ref 4–13)
AST SERPL W P-5'-P-CCNC: 60 U/L (ref 13–39)
BILIRUB SERPL-MCNC: 0.36 MG/DL (ref 0.2–1)
BUN SERPL-MCNC: 21 MG/DL (ref 5–25)
CALCIUM SERPL-MCNC: 9.6 MG/DL (ref 8.4–10.2)
CHLORIDE SERPL-SCNC: 104 MMOL/L (ref 96–108)
CO2 SERPL-SCNC: 17 MMOL/L (ref 21–32)
CREAT SERPL-MCNC: 1.15 MG/DL (ref 0.6–1.3)
ERYTHROCYTE [DISTWIDTH] IN BLOOD BY AUTOMATED COUNT: 13 % (ref 11.6–15.1)
ETHANOL SERPL-MCNC: <10 MG/DL
GFR SERPL CREATININE-BSD FRML MDRD: 89 ML/MIN/1.73SQ M
GLUCOSE SERPL-MCNC: 86 MG/DL (ref 65–140)
HCT VFR BLD AUTO: 42.3 % (ref 36.5–49.3)
HGB BLD-MCNC: 14.1 G/DL (ref 12–17)
MCH RBC QN AUTO: 30.7 PG (ref 26.8–34.3)
MCHC RBC AUTO-ENTMCNC: 33.3 G/DL (ref 31.4–37.4)
MCV RBC AUTO: 92 FL (ref 82–98)
PLATELET # BLD AUTO: 236 THOUSANDS/UL (ref 149–390)
PMV BLD AUTO: 10.6 FL (ref 8.9–12.7)
POTASSIUM SERPL-SCNC: 4.3 MMOL/L (ref 3.5–5.3)
PROT SERPL-MCNC: 7.2 G/DL (ref 6.4–8.4)
RBC # BLD AUTO: 4.59 MILLION/UL (ref 3.88–5.62)
SODIUM SERPL-SCNC: 139 MMOL/L (ref 135–147)
WBC # BLD AUTO: 11.35 THOUSAND/UL (ref 4.31–10.16)

## 2024-12-25 PROCEDURE — 99284 EMERGENCY DEPT VISIT MOD MDM: CPT

## 2024-12-25 PROCEDURE — 85007 BL SMEAR W/DIFF WBC COUNT: CPT | Performed by: EMERGENCY MEDICINE

## 2024-12-25 PROCEDURE — 96374 THER/PROPH/DIAG INJ IV PUSH: CPT

## 2024-12-25 PROCEDURE — 99284 EMERGENCY DEPT VISIT MOD MDM: CPT | Performed by: EMERGENCY MEDICINE

## 2024-12-25 PROCEDURE — 36415 COLL VENOUS BLD VENIPUNCTURE: CPT | Performed by: EMERGENCY MEDICINE

## 2024-12-25 PROCEDURE — 80053 COMPREHEN METABOLIC PANEL: CPT | Performed by: EMERGENCY MEDICINE

## 2024-12-25 PROCEDURE — 83520 IMMUNOASSAY QUANT NOS NONAB: CPT | Performed by: EMERGENCY MEDICINE

## 2024-12-25 PROCEDURE — 82077 ASSAY SPEC XCP UR&BREATH IA: CPT | Performed by: EMERGENCY MEDICINE

## 2024-12-25 PROCEDURE — 85027 COMPLETE CBC AUTOMATED: CPT | Performed by: EMERGENCY MEDICINE

## 2024-12-25 RX ORDER — LEVETIRACETAM 500 MG/5ML
1000 INJECTION, SOLUTION, CONCENTRATE INTRAVENOUS ONCE
Status: COMPLETED | OUTPATIENT
Start: 2024-12-25 | End: 2024-12-25

## 2024-12-25 RX ADMIN — LEVETIRACETAM 1000 MG: 100 INJECTION, SOLUTION INTRAVENOUS at 22:31

## 2024-12-26 VITALS
HEART RATE: 90 BPM | RESPIRATION RATE: 16 BRPM | DIASTOLIC BLOOD PRESSURE: 58 MMHG | OXYGEN SATURATION: 100 % | TEMPERATURE: 97.7 F | SYSTOLIC BLOOD PRESSURE: 132 MMHG

## 2024-12-26 DIAGNOSIS — G40.919 BREAKTHROUGH SEIZURE (HCC): Primary | ICD-10-CM

## 2024-12-26 LAB
ALBUMIN SERPL BCG-MCNC: 4 G/DL (ref 3.5–5)
ALBUMIN SERPL BCG-MCNC: 4.6 G/DL (ref 3.5–5)
ALP SERPL-CCNC: 59 U/L (ref 34–104)
ALP SERPL-CCNC: 68 U/L (ref 34–104)
ALT SERPL W P-5'-P-CCNC: 78 U/L (ref 7–52)
ALT SERPL W P-5'-P-CCNC: 92 U/L (ref 7–52)
ANION GAP SERPL CALCULATED.3IONS-SCNC: 14 MMOL/L (ref 4–13)
ANION GAP SERPL CALCULATED.3IONS-SCNC: 5 MMOL/L (ref 4–13)
AST SERPL W P-5'-P-CCNC: 34 U/L (ref 13–39)
AST SERPL W P-5'-P-CCNC: 41 U/L (ref 13–39)
BASOPHILS # BLD AUTO: 0.07 THOUSANDS/ÂΜL (ref 0–0.1)
BASOPHILS # BLD MANUAL: 0.11 THOUSAND/UL (ref 0–0.1)
BASOPHILS NFR BLD AUTO: 1 % (ref 0–1)
BASOPHILS NFR MAR MANUAL: 1 % (ref 0–1)
BILIRUB SERPL-MCNC: 0.42 MG/DL (ref 0.2–1)
BILIRUB SERPL-MCNC: 0.51 MG/DL (ref 0.2–1)
BUN SERPL-MCNC: 15 MG/DL (ref 5–25)
BUN SERPL-MCNC: 16 MG/DL (ref 5–25)
CALCIUM SERPL-MCNC: 10.1 MG/DL (ref 8.4–10.2)
CALCIUM SERPL-MCNC: 8.7 MG/DL (ref 8.4–10.2)
CHLORIDE SERPL-SCNC: 106 MMOL/L (ref 96–108)
CHLORIDE SERPL-SCNC: 110 MMOL/L (ref 96–108)
CO2 SERPL-SCNC: 22 MMOL/L (ref 21–32)
CO2 SERPL-SCNC: 25 MMOL/L (ref 21–32)
CREAT SERPL-MCNC: 0.78 MG/DL (ref 0.6–1.3)
CREAT SERPL-MCNC: 0.83 MG/DL (ref 0.6–1.3)
EOSINOPHIL # BLD AUTO: 0.27 THOUSAND/ÂΜL (ref 0–0.61)
EOSINOPHIL # BLD MANUAL: 0.45 THOUSAND/UL (ref 0–0.4)
EOSINOPHIL NFR BLD AUTO: 2 % (ref 0–6)
EOSINOPHIL NFR BLD MANUAL: 4 % (ref 0–6)
ERYTHROCYTE [DISTWIDTH] IN BLOOD BY AUTOMATED COUNT: 13 % (ref 11.6–15.1)
GFR SERPL CREATININE-BSD FRML MDRD: 124 ML/MIN/1.73SQ M
GFR SERPL CREATININE-BSD FRML MDRD: 128 ML/MIN/1.73SQ M
GLUCOSE SERPL-MCNC: 86 MG/DL (ref 65–140)
GLUCOSE SERPL-MCNC: 89 MG/DL (ref 65–140)
HCT VFR BLD AUTO: 41.4 % (ref 36.5–49.3)
HGB BLD-MCNC: 13.2 G/DL (ref 12–17)
IMM GRANULOCYTES # BLD AUTO: 0.04 THOUSAND/UL (ref 0–0.2)
IMM GRANULOCYTES NFR BLD AUTO: 0 % (ref 0–2)
LEVETIRACETAM SERPL-MCNC: 3.4 UG/ML (ref 12–46)
LEVETIRACETAM SERPL-MCNC: <2 UG/ML (ref 12–46)
LYMPHOCYTES # BLD AUTO: 2.86 THOUSANDS/ÂΜL (ref 0.6–4.47)
LYMPHOCYTES # BLD AUTO: 26 % (ref 14–44)
LYMPHOCYTES # BLD AUTO: 3.06 THOUSAND/UL (ref 0.6–4.47)
LYMPHOCYTES NFR BLD AUTO: 26 % (ref 14–44)
MCH RBC QN AUTO: 30.5 PG (ref 26.8–34.3)
MCHC RBC AUTO-ENTMCNC: 31.9 G/DL (ref 31.4–37.4)
MCV RBC AUTO: 96 FL (ref 82–98)
MONOCYTES # BLD AUTO: 1.48 THOUSAND/UL (ref 0–1.22)
MONOCYTES # BLD AUTO: 1.53 THOUSAND/ÂΜL (ref 0.17–1.22)
MONOCYTES NFR BLD AUTO: 14 % (ref 4–12)
MONOCYTES NFR BLD: 13 % (ref 4–12)
NEUTROPHILS # BLD AUTO: 6.46 THOUSANDS/ÂΜL (ref 1.85–7.62)
NEUTROPHILS # BLD MANUAL: 6.24 THOUSAND/UL (ref 1.85–7.62)
NEUTS BAND NFR BLD MANUAL: 2 % (ref 0–8)
NEUTS SEG NFR BLD AUTO: 53 % (ref 43–75)
NEUTS SEG NFR BLD AUTO: 57 % (ref 43–75)
NRBC BLD AUTO-RTO: 0 /100 WBCS
PLATELET # BLD AUTO: 236 THOUSANDS/UL (ref 149–390)
PLATELET BLD QL SMEAR: ADEQUATE
PMV BLD AUTO: 10.7 FL (ref 8.9–12.7)
POTASSIUM SERPL-SCNC: 5 MMOL/L (ref 3.5–5.3)
POTASSIUM SERPL-SCNC: 5.9 MMOL/L (ref 3.5–5.3)
PROT SERPL-MCNC: 6.3 G/DL (ref 6.4–8.4)
PROT SERPL-MCNC: 7.3 G/DL (ref 6.4–8.4)
RBC # BLD AUTO: 4.33 MILLION/UL (ref 3.88–5.62)
RBC MORPH BLD: NORMAL
SODIUM SERPL-SCNC: 136 MMOL/L (ref 135–147)
SODIUM SERPL-SCNC: 146 MMOL/L (ref 135–147)
VARIANT LYMPHS # BLD AUTO: 1 %
WBC # BLD AUTO: 11.23 THOUSAND/UL (ref 4.31–10.16)

## 2024-12-26 PROCEDURE — 99285 EMERGENCY DEPT VISIT HI MDM: CPT | Performed by: PHYSICIAN ASSISTANT

## 2024-12-26 PROCEDURE — 80053 COMPREHEN METABOLIC PANEL: CPT | Performed by: PHYSICIAN ASSISTANT

## 2024-12-26 PROCEDURE — 85025 COMPLETE CBC W/AUTO DIFF WBC: CPT | Performed by: PHYSICIAN ASSISTANT

## 2024-12-26 PROCEDURE — 96361 HYDRATE IV INFUSION ADD-ON: CPT

## 2024-12-26 PROCEDURE — 96375 TX/PRO/DX INJ NEW DRUG ADDON: CPT

## 2024-12-26 PROCEDURE — 36415 COLL VENOUS BLD VENIPUNCTURE: CPT | Performed by: PHYSICIAN ASSISTANT

## 2024-12-26 PROCEDURE — 99284 EMERGENCY DEPT VISIT MOD MDM: CPT

## 2024-12-26 PROCEDURE — 83520 IMMUNOASSAY QUANT NOS NONAB: CPT | Performed by: PHYSICIAN ASSISTANT

## 2024-12-26 PROCEDURE — 96374 THER/PROPH/DIAG INJ IV PUSH: CPT

## 2024-12-26 RX ORDER — LORAZEPAM 2 MG/ML
1 INJECTION INTRAMUSCULAR ONCE
Status: DISCONTINUED | OUTPATIENT
Start: 2024-12-26 | End: 2024-12-26

## 2024-12-26 RX ORDER — LORAZEPAM 2 MG/ML
2 INJECTION INTRAMUSCULAR ONCE
Status: COMPLETED | OUTPATIENT
Start: 2024-12-26 | End: 2024-12-26

## 2024-12-26 RX ORDER — LEVETIRACETAM 500 MG/5ML
1500 INJECTION, SOLUTION, CONCENTRATE INTRAVENOUS ONCE
Status: COMPLETED | OUTPATIENT
Start: 2024-12-26 | End: 2024-12-26

## 2024-12-26 RX ADMIN — SODIUM CHLORIDE 1000 ML: 0.9 INJECTION, SOLUTION INTRAVENOUS at 16:06

## 2024-12-26 RX ADMIN — LORAZEPAM 2 MG: 2 INJECTION INTRAMUSCULAR; INTRAVENOUS at 16:02

## 2024-12-26 RX ADMIN — LEVETIRACETAM 1500 MG: 100 INJECTION, SOLUTION INTRAVENOUS at 16:02

## 2024-12-26 NOTE — ED NOTES
Seizure activity noted lasting approximately 3 minutes. IV established and medications administered for the same. Patient placed on nonrebreather mask. Patient postictal at this time. Mom is at bedside.      Kaitlin Black RN  12/26/24 6070

## 2024-12-26 NOTE — ED ATTENDING ATTESTATION
12/26/2024  IFadi DO, saw and evaluated the patient. I have discussed the patient with the resident/non-physician practitioner and agree with the resident's/non-physician practitioner's findings, Plan of Care, and MDM as documented in the resident's/non-physician practitioner's note, except where noted. All available labs and Radiology studies were reviewed.  I was present for key portions of any procedure(s) performed by the resident/non-physician practitioner and I was immediately available to provide assistance.       At this point I agree with the current assessment done in the Emergency Department.  I have conducted an independent evaluation of this patient a history and physical is as follows:    ED Course     Patient is a 24-year-old male seen and evaluated with physician assistant.  Patient is a patient with a history of recurrent seizure secondary to noncompliance with his medication.  Patient presented to the emergency room again today for similar occurrence.  Brought to the emergency room by his mother.  Patient was discharged yesterday after treatment.  Patient has signed out from emergency department in the past.    Diagnosis is that of seizure and medical noncompliance.    Patient had a Keppra load.  Patient was feeling better.  Patient once again declined further evaluation.  He was subsequently discharged.  Patient continues with drug abuse disorder and noncompliance with his medications for epilepsy.  Patient was advised that he would likely have a repeat seizure and that his continued noncompliance and drug use would likely lead to a serious medical condition such as stroke, loss of normal activity, loss of sexual function or or possibly death.    Patient again expressed understanding of same and requested discharge
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
## 2024-12-26 NOTE — ED NOTES
Patient aware of needing urine sample, urinal given at this time.     Gabi Dolan RN  12/25/24 5641

## 2024-12-27 NOTE — ED PROVIDER NOTES
"Time reflects when diagnosis was documented in both MDM as applicable and the Disposition within this note       Time User Action Codes Description Comment    12/26/2024  5:49 PM Armin Dean Add [G40.919] Breakthrough seizure (HCC)           ED Disposition       ED Disposition   Discharge    Condition   Stable    Date/Time   Thu Dec 26, 2024  5:49 PM    Comment   Tim Alanis Jr. discharge to home/self care.                   Assessment & Plan       Medical Decision Making  The patient is a 24-year-old male presents today with a chief complaint of breakthrough seizure.  Patient is from home.  Patient has recurrent seizure disorder.  The patient had an episode prior to arrival where he slumped back into his chair.  Had grand mal seizure.  Was confused afterwards.  EMS was called by mother who witnessed the seizure activity.  Patient was confused afterwards.  Patient upon arrival is alert and oriented.  Admits to drug use.  Patient states \"I may have forgotten to take some of my Keppra doses\".  See last evening was given an IV bolus of Keppra.  Did not wish to stay.    Patient is additional CMP was hemolyzed, redraw showed normal potassium level.  The patient had 1 episode of seizure activity in the emergency department where Ativan was given.  Patient returned to baseline.  Patient was given 1500 IV Keppra.  Patient's Keppra level obtained from last night demonstrated less than 2.  Patient is noncompliant with medication.    Previous ED visits the patient has been positive for drug use.  Mom at bedside.    Patient alert and oriented.  States \"I want to go the F**K  home\" was agreeable to having lab work and receiving IV medications.  Patient did not wish to be admitted.  Patient did not wish to continue treatment for seizures.  Patient is recurrently in the ED for seizure activity. Unfortunately the patient is continuing behaviors such as drug use and medical noncompliance which is causing seizure " activity.    The patient has been admitted in the ICU previously for status epilepticus.  Patient is well aware of risks of brain injury, stroke, ACS, hemorrhage, death due to uncontrolled seizures but still wishes to go home .  stressed the importance of medical compliance    Amount and/or Complexity of Data Reviewed  Labs: ordered.    Risk  Prescription drug management.             Medications   levETIRAcetam (KEPPRA) injection 1,500 mg (1,500 mg Intravenous Given 12/26/24 1602)   sodium chloride 0.9 % bolus 1,000 mL (0 mL Intravenous Stopped 12/26/24 1743)   LORazepam (ATIVAN) injection 2 mg (2 mg Intravenous Given 12/26/24 1602)       ED Risk Strat Scores                          SBIRT 22yo+      Flowsheet Row Most Recent Value   Initial Alcohol Screen: US AUDIT-C     1. How often do you have a drink containing alcohol? 0 Filed at: 12/26/2024 1448   2. How many drinks containing alcohol do you have on a typical day you are drinking?  0 Filed at: 12/26/2024 1448   3a. Male UNDER 65: How often do you have five or more drinks on one occasion? 0 Filed at: 12/26/2024 1448   Audit-C Score 0 Filed at: 12/26/2024 1448   DANIELE: How many times in the past year have you...    Used an illegal drug or used a prescription medication for non-medical reasons? Never Filed at: 12/26/2024 1448                            History of Present Illness       Chief Complaint   Patient presents with    Seizure - Prior Hx Of     Patient presents to the ED with reports of a seizure today. EMS reports mom called reporting seizure activity. Patient has a prior history of, and has a history of non-compliance. Patient is agitated upon arrival, stating he just wants to go home. Alert and oriented. Patient has no complaints, other than his presence in the ED.        Past Medical History:   Diagnosis Date    Seizure (HCC)       History reviewed. No pertinent surgical history.   History reviewed. No pertinent family history.   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     The patient is a 24-year-old male presents today with a chief complaint of breakthrough seizure.  Patient is from home.  Patient has recurrent seizure disorder.  The patient had an episode prior to arrival where he slumped back into his chair.  Had grand mal seizure.  Was confused afterwards.  EMS was called by mother who witnessed the seizure activity.  Patient was confused afterwards.  Patient upon arrival is alert and oriented.  Admits to drug use.  Patient states \"I may have forgotten to take some of my Keppra doses\".  See last evening was given an IV bolus of Keppra.  Did not wish to stay.          Review of Systems   All other systems reviewed and are negative.          Objective       ED Triage Vitals [12/26/24 1449]   Temperature Pulse Blood Pressure Respirations SpO2 Patient Position - Orthostatic VS   97.7 °F (36.5 °C) 96 134/74 16 98 % Sitting      Temp Source Heart Rate Source BP Location FiO2 (%) Pain Score    Temporal Monitor Right arm -- No Pain      Vitals      Date and Time Temp Pulse SpO2 Resp BP Pain Score FACES Pain Rating User   12/26/24 1700 -- 90 100 % 16 132/58 -- -- RR   12/26/24 1645 -- 93 100 % 18 135/62 -- -- RR   12/26/24 1615 -- 115 99 % 16 133/62 -- -- RR   12/26/24 1453 -- -- -- -- -- No Pain -- RR   12/26/24 1449 97.7 °F (36.5 °C) 96 98 % 16 134/74 No Pain -- RR "            Physical Exam  Vitals and nursing note reviewed.   Constitutional:       Appearance: He is well-developed.   HENT:      Head: Normocephalic and atraumatic.   Eyes:      Pupils: Pupils are equal, round, and reactive to light.   Cardiovascular:      Rate and Rhythm: Normal rate and regular rhythm.   Pulmonary:      Effort: Pulmonary effort is normal.      Breath sounds: Normal breath sounds.   Abdominal:      Palpations: Abdomen is soft.   Musculoskeletal:      Cervical back: Normal range of motion.   Skin:     General: Skin is warm and dry.      Capillary Refill: Capillary refill takes less than 2 seconds.   Neurological:      General: No focal deficit present.      Mental Status: He is alert and oriented to person, place, and time.   Psychiatric:         Mood and Affect: Affect is angry.         Behavior: Behavior normal.         Results Reviewed       Procedure Component Value Units Date/Time    Comprehensive metabolic panel [[id number]]  (Abnormal) Collected: 12/26/24 1751    Lab Status: Final result Specimen: Blood from Arm, Right Updated: 12/26/24 1815     Sodium 136 mmol/L      Potassium 5.0 mmol/L      Chloride 106 mmol/L      CO2 25 mmol/L      ANION GAP 5 mmol/L      BUN 15 mg/dL      Creatinine 0.78 mg/dL      Glucose 89 mg/dL      Calcium 8.7 mg/dL      AST 34 U/L      ALT 78 U/L      Alkaline Phosphatase 59 U/L      Total Protein 6.3 g/dL      Albumin 4.0 g/dL      Total Bilirubin 0.42 mg/dL      eGFR 128 ml/min/1.73sq m     Narrative:      National Kidney Disease Foundation guidelines for Chronic Kidney Disease (CKD):     Stage 1 with normal or high GFR (GFR > 90 mL/min/1.73 square meters)    Stage 2 Mild CKD (GFR = 60-89 mL/min/1.73 square meters)    Stage 3A Moderate CKD (GFR = 45-59 mL/min/1.73 square meters)    Stage 3B Moderate CKD (GFR = 30-44 mL/min/1.73 square meters)    Stage 4 Severe CKD (GFR = 15-29 mL/min/1.73 square meters)    Stage 5 End Stage CKD (GFR <15 mL/min/1.73 square  meters)  Note: GFR calculation is accurate only with a steady state creatinine    Comprehensive metabolic panel [[id number]]  (Abnormal) Collected: 12/26/24 1606    Lab Status: Final result Specimen: Blood from Arm, Right Updated: 12/26/24 1657     Sodium 146 mmol/L      Potassium 5.9 mmol/L      Chloride 110 mmol/L      CO2 22 mmol/L      ANION GAP 14 mmol/L      BUN 16 mg/dL      Creatinine 0.83 mg/dL      Glucose 86 mg/dL      Calcium 10.1 mg/dL      AST 41 U/L      ALT 92 U/L      Alkaline Phosphatase 68 U/L      Total Protein 7.3 g/dL      Albumin 4.6 g/dL      Total Bilirubin 0.51 mg/dL      eGFR 124 ml/min/1.73sq m     Narrative:      test repeated and verified  National Kidney Disease Foundation guidelines for Chronic Kidney Disease (CKD):     Stage 1 with normal or high GFR (GFR > 90 mL/min/1.73 square meters)    Stage 2 Mild CKD (GFR = 60-89 mL/min/1.73 square meters)    Stage 3A Moderate CKD (GFR = 45-59 mL/min/1.73 square meters)    Stage 3B Moderate CKD (GFR = 30-44 mL/min/1.73 square meters)    Stage 4 Severe CKD (GFR = 15-29 mL/min/1.73 square meters)    Stage 5 End Stage CKD (GFR <15 mL/min/1.73 square meters)  Note: GFR calculation is accurate only with a steady state creatinine    CBC and differential [[id number]]  (Abnormal) Collected: 12/26/24 1606    Lab Status: Final result Specimen: Blood from Arm, Right Updated: 12/26/24 1610     WBC 11.23 Thousand/uL      RBC 4.33 Million/uL      Hemoglobin 13.2 g/dL      Hematocrit 41.4 %      MCV 96 fL      MCH 30.5 pg      MCHC 31.9 g/dL      RDW 13.0 %      MPV 10.7 fL      Platelets 236 Thousands/uL      nRBC 0 /100 WBCs      Segmented % 57 %      Immature Grans % 0 %      Lymphocytes % 26 %      Monocytes % 14 %      Eosinophils Relative 2 %      Basophils Relative 1 %      Absolute Neutrophils 6.46 Thousands/µL      Absolute Immature Grans 0.04 Thousand/uL      Absolute Lymphocytes 2.86 Thousands/µL      Absolute Monocytes 1.53 Thousand/µL       Eosinophils Absolute 0.27 Thousand/µL      Basophils Absolute 0.07 Thousands/µL     Levetiracetam level [[id number]] Collected: 12/26/24 1606    Lab Status: In process Specimen: Blood from Arm, Right Updated: 12/26/24 1608            No orders to display       Procedures    ED Medication and Procedure Management   Prior to Admission Medications   Prescriptions Last Dose Informant Patient Reported? Taking?   levETIRAcetam (KEPPRA) 500 mg tablet   No No   Sig: Take 3 tablets (1,500 mg total) by mouth every 12 (twelve) hours   zonisamide (ZONEGRAN) 100 mg capsule   No No   Sig: Take 3 capsules (300 mg total) by mouth daily      Facility-Administered Medications: None     Discharge Medication List as of 12/26/2024  5:49 PM        CONTINUE these medications which have NOT CHANGED    Details   levETIRAcetam (KEPPRA) 500 mg tablet Take 3 tablets (1,500 mg total) by mouth every 12 (twelve) hours, Starting Mon 4/29/2024, No Print      zonisamide (ZONEGRAN) 100 mg capsule Take 3 capsules (300 mg total) by mouth daily, Starting Wed 2/14/2024, Until Thu 10/17/2024, Normal           No discharge procedures on file.  ED SEPSIS DOCUMENTATION   Time reflects when diagnosis was documented in both MDM as applicable and the Disposition within this note       Time User Action Codes Description Comment    12/26/2024  5:49 PM Armin Dean Add [G40.919] Breakthrough seizure (HCC)                  Armin Dean PA-C  12/26/24 2014

## 2025-03-10 ENCOUNTER — HOSPITAL ENCOUNTER (EMERGENCY)
Facility: HOSPITAL | Age: 25
Discharge: HOME | End: 2025-03-10
Attending: EMERGENCY MEDICINE
Payer: COMMERCIAL

## 2025-03-10 VITALS
OXYGEN SATURATION: 98 % | DIASTOLIC BLOOD PRESSURE: 86 MMHG | HEIGHT: 72 IN | TEMPERATURE: 97.4 F | BODY MASS INDEX: 26.07 KG/M2 | RESPIRATION RATE: 14 BRPM | WEIGHT: 192.46 LBS | SYSTOLIC BLOOD PRESSURE: 133 MMHG | HEART RATE: 57 BPM

## 2025-03-10 DIAGNOSIS — G40.919 BREAKTHROUGH SEIZURE (HCC): ICD-10-CM

## 2025-03-10 DIAGNOSIS — R56.9 SEIZURE (HCC): Primary | ICD-10-CM

## 2025-03-10 LAB
ALBUMIN SERPL BCG-MCNC: 4.9 G/DL (ref 3.5–5)
ALP SERPL-CCNC: 62 U/L (ref 34–104)
ALT SERPL W P-5'-P-CCNC: 20 U/L (ref 7–52)
ANION GAP SERPL CALCULATED.3IONS-SCNC: 7 MMOL/L (ref 4–13)
AST SERPL W P-5'-P-CCNC: 21 U/L (ref 13–39)
ATRIAL RATE: 79 BPM
BASOPHILS # BLD AUTO: 0.07 THOUSANDS/ÂΜL (ref 0–0.1)
BASOPHILS NFR BLD AUTO: 1 % (ref 0–1)
BILIRUB SERPL-MCNC: 0.54 MG/DL (ref 0.2–1)
BUN SERPL-MCNC: 11 MG/DL (ref 5–25)
CALCIUM SERPL-MCNC: 9.9 MG/DL (ref 8.4–10.2)
CHLORIDE SERPL-SCNC: 103 MMOL/L (ref 96–108)
CO2 SERPL-SCNC: 28 MMOL/L (ref 21–32)
CREAT SERPL-MCNC: 0.87 MG/DL (ref 0.6–1.3)
EOSINOPHIL # BLD AUTO: 0.29 THOUSAND/ÂΜL (ref 0–0.61)
EOSINOPHIL NFR BLD AUTO: 3 % (ref 0–6)
ERYTHROCYTE [DISTWIDTH] IN BLOOD BY AUTOMATED COUNT: 11.8 % (ref 11.6–15.1)
GFR SERPL CREATININE-BSD FRML MDRD: 122 ML/MIN/1.73SQ M
GLUCOSE SERPL-MCNC: 103 MG/DL (ref 65–140)
GLUCOSE SERPL-MCNC: 82 MG/DL (ref 65–140)
HCT VFR BLD AUTO: 48.7 % (ref 36.5–49.3)
HGB BLD-MCNC: 15.9 G/DL (ref 12–17)
IMM GRANULOCYTES # BLD AUTO: 0.04 THOUSAND/UL (ref 0–0.2)
IMM GRANULOCYTES NFR BLD AUTO: 0 % (ref 0–2)
LEVETIRACETAM SERPL-MCNC: <2 UG/ML (ref 12–46)
LYMPHOCYTES # BLD AUTO: 2.75 THOUSANDS/ÂΜL (ref 0.6–4.47)
LYMPHOCYTES NFR BLD AUTO: 24 % (ref 14–44)
MCH RBC QN AUTO: 30.1 PG (ref 26.8–34.3)
MCHC RBC AUTO-ENTMCNC: 32.6 G/DL (ref 31.4–37.4)
MCV RBC AUTO: 92 FL (ref 82–98)
MONOCYTES # BLD AUTO: 1.29 THOUSAND/ÂΜL (ref 0.17–1.22)
MONOCYTES NFR BLD AUTO: 11 % (ref 4–12)
NEUTROPHILS # BLD AUTO: 7.09 THOUSANDS/ÂΜL (ref 1.85–7.62)
NEUTS SEG NFR BLD AUTO: 61 % (ref 43–75)
NRBC BLD AUTO-RTO: 0 /100 WBCS
P AXIS: 65 DEGREES
PLATELET # BLD AUTO: 292 THOUSANDS/UL (ref 149–390)
PMV BLD AUTO: 10.3 FL (ref 8.9–12.7)
POTASSIUM SERPL-SCNC: 3.9 MMOL/L (ref 3.5–5.3)
PR INTERVAL: 162 MS
PROT SERPL-MCNC: 7.3 G/DL (ref 6.4–8.4)
QRS AXIS: 86 DEGREES
QRSD INTERVAL: 84 MS
QT INTERVAL: 348 MS
QTC INTERVAL: 399 MS
RBC # BLD AUTO: 5.29 MILLION/UL (ref 3.88–5.62)
SODIUM SERPL-SCNC: 138 MMOL/L (ref 135–147)
T WAVE AXIS: 69 DEGREES
VENTRICULAR RATE: 79 BPM
WBC # BLD AUTO: 11.53 THOUSAND/UL (ref 4.31–10.16)

## 2025-03-10 PROCEDURE — 85025 COMPLETE CBC W/AUTO DIFF WBC: CPT | Performed by: EMERGENCY MEDICINE

## 2025-03-10 PROCEDURE — 93005 ELECTROCARDIOGRAM TRACING: CPT

## 2025-03-10 PROCEDURE — 93010 ELECTROCARDIOGRAM REPORT: CPT | Performed by: INTERNAL MEDICINE

## 2025-03-10 PROCEDURE — 83520 IMMUNOASSAY QUANT NOS NONAB: CPT | Performed by: EMERGENCY MEDICINE

## 2025-03-10 PROCEDURE — 99285 EMERGENCY DEPT VISIT HI MDM: CPT | Performed by: EMERGENCY MEDICINE

## 2025-03-10 PROCEDURE — 96375 TX/PRO/DX INJ NEW DRUG ADDON: CPT

## 2025-03-10 PROCEDURE — 36415 COLL VENOUS BLD VENIPUNCTURE: CPT | Performed by: EMERGENCY MEDICINE

## 2025-03-10 PROCEDURE — 99284 EMERGENCY DEPT VISIT MOD MDM: CPT

## 2025-03-10 PROCEDURE — 80053 COMPREHEN METABOLIC PANEL: CPT | Performed by: EMERGENCY MEDICINE

## 2025-03-10 PROCEDURE — 96374 THER/PROPH/DIAG INJ IV PUSH: CPT

## 2025-03-10 PROCEDURE — 82948 REAGENT STRIP/BLOOD GLUCOSE: CPT

## 2025-03-10 RX ORDER — LEVETIRACETAM 500 MG/5ML
1500 INJECTION, SOLUTION, CONCENTRATE INTRAVENOUS ONCE
Status: COMPLETED | OUTPATIENT
Start: 2025-03-10 | End: 2025-03-10

## 2025-03-10 RX ORDER — LORAZEPAM 2 MG/ML
1 INJECTION INTRAMUSCULAR ONCE
Status: COMPLETED | OUTPATIENT
Start: 2025-03-10 | End: 2025-03-10

## 2025-03-10 RX ADMIN — LORAZEPAM 1 MG: 2 INJECTION INTRAMUSCULAR; INTRAVENOUS at 05:20

## 2025-03-10 RX ADMIN — LEVETIRACETAM 1500 MG: 100 INJECTION, SOLUTION, CONCENTRATE INTRAVENOUS at 05:22

## 2025-03-10 NOTE — DISCHARGE INSTRUCTIONS
You were seen in the emergency department for seizures, we have ordered a Keppra level, and provided you with Ativan, and Keppra, we have also provided you with an ambulatory referral to neurology, please call their office and schedule an appointment for medication management as you may require a higher dose of Keppra.  If your symptoms worsen or persist, please return to the emergency department immediately.

## 2025-03-10 NOTE — ED PROVIDER NOTES
Time reflects when diagnosis was documented in both MDM as applicable and the Disposition within this note       Time User Action Codes Description Comment    3/10/2025  5:20 AM Daren Pollack [R56.9] Seizure (HCC)     3/10/2025  5:20 AM Daren Pollack [G40.919] Breakthrough seizure (HCC)           ED Disposition       ED Disposition   Discharge    Condition   Stable    Date/Time   Mon Mar 10, 2025  5:56 AM    Comment   Tim Alanis Jr. discharge to home/self care.                   Assessment & Plan       Medical Decision Making  24-year-old male presents to the emergency department with complaint of seizure, differential diagnosis include breakthrough seizure, subtherapeutic antielliptic medication, metabolic causes, infection, electrolyte abnormality, will perform CBC, CMP, EKG, check Keppra level, load patient with Keppra, and provide dose of Ativan.  If workup unremarkable, and patient remained seizure-free, will provide him with neurology follow-up for further evaluation, and medication management, as his symptoms are becoming more frequent over the past 2 weeks.  Patient is currently on 1500 mg / 3 tablets of Keppra by mouth every 12 hours, and zonisamide 300 mg daily.    Discussed results with patient and family, they will follow-up with neurology outpatient.  Strict turn precautions given.  Patient understands and agrees with treatment plan.    Amount and/or Complexity of Data Reviewed  Labs: ordered.    Risk  Prescription drug management.             Medications   LORazepam (ATIVAN) injection 1 mg (1 mg Intravenous Given 3/10/25 0520)   levETIRAcetam (KEPPRA) injection 1,500 mg (1,500 mg Intravenous Given 3/10/25 0522)       ED Risk Strat Scores                            SBIRT 22yo+      Flowsheet Row Most Recent Value   Initial Alcohol Screen: US AUDIT-C     1. How often do you have a drink containing alcohol? 0 Filed at: 03/10/2025 0459   2. How many drinks containing alcohol do you have on a  typical day you are drinking?  0 Filed at: 03/10/2025 0459   3a. Male UNDER 65: How often do you have five or more drinks on one occasion? 0 Filed at: 03/10/2025 0459   3b. FEMALE Any Age, or MALE 65+: How often do you have 4 or more drinks on one occassion? 0 Filed at: 03/10/2025 0459   Audit-C Score 0 Filed at: 03/10/2025 0459   DANIELE: How many times in the past year have you...    Used an illegal drug or used a prescription medication for non-medical reasons? Never Filed at: 03/10/2025 0459                            History of Present Illness       Chief Complaint   Patient presents with    Seizure - Prior Hx Of     Pt forgot to take meds for this. Last seizure was on the way here approx 1 min,.        Past Medical History:   Diagnosis Date    Seizure (HCC)       No past surgical history on file.   No family history on file.   Social History     Tobacco Use    Smoking status: Every Day     Current packs/day: 1.00     Average packs/day: 1 pack/day for 5.0 years (5.0 ttl pk-yrs)     Types: Cigarettes    Smokeless tobacco: Never   Vaping Use    Vaping status: Never Used   Substance Use Topics    Alcohol use: Yes    Drug use: Yes     Types: Marijuana, Methamphetamines     Comment: per mother pt has history of meth amphetamine abuse      E-Cigarette/Vaping    E-Cigarette Use Never User       E-Cigarette/Vaping Substances    Nicotine No     THC No     CBD No     Flavoring No     Other No     Unknown No       I have reviewed and agree with the history as documented.     24-year-old male presents to the emergency department with chief complaint of breakthrough seizure.  Patient states that he was sleeping last night, had a seizure, and mother brought him into the hospital for evaluation.  Patient reports that he did not take his dose of Keppra last night.  Mother also states that he had an episode where he seized in the car.  This lasted just a moment, and patient returned to baseline.  Mother states that patient  normally has breakthrough seizures once a month.  He is now having them multiple times over the past 2 weeks.  Patient denies any vision changes, focal weakness, changes in sensation.  He denies any chills, fevers, coughing, mucus, chest pain, shortness of breath, GI or  complaints.          Seizure - Prior Hx Of      Review of Systems   Constitutional:  Negative for chills and fever.   HENT:  Negative for ear pain and sore throat.    Eyes:  Negative for pain and visual disturbance.   Respiratory:  Negative for cough and shortness of breath.    Cardiovascular:  Negative for chest pain and palpitations.   Gastrointestinal:  Negative for abdominal pain and vomiting.   Genitourinary:  Negative for dysuria and hematuria.   Musculoskeletal:  Negative for arthralgias and back pain.   Skin:  Negative for color change and rash.   Neurological:  Positive for seizures. Negative for syncope.   All other systems reviewed and are negative.          Objective       ED Triage Vitals   Temperature Pulse Blood Pressure Respirations SpO2 Patient Position - Orthostatic VS   03/10/25 0456 03/10/25 0456 03/10/25 0456 03/10/25 0456 03/10/25 0456 03/10/25 0530   (!) 97.4 °F (36.3 °C) 88 143/87 18 98 % Lying      Temp Source Heart Rate Source BP Location FiO2 (%) Pain Score    03/10/25 0456 03/10/25 0530 03/10/25 0530 -- --    Temporal Monitor Left arm        Vitals      Date and Time Temp Pulse SpO2 Resp BP Pain Score FACES Pain Rating User   03/10/25 0530 -- 87 98 % 16 134/82 -- -- TH   03/10/25 0456 97.4 °F (36.3 °C) 88 98 % 18 143/87 -- --             Physical Exam  Vitals and nursing note reviewed.   Constitutional:       General: He is not in acute distress.     Appearance: He is well-developed.   HENT:      Head: Normocephalic and atraumatic.   Eyes:      Conjunctiva/sclera: Conjunctivae normal.   Cardiovascular:      Rate and Rhythm: Normal rate and regular rhythm.      Heart sounds: No murmur heard.  Pulmonary:      Effort:  Pulmonary effort is normal. No respiratory distress.      Breath sounds: Normal breath sounds.   Abdominal:      Palpations: Abdomen is soft.      Tenderness: There is no abdominal tenderness.   Musculoskeletal:         General: No swelling.      Cervical back: Neck supple.   Skin:     General: Skin is warm and dry.      Capillary Refill: Capillary refill takes less than 2 seconds.   Neurological:      General: No focal deficit present.      Mental Status: He is alert and oriented to person, place, and time. Mental status is at baseline.      Cranial Nerves: No cranial nerve deficit.      Sensory: No sensory deficit.      Motor: No weakness.   Psychiatric:         Mood and Affect: Mood normal.         Results Reviewed       Procedure Component Value Units Date/Time    Comprehensive metabolic panel [[id number]] Collected: 03/10/25 0516    Lab Status: Final result Specimen: Blood from Arm, Right Updated: 03/10/25 0546     Sodium 138 mmol/L      Potassium 3.9 mmol/L      Chloride 103 mmol/L      CO2 28 mmol/L      ANION GAP 7 mmol/L      BUN 11 mg/dL      Creatinine 0.87 mg/dL      Glucose 103 mg/dL      Calcium 9.9 mg/dL      AST 21 U/L      ALT 20 U/L      Alkaline Phosphatase 62 U/L      Total Protein 7.3 g/dL      Albumin 4.9 g/dL      Total Bilirubin 0.54 mg/dL      eGFR 122 ml/min/1.73sq m     Narrative:      National Kidney Disease Foundation guidelines for Chronic Kidney Disease (CKD):     Stage 1 with normal or high GFR (GFR > 90 mL/min/1.73 square meters)    Stage 2 Mild CKD (GFR = 60-89 mL/min/1.73 square meters)    Stage 3A Moderate CKD (GFR = 45-59 mL/min/1.73 square meters)    Stage 3B Moderate CKD (GFR = 30-44 mL/min/1.73 square meters)    Stage 4 Severe CKD (GFR = 15-29 mL/min/1.73 square meters)    Stage 5 End Stage CKD (GFR <15 mL/min/1.73 square meters)  Note: GFR calculation is accurate only with a steady state creatinine    Fingerstick Glucose (POCT) [[id number]]  (Normal) Collected: 03/10/25 0556     Lab Status: Final result Specimen: Blood Updated: 03/10/25 0533     POC Glucose 82 mg/dl     CBC and differential [[id number]]  (Abnormal) Collected: 03/10/25 0516    Lab Status: Final result Specimen: Blood from Arm, Right Updated: 03/10/25 0527     WBC 11.53 Thousand/uL      RBC 5.29 Million/uL      Hemoglobin 15.9 g/dL      Hematocrit 48.7 %      MCV 92 fL      MCH 30.1 pg      MCHC 32.6 g/dL      RDW 11.8 %      MPV 10.3 fL      Platelets 292 Thousands/uL      nRBC 0 /100 WBCs      Segmented % 61 %      Immature Grans % 0 %      Lymphocytes % 24 %      Monocytes % 11 %      Eosinophils Relative 3 %      Basophils Relative 1 %      Absolute Neutrophils 7.09 Thousands/µL      Absolute Immature Grans 0.04 Thousand/uL      Absolute Lymphocytes 2.75 Thousands/µL      Absolute Monocytes 1.29 Thousand/µL      Eosinophils Absolute 0.29 Thousand/µL      Basophils Absolute 0.07 Thousands/µL     Levetiracetam level [[id number]] Collected: 03/10/25 0516    Lab Status: In process Specimen: Blood from Arm, Right Updated: 03/10/25 0525            No orders to display       Procedures    ED Medication and Procedure Management   Prior to Admission Medications   Prescriptions Last Dose Informant Patient Reported? Taking?   levETIRAcetam (KEPPRA) 500 mg tablet   No No   Sig: Take 3 tablets (1,500 mg total) by mouth every 12 (twelve) hours   zonisamide (ZONEGRAN) 100 mg capsule   No No   Sig: Take 3 capsules (300 mg total) by mouth daily      Facility-Administered Medications: None     Patient's Medications   Discharge Prescriptions    No medications on file       ED SEPSIS DOCUMENTATION   Time reflects when diagnosis was documented in both MDM as applicable and the Disposition within this note       Time User Action Codes Description Comment    3/10/2025  5:20 AM Daren Pollack [R56.9] Seizure (HCC)     3/10/2025  5:20 AM Daren Pollack [G40.919] Breakthrough seizure (HCC)                  Daren Pollack,  DO  03/10/25 0558

## 2025-07-21 ENCOUNTER — NURSE TRIAGE (OUTPATIENT)
Dept: OTHER | Facility: OTHER | Age: 25
End: 2025-07-21

## 2025-07-21 ENCOUNTER — TELEPHONE (OUTPATIENT)
Dept: OTHER | Facility: OTHER | Age: 25
End: 2025-07-21

## 2025-07-21 NOTE — TELEPHONE ENCOUNTER
"Patient given neurologist and PCP phone numbers. Pt told to follow up with his neurologist for refill today.    Reason for Disposition   Caller with prescription and triager answers question    Answer Assessment - Initial Assessment Questions  1. DRUG NAME: \"What medicine do you need to have refilled?\"      Zonegran and keppra      4. PRESCRIBING HCP: \"Who prescribed it?\" Reason: If prescribed by specialist, call should be referred to that group.      Pts [facility] neurologist    Protocols used: Medication Refill and Renewal Call-Adult-    "

## 2025-07-21 NOTE — TELEPHONE ENCOUNTER
Pt called in to check on status of which pharmacy his emergency refill on Keppra, and [facility] informed pt triage has been closed and meds will be sent to [facility] in [place].     No callback needed at this time

## 2025-07-21 NOTE — TELEPHONE ENCOUNTER
"Regarding: Urgent Med Refill/ Pharmacy closes at 9 pm  ----- Message from Reema MCRAE sent at 7/21/2025  6:28 PM EDT -----  Patient stated, \"I am totally out of my medications. Dr. Scott usually refills my seizure medications.\"  -----------------------------------------------------------------------------------------------------------------------------------    Medication: levETIRAcetam (KEPPRA) 500 mg tablet     Dose/Frequency: Take 3 tablets (1,500 mg total) by mouth every 12 (twelve) hours    Quantity: Unknown/ patient stated \"60\"    Pharmacy: [facility]- Address: [address] [place] [address]  Phone: [phone]    Office:   [ ] PCP/Provider -   [ x] Speciality/Provider -     Does the patient have enough for 3 days?   [ ] Yes   [ x] No - Send as HP to POD        Medication: zonisamide (ZONEGRAN) 100 mg capsule      Dose/Frequency: Take 3 capsules (300 mg total) by mouth daily    Quantity: 90 capsule (30 day supply)    Pharmacy: [facility]- Address: [address] [place] [address]  Phone: [phone]    Office:   [ ] PCP/Provider -   [x ] Speciality/Provider -     Does the patient have enough for 3 days?   [ ] Yes   [x ] No - Send as HP to POD    "

## 2025-07-22 NOTE — TELEPHONE ENCOUNTER
Pt called back, he is at [facility] and his rx aren't there. I reached out to LOPEZ Alvarez to verify previous request. Pt see's [facility]ODY therefore we are not able to refill the medications, pt was provided # for them of [phone] . Pt stated he tried calling and didn't get anyone, I warm transferred pt after getting a rep on the line and explaining situation.

## 2025-08-19 ENCOUNTER — APPOINTMENT (EMERGENCY)
Dept: CT IMAGING | Facility: HOSPITAL | Age: 25
End: 2025-08-19
Payer: COMMERCIAL

## 2025-08-19 ENCOUNTER — HOSPITAL ENCOUNTER (EMERGENCY)
Facility: HOSPITAL | Age: 25
Discharge: HOME | End: 2025-08-19
Admitting: EMERGENCY MEDICINE
Payer: COMMERCIAL

## 2025-08-19 VITALS
DIASTOLIC BLOOD PRESSURE: 80 MMHG | SYSTOLIC BLOOD PRESSURE: 121 MMHG | OXYGEN SATURATION: 96 % | RESPIRATION RATE: 20 BRPM | HEART RATE: 90 BPM | TEMPERATURE: 97.9 F

## 2025-08-19 DIAGNOSIS — G40.919 BREAKTHROUGH SEIZURE (HCC): Primary | ICD-10-CM

## 2025-08-19 DIAGNOSIS — Z91.199 HISTORY OF NONCOMPLIANCE WITH MEDICAL TREATMENT: ICD-10-CM

## 2025-08-19 LAB
ALBUMIN SERPL BCG-MCNC: 4.8 G/DL (ref 3.5–5)
ALP SERPL-CCNC: 60 U/L (ref 34–104)
ALT SERPL W P-5'-P-CCNC: 12 U/L (ref 7–52)
ANION GAP SERPL CALCULATED.3IONS-SCNC: 10 MMOL/L (ref 4–13)
ANION GAP SERPL CALCULATED.3IONS-SCNC: 25 MMOL/L (ref 4–13)
ANION GAP SERPL CALCULATED.3IONS-SCNC: 8 MMOL/L (ref 4–13)
AST SERPL W P-5'-P-CCNC: 17 U/L (ref 13–39)
BASE EX.OXY STD BLDV CALC-SCNC: 74.3 % (ref 60–80)
BASE EXCESS BLDV CALC-SCNC: -6.7 MMOL/L
BASOPHILS # BLD MANUAL: 0.17 THOUSAND/UL (ref 0–0.1)
BASOPHILS NFR MAR MANUAL: 1 % (ref 0–1)
BILIRUB SERPL-MCNC: 0.31 MG/DL (ref 0.2–1)
BUN SERPL-MCNC: 8 MG/DL (ref 5–25)
BUN SERPL-MCNC: 8 MG/DL (ref 5–25)
BUN SERPL-MCNC: 9 MG/DL (ref 5–25)
CALCIUM SERPL-MCNC: 8.4 MG/DL (ref 8.4–10.2)
CALCIUM SERPL-MCNC: 8.7 MG/DL (ref 8.4–10.2)
CALCIUM SERPL-MCNC: 9 MG/DL (ref 8.4–10.2)
CHLORIDE SERPL-SCNC: 101 MMOL/L (ref 96–108)
CHLORIDE SERPL-SCNC: 105 MMOL/L (ref 96–108)
CHLORIDE SERPL-SCNC: 105 MMOL/L (ref 96–108)
CK SERPL-CCNC: 117 U/L (ref 39–308)
CO2 SERPL-SCNC: 18 MMOL/L (ref 21–32)
CO2 SERPL-SCNC: 21 MMOL/L (ref 21–32)
CO2 SERPL-SCNC: 9 MMOL/L (ref 21–32)
CREAT SERPL-MCNC: 0.8 MG/DL (ref 0.6–1.3)
CREAT SERPL-MCNC: 0.86 MG/DL (ref 0.6–1.3)
CREAT SERPL-MCNC: 1.1 MG/DL (ref 0.6–1.3)
EOSINOPHIL # BLD MANUAL: 0.33 THOUSAND/UL (ref 0–0.4)
EOSINOPHIL NFR BLD MANUAL: 2 % (ref 0–6)
ERYTHROCYTE [DISTWIDTH] IN BLOOD BY AUTOMATED COUNT: 12.1 % (ref 11.6–15.1)
ETHANOL SERPL-MCNC: <10 MG/DL
GFR SERPL CREATININE-BSD FRML MDRD: 122 ML/MIN/1.73SQ M
GFR SERPL CREATININE-BSD FRML MDRD: 125 ML/MIN/1.73SQ M
GFR SERPL CREATININE-BSD FRML MDRD: 94 ML/MIN/1.73SQ M
GLUCOSE SERPL-MCNC: 166 MG/DL (ref 65–140)
GLUCOSE SERPL-MCNC: 92 MG/DL (ref 65–140)
GLUCOSE SERPL-MCNC: 98 MG/DL (ref 65–140)
HCO3 BLDV-SCNC: 18.8 MMOL/L (ref 24–30)
HCT VFR BLD AUTO: 48.6 % (ref 36.5–49.3)
HGB BLD-MCNC: 15.2 G/DL (ref 12–17)
LACTATE SERPL-SCNC: 1.2 MMOL/L (ref 0.5–2)
LACTATE SERPL-SCNC: 2.9 MMOL/L (ref 0.5–2)
LEVETIRACETAM SERPL-MCNC: <2 UG/ML (ref 12–46)
LYMPHOCYTES # BLD AUTO: 0.83 THOUSAND/UL (ref 0.6–4.47)
LYMPHOCYTES # BLD AUTO: 5 % (ref 14–44)
MCH RBC QN AUTO: 30.4 PG (ref 26.8–34.3)
MCHC RBC AUTO-ENTMCNC: 31.3 G/DL (ref 31.4–37.4)
MCV RBC AUTO: 97 FL (ref 82–98)
MONOCYTES # BLD AUTO: 1.82 THOUSAND/UL (ref 0–1.22)
MONOCYTES NFR BLD: 11 % (ref 4–12)
NEUTROPHILS # BLD MANUAL: 13.37 THOUSAND/UL (ref 1.85–7.62)
NEUTS BAND NFR BLD MANUAL: 1 % (ref 0–8)
NEUTS SEG NFR BLD AUTO: 80 % (ref 43–75)
O2 CT BLDV-SCNC: 16.6 ML/DL
PCO2 BLDV: 37.9 MM HG (ref 42–50)
PH BLDV: 7.31 [PH] (ref 7.3–7.4)
PLATELET # BLD AUTO: 235 THOUSANDS/UL (ref 149–390)
PLATELET BLD QL SMEAR: ADEQUATE
PMV BLD AUTO: 11.6 FL (ref 8.9–12.7)
PO2 BLDV: 42.9 MM HG (ref 35–45)
POTASSIUM SERPL-SCNC: 4 MMOL/L (ref 3.5–5.3)
POTASSIUM SERPL-SCNC: 4.6 MMOL/L (ref 3.5–5.3)
POTASSIUM SERPL-SCNC: 5.3 MMOL/L (ref 3.5–5.3)
PROT SERPL-MCNC: 7.4 G/DL (ref 6.4–8.4)
RBC # BLD AUTO: 5 MILLION/UL (ref 3.88–5.62)
RBC MORPH BLD: NORMAL
SODIUM SERPL-SCNC: 133 MMOL/L (ref 135–147)
SODIUM SERPL-SCNC: 134 MMOL/L (ref 135–147)
SODIUM SERPL-SCNC: 135 MMOL/L (ref 135–147)
WBC # BLD AUTO: 16.51 THOUSAND/UL (ref 4.31–10.16)

## 2025-08-19 PROCEDURE — 36415 COLL VENOUS BLD VENIPUNCTURE: CPT | Performed by: EMERGENCY MEDICINE

## 2025-08-19 PROCEDURE — 83605 ASSAY OF LACTIC ACID: CPT | Performed by: EMERGENCY MEDICINE

## 2025-08-19 PROCEDURE — 99285 EMERGENCY DEPT VISIT HI MDM: CPT | Performed by: EMERGENCY MEDICINE

## 2025-08-19 PROCEDURE — 83520 IMMUNOASSAY QUANT NOS NONAB: CPT | Performed by: EMERGENCY MEDICINE

## 2025-08-19 PROCEDURE — 85027 COMPLETE CBC AUTOMATED: CPT | Performed by: EMERGENCY MEDICINE

## 2025-08-19 PROCEDURE — 80048 BASIC METABOLIC PNL TOTAL CA: CPT | Performed by: EMERGENCY MEDICINE

## 2025-08-19 PROCEDURE — 96375 TX/PRO/DX INJ NEW DRUG ADDON: CPT

## 2025-08-19 PROCEDURE — 96365 THER/PROPH/DIAG IV INF INIT: CPT

## 2025-08-19 PROCEDURE — 80053 COMPREHEN METABOLIC PANEL: CPT | Performed by: EMERGENCY MEDICINE

## 2025-08-19 PROCEDURE — 82805 BLOOD GASES W/O2 SATURATION: CPT | Performed by: EMERGENCY MEDICINE

## 2025-08-19 PROCEDURE — 82550 ASSAY OF CK (CPK): CPT | Performed by: EMERGENCY MEDICINE

## 2025-08-19 PROCEDURE — 70450 CT HEAD/BRAIN W/O DYE: CPT

## 2025-08-19 PROCEDURE — 85007 BL SMEAR W/DIFF WBC COUNT: CPT | Performed by: EMERGENCY MEDICINE

## 2025-08-19 PROCEDURE — 99284 EMERGENCY DEPT VISIT MOD MDM: CPT

## 2025-08-19 PROCEDURE — 96366 THER/PROPH/DIAG IV INF ADDON: CPT

## 2025-08-19 PROCEDURE — 82077 ASSAY SPEC XCP UR&BREATH IA: CPT | Performed by: EMERGENCY MEDICINE

## 2025-08-19 RX ORDER — LEVETIRACETAM 500 MG/5ML
1500 INJECTION, SOLUTION, CONCENTRATE INTRAVENOUS ONCE
Status: COMPLETED | OUTPATIENT
Start: 2025-08-19 | End: 2025-08-19

## 2025-08-19 RX ADMIN — SODIUM CHLORIDE, SODIUM LACTATE, POTASSIUM CHLORIDE, AND CALCIUM CHLORIDE 1000 ML: .6; .31; .03; .02 INJECTION, SOLUTION INTRAVENOUS at 12:44

## 2025-08-19 RX ADMIN — SODIUM CHLORIDE, SODIUM LACTATE, POTASSIUM CHLORIDE, AND CALCIUM CHLORIDE 1000 ML: .6; .31; .03; .02 INJECTION, SOLUTION INTRAVENOUS at 14:38

## 2025-08-19 RX ADMIN — LEVETIRACETAM 1500 MG: 100 INJECTION, SOLUTION, CONCENTRATE INTRAVENOUS at 12:28
